# Patient Record
Sex: FEMALE | Race: WHITE | NOT HISPANIC OR LATINO | Employment: OTHER | URBAN - NONMETROPOLITAN AREA
[De-identification: names, ages, dates, MRNs, and addresses within clinical notes are randomized per-mention and may not be internally consistent; named-entity substitution may affect disease eponyms.]

---

## 2017-01-09 ENCOUNTER — HOSPITAL ENCOUNTER (OUTPATIENT)
Dept: LAB | Facility: HOSPITAL | Age: 72
Discharge: HOME | End: 2017-01-09
Attending: FAMILY MEDICINE

## 2017-01-09 LAB
INR PPP: 2.8 (ref 0.9–1.1)
PROTHROMBIN TIME: 30.7 SEC (ref 9.3–12.1)

## 2017-02-15 ENCOUNTER — TRANSCRIBE ORDERS (OUTPATIENT)
Dept: ULTRASOUND IMAGING | Facility: HOSPITAL | Age: 72
End: 2017-02-15

## 2017-02-15 DIAGNOSIS — N18.30 CHRONIC KIDNEY DISEASE, STAGE III (MODERATE) (HCC): Primary | ICD-10-CM

## 2017-02-15 DIAGNOSIS — I10 ESSENTIAL HYPERTENSION, MALIGNANT: ICD-10-CM

## 2017-03-07 ENCOUNTER — HOSPITAL ENCOUNTER (OUTPATIENT)
Dept: ULTRASOUND IMAGING | Facility: HOSPITAL | Age: 72
Discharge: HOME | End: 2017-03-07

## 2017-03-07 ENCOUNTER — HOSPITAL ENCOUNTER (OUTPATIENT)
Dept: ULTRASOUND IMAGING | Facility: HOSPITAL | Age: 72
Discharge: HOME | End: 2017-03-07
Admitting: INTERNAL MEDICINE

## 2017-03-07 DIAGNOSIS — I10 ESSENTIAL HYPERTENSION, MALIGNANT: ICD-10-CM

## 2017-03-07 DIAGNOSIS — N18.30 CHRONIC KIDNEY DISEASE, STAGE III (MODERATE) (HCC): ICD-10-CM

## 2017-03-07 PROCEDURE — 76775 US EXAM ABDO BACK WALL LIM: CPT

## 2017-03-07 PROCEDURE — 93975 VASCULAR STUDY: CPT

## 2017-03-13 ENCOUNTER — TRANSCRIBE ORDERS (OUTPATIENT)
Dept: LAB | Facility: HOSPITAL | Age: 72
End: 2017-03-13

## 2017-03-13 ENCOUNTER — LAB (OUTPATIENT)
Dept: LAB | Facility: HOSPITAL | Age: 72
End: 2017-03-13

## 2017-03-13 DIAGNOSIS — E11.9 DIABETES MELLITUS WITHOUT COMPLICATION (HCC): ICD-10-CM

## 2017-03-13 DIAGNOSIS — E78.5 HYPERLIPIDEMIA, UNSPECIFIED HYPERLIPIDEMIA TYPE: ICD-10-CM

## 2017-03-13 DIAGNOSIS — D50.9 IRON DEFICIENCY ANEMIA, UNSPECIFIED: ICD-10-CM

## 2017-03-13 DIAGNOSIS — I48.91 ATRIAL FIBRILLATION, UNSPECIFIED TYPE (HCC): ICD-10-CM

## 2017-03-13 DIAGNOSIS — I48.91 ATRIAL FIBRILLATION, UNSPECIFIED TYPE (HCC): Primary | ICD-10-CM

## 2017-03-13 LAB
ALBUMIN SERPL-MCNC: 4.2 G/DL (ref 3.5–5)
ALBUMIN/GLOB SERPL: 1.5 G/DL (ref 1–2)
ALP SERPL-CCNC: 64 U/L (ref 38–126)
ALT SERPL W P-5'-P-CCNC: 19 U/L (ref 13–69)
ANION GAP SERPL CALCULATED.3IONS-SCNC: 12.3 MMOL/L
AST SERPL-CCNC: 21 U/L (ref 15–46)
BASOPHILS # BLD AUTO: 0.03 10*3/MM3 (ref 0–0.2)
BASOPHILS NFR BLD AUTO: 0.6 % (ref 0–2.5)
BILIRUB SERPL-MCNC: 0.8 MG/DL (ref 0.2–1.3)
BUN BLD-MCNC: 22 MG/DL (ref 7–20)
BUN/CREAT SERPL: 15.7 (ref 7.1–23.5)
CALCIUM SPEC-SCNC: 10.2 MG/DL (ref 8.4–10.2)
CHLORIDE SERPL-SCNC: 102 MMOL/L (ref 98–107)
CHOLEST SERPL-MCNC: 126 MG/DL (ref 0–199)
CO2 SERPL-SCNC: 29 MMOL/L (ref 26–30)
CREAT BLD-MCNC: 1.4 MG/DL (ref 0.6–1.3)
DEPRECATED RDW RBC AUTO: 48.9 FL (ref 37–54)
EOSINOPHIL # BLD AUTO: 0.05 10*3/MM3 (ref 0–0.7)
EOSINOPHIL NFR BLD AUTO: 1 % (ref 0–7)
ERYTHROCYTE [DISTWIDTH] IN BLOOD BY AUTOMATED COUNT: 14.5 % (ref 11.5–14.5)
GFR SERPL CREATININE-BSD FRML MDRD: 37 ML/MIN/1.73
GLOBULIN UR ELPH-MCNC: 2.8 GM/DL
GLUCOSE BLD-MCNC: 112 MG/DL (ref 74–98)
HBA1C MFR BLD: 6.4 % (ref 3–6)
HCT VFR BLD AUTO: 31.5 % (ref 37–47)
HDLC SERPL-MCNC: 69 MG/DL (ref 40–60)
HGB BLD-MCNC: 10.2 G/DL (ref 12–16)
IMM GRANULOCYTES # BLD: 0.03 10*3/MM3 (ref 0–0.06)
IMM GRANULOCYTES NFR BLD: 0.6 % (ref 0–0.6)
INR PPP: 3.11 (ref 0.9–1.1)
LDLC SERPL CALC-MCNC: 47 MG/DL (ref 0–99)
LDLC/HDLC SERPL: 0.68 {RATIO}
LYMPHOCYTES # BLD AUTO: 0.98 10*3/MM3 (ref 0.6–3.4)
LYMPHOCYTES NFR BLD AUTO: 19.7 % (ref 10–50)
MCH RBC QN AUTO: 30.1 PG (ref 27–31)
MCHC RBC AUTO-ENTMCNC: 32.4 G/DL (ref 30–37)
MCV RBC AUTO: 92.9 FL (ref 81–99)
MONOCYTES # BLD AUTO: 0.36 10*3/MM3 (ref 0–0.9)
MONOCYTES NFR BLD AUTO: 7.2 % (ref 0–12)
NEUTROPHILS # BLD AUTO: 3.53 10*3/MM3 (ref 2–6.9)
NEUTROPHILS NFR BLD AUTO: 70.9 % (ref 37–80)
NRBC BLD MANUAL-RTO: 0 /100 WBC (ref 0–0)
PLATELET # BLD AUTO: 198 10*3/MM3 (ref 130–400)
PMV BLD AUTO: 10.4 FL (ref 6–12)
POTASSIUM BLD-SCNC: 4.3 MMOL/L (ref 3.5–5.1)
PROT SERPL-MCNC: 7 G/DL (ref 6.3–8.2)
PROTHROMBIN TIME: 34.1 SECONDS (ref 9.3–12.1)
RBC # BLD AUTO: 3.39 10*6/MM3 (ref 4.2–5.4)
SODIUM BLD-SCNC: 139 MMOL/L (ref 137–145)
TRIGL SERPL-MCNC: 49 MG/DL
VLDLC SERPL-MCNC: 9.8 MG/DL
WBC NRBC COR # BLD: 4.98 10*3/MM3 (ref 4.8–10.8)

## 2017-03-13 PROCEDURE — 80053 COMPREHEN METABOLIC PANEL: CPT | Performed by: FAMILY MEDICINE

## 2017-03-13 PROCEDURE — 85025 COMPLETE CBC W/AUTO DIFF WBC: CPT | Performed by: FAMILY MEDICINE

## 2017-03-13 PROCEDURE — 85610 PROTHROMBIN TIME: CPT | Performed by: FAMILY MEDICINE

## 2017-03-13 PROCEDURE — 36415 COLL VENOUS BLD VENIPUNCTURE: CPT

## 2017-03-13 PROCEDURE — 80061 LIPID PANEL: CPT | Performed by: FAMILY MEDICINE

## 2017-03-13 PROCEDURE — 83036 HEMOGLOBIN GLYCOSYLATED A1C: CPT | Performed by: FAMILY MEDICINE

## 2017-03-14 PROBLEM — C50.919 BREAST CANCER (HCC): Status: ACTIVE | Noted: 2017-03-14

## 2017-03-14 PROBLEM — R91.8 PULMONARY NODULES: Status: ACTIVE | Noted: 2017-03-14

## 2017-03-14 PROBLEM — I48.91 ATRIAL FIBRILLATION (HCC): Status: ACTIVE | Noted: 2017-03-14

## 2017-04-21 ENCOUNTER — LAB (OUTPATIENT)
Dept: LAB | Facility: HOSPITAL | Age: 72
End: 2017-04-21

## 2017-04-21 ENCOUNTER — TRANSCRIBE ORDERS (OUTPATIENT)
Dept: ADMINISTRATIVE | Facility: HOSPITAL | Age: 72
End: 2017-04-21

## 2017-04-21 DIAGNOSIS — I48.91 ATRIAL FIBRILLATION, UNSPECIFIED TYPE (HCC): Primary | ICD-10-CM

## 2017-04-21 DIAGNOSIS — I48.91 ATRIAL FIBRILLATION, UNSPECIFIED TYPE (HCC): ICD-10-CM

## 2017-04-21 LAB
INR PPP: 2.07 (ref 0.9–1.1)
PROTHROMBIN TIME: 22.7 SECONDS (ref 9.3–12.1)

## 2017-04-21 PROCEDURE — 85610 PROTHROMBIN TIME: CPT

## 2017-04-21 PROCEDURE — 36415 COLL VENOUS BLD VENIPUNCTURE: CPT

## 2017-05-03 ENCOUNTER — LAB (OUTPATIENT)
Dept: LAB | Facility: HOSPITAL | Age: 72
End: 2017-05-03

## 2017-05-03 DIAGNOSIS — I48.91 ATRIAL FIBRILLATION, UNSPECIFIED TYPE (HCC): ICD-10-CM

## 2017-05-03 LAB
INR PPP: 2.97 (ref 0.9–1.1)
PROTHROMBIN TIME: 32.6 SECONDS (ref 9.3–12.1)

## 2017-05-03 PROCEDURE — 85610 PROTHROMBIN TIME: CPT | Performed by: FAMILY MEDICINE

## 2017-05-03 PROCEDURE — 36415 COLL VENOUS BLD VENIPUNCTURE: CPT

## 2017-05-25 ENCOUNTER — OFFICE VISIT (OUTPATIENT)
Dept: ONCOLOGY | Facility: CLINIC | Age: 72
End: 2017-05-25

## 2017-05-25 VITALS
BODY MASS INDEX: 34.72 KG/M2 | RESPIRATION RATE: 18 BRPM | HEART RATE: 66 BPM | WEIGHT: 196 LBS | DIASTOLIC BLOOD PRESSURE: 69 MMHG | SYSTOLIC BLOOD PRESSURE: 176 MMHG | TEMPERATURE: 97 F

## 2017-05-25 DIAGNOSIS — C50.919 MALIGNANT NEOPLASM OF FEMALE BREAST, UNSPECIFIED LATERALITY, UNSPECIFIED SITE OF BREAST: Primary | ICD-10-CM

## 2017-05-25 PROCEDURE — 99213 OFFICE O/P EST LOW 20 MIN: CPT | Performed by: INTERNAL MEDICINE

## 2017-05-25 RX ORDER — GABAPENTIN 600 MG/1
600 TABLET ORAL 2 TIMES DAILY
Status: ON HOLD | COMMUNITY
Start: 2017-04-05 | End: 2020-01-01 | Stop reason: SDUPTHER

## 2017-05-25 RX ORDER — SPIRONOLACTONE 25 MG/1
25 TABLET ORAL DAILY
Status: ON HOLD | COMMUNITY
Start: 2017-04-05 | End: 2019-05-27 | Stop reason: SDUPTHER

## 2017-05-25 RX ORDER — ATORVASTATIN CALCIUM 40 MG/1
40 TABLET, FILM COATED ORAL NIGHTLY
COMMUNITY
Start: 2017-04-02 | End: 2021-01-01 | Stop reason: HOSPADM

## 2017-05-25 RX ORDER — WARFARIN SODIUM 4 MG/1
TABLET ORAL
Status: ON HOLD | COMMUNITY
Start: 2017-05-23 | End: 2019-04-07

## 2017-06-07 ENCOUNTER — HOSPITAL ENCOUNTER (OUTPATIENT)
Dept: MRI IMAGING | Facility: HOSPITAL | Age: 72
Discharge: HOME | End: 2017-06-07
Admitting: INTERNAL MEDICINE

## 2017-06-07 DIAGNOSIS — C50.919 MALIGNANT NEOPLASM OF FEMALE BREAST, UNSPECIFIED LATERALITY, UNSPECIFIED SITE OF BREAST: ICD-10-CM

## 2017-06-07 PROCEDURE — 72195 MRI PELVIS W/O DYE: CPT

## 2017-06-08 ENCOUNTER — TELEPHONE (OUTPATIENT)
Dept: ONCOLOGY | Facility: CLINIC | Age: 72
End: 2017-06-08

## 2017-06-08 ENCOUNTER — TRANSCRIBE ORDERS (OUTPATIENT)
Dept: MRI IMAGING | Facility: HOSPITAL | Age: 72
End: 2017-06-08

## 2017-06-08 DIAGNOSIS — M54.30 SCIATICA, UNSPECIFIED LATERALITY: Primary | ICD-10-CM

## 2017-06-08 NOTE — TELEPHONE ENCOUNTER
I spoke with the patient and informed her of the impression of her MRI of pelvis.  No evidence of a fracture or metastatic disease within the pelvis.  Patient will follow up with PCP.

## 2017-06-09 ENCOUNTER — DOCUMENTATION (OUTPATIENT)
Dept: SOCIAL WORK | Facility: HOSPITAL | Age: 72
End: 2017-06-09

## 2017-06-09 NOTE — PROGRESS NOTES
Patient called my office to enquire if she had an MRI scheduled for today. After looking at scheduled appts, patient informed that MRI is scheduled for 06/16/2017.  Pt proceeded to discuss the severe pain she is having across lower buttocks radiating down Rt leg.  States she will schedule an appt with specialist at  once MRI is completed.  States pain is debilitating and interfering with daily activities. States currently is taking Gabapentin and Tylenol for pain and using heating pad.  Will notify MRI staff of patient's call and informed patient they will contact her if needed for further instructions.

## 2017-06-16 ENCOUNTER — HOSPITAL ENCOUNTER (OUTPATIENT)
Dept: MRI IMAGING | Facility: HOSPITAL | Age: 72
Discharge: HOME | End: 2017-06-16
Admitting: FAMILY MEDICINE

## 2017-06-16 DIAGNOSIS — M54.30 SCIATICA, UNSPECIFIED LATERALITY: ICD-10-CM

## 2017-06-16 PROCEDURE — 72148 MRI LUMBAR SPINE W/O DYE: CPT

## 2017-07-11 ENCOUNTER — LAB (OUTPATIENT)
Dept: LAB | Facility: HOSPITAL | Age: 72
End: 2017-07-11

## 2017-07-11 DIAGNOSIS — I48.91 ATRIAL FIBRILLATION, UNSPECIFIED TYPE (HCC): ICD-10-CM

## 2017-07-11 LAB
INR PPP: 2.71 (ref 0.9–1.1)
PROTHROMBIN TIME: 29.7 SECONDS (ref 9.3–12.1)

## 2017-07-11 PROCEDURE — 36415 COLL VENOUS BLD VENIPUNCTURE: CPT

## 2017-07-11 PROCEDURE — 85610 PROTHROMBIN TIME: CPT | Performed by: FAMILY MEDICINE

## 2017-07-25 ENCOUNTER — OFFICE VISIT (OUTPATIENT)
Dept: SURGERY | Facility: CLINIC | Age: 72
End: 2017-07-25

## 2017-07-25 VITALS
WEIGHT: 197.2 LBS | HEIGHT: 63 IN | OXYGEN SATURATION: 97 % | RESPIRATION RATE: 16 BRPM | HEART RATE: 59 BPM | BODY MASS INDEX: 34.94 KG/M2 | TEMPERATURE: 97 F

## 2017-07-25 DIAGNOSIS — Z79.01 LONG TERM (CURRENT) USE OF ANTICOAGULANTS: ICD-10-CM

## 2017-07-25 DIAGNOSIS — R19.5 OCCULT BLOOD POSITIVE STOOL: Primary | ICD-10-CM

## 2017-07-25 PROCEDURE — 99204 OFFICE O/P NEW MOD 45 MIN: CPT | Performed by: SURGERY

## 2017-07-25 RX ORDER — POLYETHYLENE GLYCOL 3350 17 G/17G
238 POWDER, FOR SOLUTION ORAL ONCE
Qty: 238 G | Refills: 0 | Status: SHIPPED | OUTPATIENT
Start: 2017-07-25 | End: 2017-07-25

## 2017-07-25 RX ORDER — BISACODYL 5 MG/1
TABLET, DELAYED RELEASE ORAL
Qty: 4 TABLET | Refills: 0 | Status: SHIPPED | OUTPATIENT
Start: 2017-07-25 | End: 2017-08-17 | Stop reason: HOSPADM

## 2017-07-25 RX ORDER — HYDROCHLOROTHIAZIDE 25 MG/1
25 TABLET ORAL DAILY
COMMUNITY
Start: 2017-06-04 | End: 2018-05-31 | Stop reason: HOSPADM

## 2017-07-25 RX ORDER — SODIUM CHLORIDE 9 MG/ML
50 INJECTION, SOLUTION INTRAVENOUS CONTINUOUS
Status: CANCELLED | OUTPATIENT
Start: 2017-07-25

## 2017-07-25 RX ORDER — ASPIRIN 81 MG/1
81 TABLET ORAL DAILY
COMMUNITY
End: 2019-07-01 | Stop reason: HOSPADM

## 2017-07-25 RX ORDER — ACETAMINOPHEN 325 MG/1
650 TABLET ORAL EVERY 6 HOURS PRN
COMMUNITY
End: 2021-01-01

## 2017-07-25 RX ORDER — FERROUS SULFATE 325(65) MG
TABLET ORAL
Refills: 5 | Status: ON HOLD | COMMUNITY
Start: 2017-05-08 | End: 2019-04-07

## 2017-07-25 RX ORDER — LOSARTAN POTASSIUM 25 MG/1
TABLET ORAL
COMMUNITY
Start: 2017-07-24 | End: 2017-07-25

## 2017-07-25 RX ORDER — LOSARTAN POTASSIUM 50 MG/1
50 TABLET ORAL DAILY
COMMUNITY
Start: 2017-06-28 | End: 2019-07-01 | Stop reason: HOSPADM

## 2017-07-25 RX ORDER — SODIUM CHLORIDE 0.9 % (FLUSH) 0.9 %
1-10 SYRINGE (ML) INJECTION AS NEEDED
Status: CANCELLED | OUTPATIENT
Start: 2017-07-25

## 2017-07-25 NOTE — PROGRESS NOTES
Subjective   Tammy Pascual is a 71 y.o. female.   Chief Complaint   Patient presents with   • Rectal Bleeding       History of Present Illness   Ms. Pascual is a 70 yo female who presents for evaluation for recent fecal occult blood testing which was positive.  She has never had a prior colon cancer screening.  She recently completed a FIT test through the [facility] and this was positive. There is no family history of colon neoplasia. No family hx of gastric, ovarian, or uterine cancer.  There is a family history of breast cancer.  Patient denies changes in bowel habits, diarrhea, constipation, abdominal pain, decreased caliber of stool, melena and brbpr. Patient reports weight gain related to depression.  There is no personal or family history of bleeding disorder or untoward reaction to anesthesia. Patient has never had a colonoscopy.     She is on long-term chronic anticoagulation for history of paroxysmal atrial fibrillation.  The patient's most recent INR was 3.1.  She reports that she has had no difficulties or side effects from stopping her Coumadin prior to other surgical procedures.  Most recently, this was stopped for 7 days in anticipation of back surgery with no need for bridging of her anticoagulation.  Her chads 2 score is 2 (HTN + CHF) giving a 4% risk per year of a thrombotic embolic event without Coumadin.  This would mean that it is reasonable to hold her Coumadin in anticipation of a procedure with low risk of a periprocedural event.    Past Medical History:   Diagnosis Date   • Anemia    • Arthritis    • Cancer     breast   • Congestive heart failure    • Diabetes mellitus    • Disease of thyroid gland    • Hearing loss    • Heart disease    • Hypertension        Past Surgical History:   Procedure Laterality Date   • BACK SURGERY  2015   • BREAST BIOPSY  1998   • BREAST EXCISIONAL BIOPSY Left 02/08/1999   • BREAST RECONSTRUCTION Bilateral    • CAROTID ARTERY ANGIOPLASTY Right 2008     ([facility])   • CATARACT EXTRACTION Bilateral    • HYSTERECTOMY     • MASTECTOMY Bilateral 2010           Current Outpatient Prescriptions:   •  acetaminophen (TYLENOL) 325 MG tablet, Take 650 mg by mouth Every 6 (Six) Hours As Needed for Mild Pain (1-3)., Disp: , Rfl:   •  aspirin 81 MG EC tablet, Take 81 mg by mouth Daily., Disp: , Rfl:   •  atorvastatin (LIPITOR) 40 MG tablet, , Disp: , Rfl:   •  carvedilol (COREG) 25 MG tablet, Take 25 mg by mouth 2 (two) times a day., Disp: , Rfl:   •  Cholecalciferol (VITAMIN D3) 5000 UNITS capsule capsule, Take 5,000 Units by mouth Daily., Disp: , Rfl:   •  ferrous sulfate 325 (65 FE) MG tablet, , Disp: , Rfl: 5  •  gabapentin (NEURONTIN) 600 MG tablet, , Disp: , Rfl:   •  hydrochlorothiazide (HYDRODIURIL) 25 MG tablet, , Disp: , Rfl:   •  losartan (COZAAR) 50 MG tablet, , Disp: , Rfl:   •  spironolactone (ALDACTONE) 25 MG tablet, , Disp: , Rfl:   •  warfarin (COUMADIN) 4 MG tablet, , Disp: , Rfl:   •  bisacodyl (DULCOLAX) 5 MG EC tablet, Take 4 tablets once by mouth at 1 pm on day before colonoscopy., Disp: 4 tablet, Rfl: 0  •  polyethylene glycol (MIRALAX) powder, Take 238 g by mouth 1 (One) Time for 1 dose. Mix 238 grams of miralax with 64 oz garotade and drink at 4 pm day before colonoscopy, Disp: 238 g, Rfl: 0  •  warfarin (COUMADIN) 1 MG tablet, Take 1 mg by mouth every 5 (five) minutes as needed., Disp: , Rfl:       Allergies   Allergen Reactions   • Quinolones    • Cephalosporins    • Ciprofloxacin    • Keflex [Cephalexin]        Family History   Problem Relation Age of Onset   • Cancer Mother      breast   • Heart disease Father    • Stroke Father    • Breast cancer Maternal Grandmother    • Breast cancer Paternal Grandmother          Social History     Social History   • Marital status: Single     Spouse name: N/A   • Number of children: N/A   • Years of education: N/A     Occupational History   • Not on file.     Social History Main Topics   • Smoking status:  "Former Smoker     Quit date: 6/20/2003   • Smokeless tobacco: Never Used   • Alcohol use Yes   • Drug use: No   • Sexual activity: Not on file     Other Topics Concern   • Not on file     Social History Narrative             Review of Systems   Constitutional: Negative for chills, fever and unexpected weight change.   HENT: Negative for hearing loss, trouble swallowing and voice change.    Eyes: Negative for visual disturbance.   Respiratory: Negative for apnea, cough, chest tightness, shortness of breath and wheezing.    Cardiovascular: Negative for chest pain, palpitations and leg swelling.   Gastrointestinal: Negative for abdominal distention, abdominal pain, anal bleeding, blood in stool, constipation, diarrhea, nausea, rectal pain and vomiting.        Positive FIT test   Endocrine: Negative for cold intolerance and heat intolerance.   Genitourinary: Negative for difficulty urinating, dysuria and flank pain.   Musculoskeletal: Negative for back pain and gait problem.   Skin: Negative for color change, rash and wound.   Neurological: Negative for dizziness, syncope, speech difficulty, weakness, light-headedness, numbness and headaches.   Hematological: Negative for adenopathy. Does not bruise/bleed easily.   Psychiatric/Behavioral: Negative for confusion. The patient is not nervous/anxious.        Objective    Pulse 59  Temp 97 °F (36.1 °C) (Temporal Artery )   Resp 16  Ht 63\" (160 cm)  Wt 197 lb 3.2 oz (89.4 kg)  SpO2 97%  BMI 34.93 kg/m2    Physical Exam   Constitutional: She is oriented to person, place, and time. She appears well-developed and well-nourished.   HENT:   Head: Normocephalic and atraumatic.   Eyes: No scleral icterus.   Neck: Neck supple.   Cardiovascular: Normal rate and regular rhythm.    Pulmonary/Chest: Effort normal.   Abdominal: Soft. She exhibits no distension. There is no tenderness.   Well healed lower midline incision noted   Neurological: She is alert and oriented to person, " place, and time.   Skin: Skin is warm and dry.   Psychiatric: She has a normal mood and affect. Her behavior is normal.       Assessment/Plan   Tammy was seen today for rectal bleeding.    Diagnoses and all orders for this visit:    Occult blood positive stool  -     Case Request; Standing  -     sodium chloride 0.9 % flush 1-10 mL; Infuse 1-10 mL into a venous catheter As Needed for Line Care.  -     sodium chloride 0.9 % infusion; Infuse 50 mL/hr into a venous catheter Continuous.  -     Case Request    Long term (current) use of anticoagulants    Other orders  -     Follow Anesthesia Guidelines / Standing Orders; Future  -     Provide NPO Instructions to Patient  -     Follow Anesthesia Guidelines / Standing Orders; Standing  -     Verify NPO Status; Standing  -     Notify Physician (Specify Parameters); Standing  -     Insert Peripheral IV; Standing  -     Saline Lock & Maintain IV Access; Standing  -     polyethylene glycol (MIRALAX) powder; Take 238 g by mouth 1 (One) Time for 1 dose. Mix 238 grams of miralax with 64 oz garotade and drink at 4 pm day before colonoscopy  -     bisacodyl (DULCOLAX) 5 MG EC tablet; Take 4 tablets once by mouth at 1 pm on day before colonoscopy.      We discussed colonoscopy for colon cancer screening and diagnostic purposes.  We discussed the indications for colonoscopy as well as the risks, benefits and alternatives to this procedure. Risks including but not limited to perforation, bleeding,need for blood transfusion or emergent surgery ,and missed neoplasm were reviewed in detail with the patient. The necessary bowel preparation and pre-procedure clear liquid diet was explained in detail.  A written instructional handout was also provided.  Electronic prescriptions for miralax and dulcolax were sent to the patient's pharmacy.  The patient was given an opportunity to ask questions.  The patient verbalized understanding of these recommendations and the plan of care. The patient  is willing to proceed with colonoscopy and has signed informed consent in the office today.  My office will arrange scheduling for the colonoscopy procedure and pre-admission testing.  The patient was advised to stop her coumadin 5 days prior to the procedure.       The patient's HANNAH report was obtained, reviewed by me and scanned into the medical record.

## 2017-08-01 PROBLEM — R19.5 OCCULT BLOOD POSITIVE STOOL: Status: ACTIVE | Noted: 2017-08-01

## 2017-08-08 ENCOUNTER — LAB (OUTPATIENT)
Dept: LAB | Facility: HOSPITAL | Age: 72
End: 2017-08-08

## 2017-08-08 DIAGNOSIS — I48.91 ATRIAL FIBRILLATION, UNSPECIFIED TYPE (HCC): ICD-10-CM

## 2017-08-08 LAB
INR PPP: 3.46 (ref 0.9–1.1)
PROTHROMBIN TIME: 37.9 SECONDS (ref 9.3–12.1)

## 2017-08-08 PROCEDURE — 85610 PROTHROMBIN TIME: CPT | Performed by: FAMILY MEDICINE

## 2017-08-08 PROCEDURE — 36415 COLL VENOUS BLD VENIPUNCTURE: CPT

## 2017-08-17 ENCOUNTER — ANESTHESIA (OUTPATIENT)
Dept: GASTROENTEROLOGY | Facility: HOSPITAL | Age: 72
End: 2017-08-17

## 2017-08-17 ENCOUNTER — ANESTHESIA EVENT (OUTPATIENT)
Dept: GASTROENTEROLOGY | Facility: HOSPITAL | Age: 72
End: 2017-08-17

## 2017-08-17 ENCOUNTER — HOSPITAL ENCOUNTER (OUTPATIENT)
Facility: HOSPITAL | Age: 72
Setting detail: HOSPITAL OUTPATIENT SURGERY
Discharge: HOME | End: 2017-08-17
Attending: SURGERY | Admitting: SURGERY

## 2017-08-17 VITALS
SYSTOLIC BLOOD PRESSURE: 160 MMHG | HEART RATE: 60 BPM | RESPIRATION RATE: 16 BRPM | WEIGHT: 195 LBS | TEMPERATURE: 97.8 F | HEIGHT: 63 IN | BODY MASS INDEX: 34.55 KG/M2 | OXYGEN SATURATION: 98 % | DIASTOLIC BLOOD PRESSURE: 70 MMHG

## 2017-08-17 DIAGNOSIS — R19.5 OCCULT BLOOD POSITIVE STOOL: ICD-10-CM

## 2017-08-17 PROCEDURE — 25010000002 PROPOFOL 200 MG/20ML EMULSION: Performed by: NURSE ANESTHETIST, CERTIFIED REGISTERED

## 2017-08-17 PROCEDURE — 45378 DIAGNOSTIC COLONOSCOPY: CPT | Performed by: SURGERY

## 2017-08-17 RX ORDER — SODIUM CHLORIDE 9 MG/ML
50 INJECTION, SOLUTION INTRAVENOUS CONTINUOUS
Status: DISCONTINUED | OUTPATIENT
Start: 2017-08-17 | End: 2017-08-17 | Stop reason: HOSPADM

## 2017-08-17 RX ORDER — SODIUM CHLORIDE 0.9 % (FLUSH) 0.9 %
1-10 SYRINGE (ML) INJECTION AS NEEDED
Status: DISCONTINUED | OUTPATIENT
Start: 2017-08-17 | End: 2017-08-17 | Stop reason: HOSPADM

## 2017-08-17 RX ORDER — PROPOFOL 10 MG/ML
INJECTION, EMULSION INTRAVENOUS AS NEEDED
Status: DISCONTINUED | OUTPATIENT
Start: 2017-08-17 | End: 2017-08-17 | Stop reason: SURG

## 2017-08-17 RX ADMIN — PROPOFOL 50 MG: 10 INJECTION, EMULSION INTRAVENOUS at 13:16

## 2017-08-17 RX ADMIN — PROPOFOL 50 MG: 10 INJECTION, EMULSION INTRAVENOUS at 13:09

## 2017-08-17 RX ADMIN — PROPOFOL 50 MG: 10 INJECTION, EMULSION INTRAVENOUS at 12:51

## 2017-08-17 RX ADMIN — SODIUM CHLORIDE 50 ML/HR: 9 INJECTION, SOLUTION INTRAVENOUS at 11:56

## 2017-08-17 RX ADMIN — PROPOFOL 50 MG: 10 INJECTION, EMULSION INTRAVENOUS at 13:00

## 2017-08-17 RX ADMIN — PROPOFOL 100 MG: 10 INJECTION, EMULSION INTRAVENOUS at 12:40

## 2017-08-17 RX ADMIN — PROPOFOL 50 MG: 10 INJECTION, EMULSION INTRAVENOUS at 12:46

## 2017-08-17 RX ADMIN — PROPOFOL 50 MG: 10 INJECTION, EMULSION INTRAVENOUS at 13:04

## 2017-08-17 RX ADMIN — LIDOCAINE HYDROCHLORIDE 60 MG: 20 INJECTION, SOLUTION INTRAVENOUS at 12:40

## 2017-08-17 RX ADMIN — PROPOFOL 50 MG: 10 INJECTION, EMULSION INTRAVENOUS at 12:56

## 2017-08-17 NOTE — ANESTHESIA POSTPROCEDURE EVALUATION
Patient: Tammy Pascual    Procedure Summary     Date Anesthesia Start Anesthesia Stop Room / Location    08/17/17 1238 1328 [facility] ENDOSCOPY 3 / [facility] ENDOSCOPY       Procedure Diagnosis Surgeon Provider    COLONOSCOPY (N/A ) Occult blood positive stool  (Occult blood positive stool [R19.5]) MD Leticia Zuleta CRNA          Anesthesia Type: MAC  Last vitals  BP   165/42 (08/17/17 1333)    Temp   97.8 °F (36.6 °C) (08/17/17 1333)    Pulse   58 (08/17/17 1333)   Resp   18 (08/17/17 1333)    SpO2   98 % (08/17/17 1333)      Post Anesthesia Care and Evaluation    Patient location during evaluation: PHASE II  Patient participation: complete - patient participated  Level of consciousness: awake and alert  Pain score: 0  Pain management: satisfactory to patient  Airway patency: patent  Anesthetic complications: No anesthetic complications  PONV Status: none  Cardiovascular status: acceptable and stable  Respiratory status: acceptable  Hydration status: acceptable

## 2017-08-17 NOTE — H&P (VIEW-ONLY)
Subjective   Tammy Pascual is a 71 y.o. female.   Chief Complaint   Patient presents with   • Rectal Bleeding       History of Present Illness   Ms. Pascual is a 70 yo female who presents for evaluation for recent fecal occult blood testing which was positive.  She has never had a prior colon cancer screening.  She recently completed a FIT test through the [facility] and this was positive. There is no family history of colon neoplasia. No family hx of gastric, ovarian, or uterine cancer.  There is a family history of breast cancer.  Patient denies changes in bowel habits, diarrhea, constipation, abdominal pain, decreased caliber of stool, melena and brbpr. Patient reports weight gain related to depression.  There is no personal or family history of bleeding disorder or untoward reaction to anesthesia. Patient has never had a colonoscopy.     She is on long-term chronic anticoagulation for history of paroxysmal atrial fibrillation.  The patient's most recent INR was 3.1.  She reports that she has had no difficulties or side effects from stopping her Coumadin prior to other surgical procedures.  Most recently, this was stopped for 7 days in anticipation of back surgery with no need for bridging of her anticoagulation.  Her chads 2 score is 2 (HTN + CHF) giving a 4% risk per year of a thrombotic embolic event without Coumadin.  This would mean that it is reasonable to hold her Coumadin in anticipation of a procedure with low risk of a periprocedural event.    Past Medical History:   Diagnosis Date   • Anemia    • Arthritis    • Cancer     breast   • Congestive heart failure    • Diabetes mellitus    • Disease of thyroid gland    • Hearing loss    • Heart disease    • Hypertension        Past Surgical History:   Procedure Laterality Date   • BACK SURGERY  2015   • BREAST BIOPSY  1998   • BREAST EXCISIONAL BIOPSY Left 02/08/1999   • BREAST RECONSTRUCTION Bilateral    • CAROTID ARTERY ANGIOPLASTY Right 2008     ([facility])   • CATARACT EXTRACTION Bilateral    • HYSTERECTOMY     • MASTECTOMY Bilateral 2010           Current Outpatient Prescriptions:   •  acetaminophen (TYLENOL) 325 MG tablet, Take 650 mg by mouth Every 6 (Six) Hours As Needed for Mild Pain (1-3)., Disp: , Rfl:   •  aspirin 81 MG EC tablet, Take 81 mg by mouth Daily., Disp: , Rfl:   •  atorvastatin (LIPITOR) 40 MG tablet, , Disp: , Rfl:   •  carvedilol (COREG) 25 MG tablet, Take 25 mg by mouth 2 (two) times a day., Disp: , Rfl:   •  Cholecalciferol (VITAMIN D3) 5000 UNITS capsule capsule, Take 5,000 Units by mouth Daily., Disp: , Rfl:   •  ferrous sulfate 325 (65 FE) MG tablet, , Disp: , Rfl: 5  •  gabapentin (NEURONTIN) 600 MG tablet, , Disp: , Rfl:   •  hydrochlorothiazide (HYDRODIURIL) 25 MG tablet, , Disp: , Rfl:   •  losartan (COZAAR) 50 MG tablet, , Disp: , Rfl:   •  spironolactone (ALDACTONE) 25 MG tablet, , Disp: , Rfl:   •  warfarin (COUMADIN) 4 MG tablet, , Disp: , Rfl:   •  bisacodyl (DULCOLAX) 5 MG EC tablet, Take 4 tablets once by mouth at 1 pm on day before colonoscopy., Disp: 4 tablet, Rfl: 0  •  polyethylene glycol (MIRALAX) powder, Take 238 g by mouth 1 (One) Time for 1 dose. Mix 238 grams of miralax with 64 oz garotade and drink at 4 pm day before colonoscopy, Disp: 238 g, Rfl: 0  •  warfarin (COUMADIN) 1 MG tablet, Take 1 mg by mouth every 5 (five) minutes as needed., Disp: , Rfl:       Allergies   Allergen Reactions   • Quinolones    • Cephalosporins    • Ciprofloxacin    • Keflex [Cephalexin]        Family History   Problem Relation Age of Onset   • Cancer Mother      breast   • Heart disease Father    • Stroke Father    • Breast cancer Maternal Grandmother    • Breast cancer Paternal Grandmother          Social History     Social History   • Marital status: Single     Spouse name: N/A   • Number of children: N/A   • Years of education: N/A     Occupational History   • Not on file.     Social History Main Topics   • Smoking status:  "Former Smoker     Quit date: 6/20/2003   • Smokeless tobacco: Never Used   • Alcohol use Yes   • Drug use: No   • Sexual activity: Not on file     Other Topics Concern   • Not on file     Social History Narrative             Review of Systems   Constitutional: Negative for chills, fever and unexpected weight change.   HENT: Negative for hearing loss, trouble swallowing and voice change.    Eyes: Negative for visual disturbance.   Respiratory: Negative for apnea, cough, chest tightness, shortness of breath and wheezing.    Cardiovascular: Negative for chest pain, palpitations and leg swelling.   Gastrointestinal: Negative for abdominal distention, abdominal pain, anal bleeding, blood in stool, constipation, diarrhea, nausea, rectal pain and vomiting.        Positive FIT test   Endocrine: Negative for cold intolerance and heat intolerance.   Genitourinary: Negative for difficulty urinating, dysuria and flank pain.   Musculoskeletal: Negative for back pain and gait problem.   Skin: Negative for color change, rash and wound.   Neurological: Negative for dizziness, syncope, speech difficulty, weakness, light-headedness, numbness and headaches.   Hematological: Negative for adenopathy. Does not bruise/bleed easily.   Psychiatric/Behavioral: Negative for confusion. The patient is not nervous/anxious.        Objective    Pulse 59  Temp 97 °F (36.1 °C) (Temporal Artery )   Resp 16  Ht 63\" (160 cm)  Wt 197 lb 3.2 oz (89.4 kg)  SpO2 97%  BMI 34.93 kg/m2    Physical Exam   Constitutional: She is oriented to person, place, and time. She appears well-developed and well-nourished.   HENT:   Head: Normocephalic and atraumatic.   Eyes: No scleral icterus.   Neck: Neck supple.   Cardiovascular: Normal rate and regular rhythm.    Pulmonary/Chest: Effort normal.   Abdominal: Soft. She exhibits no distension. There is no tenderness.   Well healed lower midline incision noted   Neurological: She is alert and oriented to person, " place, and time.   Skin: Skin is warm and dry.   Psychiatric: She has a normal mood and affect. Her behavior is normal.       Assessment/Plan   Tammy was seen today for rectal bleeding.    Diagnoses and all orders for this visit:    Occult blood positive stool  -     Case Request; Standing  -     sodium chloride 0.9 % flush 1-10 mL; Infuse 1-10 mL into a venous catheter As Needed for Line Care.  -     sodium chloride 0.9 % infusion; Infuse 50 mL/hr into a venous catheter Continuous.  -     Case Request    Long term (current) use of anticoagulants    Other orders  -     Follow Anesthesia Guidelines / Standing Orders; Future  -     Provide NPO Instructions to Patient  -     Follow Anesthesia Guidelines / Standing Orders; Standing  -     Verify NPO Status; Standing  -     Notify Physician (Specify Parameters); Standing  -     Insert Peripheral IV; Standing  -     Saline Lock & Maintain IV Access; Standing  -     polyethylene glycol (MIRALAX) powder; Take 238 g by mouth 1 (One) Time for 1 dose. Mix 238 grams of miralax with 64 oz garotade and drink at 4 pm day before colonoscopy  -     bisacodyl (DULCOLAX) 5 MG EC tablet; Take 4 tablets once by mouth at 1 pm on day before colonoscopy.      We discussed colonoscopy for colon cancer screening and diagnostic purposes.  We discussed the indications for colonoscopy as well as the risks, benefits and alternatives to this procedure. Risks including but not limited to perforation, bleeding,need for blood transfusion or emergent surgery ,and missed neoplasm were reviewed in detail with the patient. The necessary bowel preparation and pre-procedure clear liquid diet was explained in detail.  A written instructional handout was also provided.  Electronic prescriptions for miralax and dulcolax were sent to the patient's pharmacy.  The patient was given an opportunity to ask questions.  The patient verbalized understanding of these recommendations and the plan of care. The patient  is willing to proceed with colonoscopy and has signed informed consent in the office today.  My office will arrange scheduling for the colonoscopy procedure and pre-admission testing.  The patient was advised to stop her coumadin 5 days prior to the procedure.       The patient's HANNAH report was obtained, reviewed by me and scanned into the medical record.

## 2017-08-17 NOTE — ANESTHESIA PREPROCEDURE EVALUATION
" Anesthesia Evaluation     Patient summary reviewed and Nursing notes reviewed   no history of anesthetic complications:  NPO Solid Status: > 8 hours  NPO Liquid Status: > 8 hours     Airway   Mallampati: I  TM distance: >3 FB  Neck ROM: full  no difficulty expected  Dental    (+) upper dentures    Pulmonary - normal exam   (+) a smoker Former, sleep apnea on CPAP,   Cardiovascular   Exercise tolerance: good (4-7 METS)    Patient on routine beta blocker and Beta blocker given within 24 hours of surgery  Rhythm: irregular  Rate: normal    (+) hypertension well controlled, dysrhythmias (States \"comes and goes\") Atrial Fib, CHF,     ROS comment: 02/03/2016   CONCLUSIONS:      1. The patient had normal chamber dimensions with moderate left atrial     enlargement with mild concentric left ventricular hypertrophy with normal     global LV systolic function with calculated ejection fraction of 66% with     suggestion of mildly elevated filling pressure based on mitral valve TDI. RV     dimensions and RV function were normal.     2. The patient had mild aortic sclerosis with mild calcification involving the     mitral apparatus with normal valve excursion.     3. The pericardium was normal.      EKG NSR    Neuro/Psych- negative ROS  GI/Hepatic/Renal/Endo    (+) obesity,  diabetes mellitus (diet controlled), hypothyroidism,     Musculoskeletal     Abdominal    Substance History - negative use     OB/GYN negative ob/gyn ROS         Other   (+) arthritis   history of cancer remission                                    Anesthesia Plan    ASA 3     MAC   (Risks and benefits discussed including risk of aspiration, recall and dental damage. All patient questions answered. Will continue with POC.)  intravenous induction   Anesthetic plan and risks discussed with patient.      "

## 2017-08-17 NOTE — BRIEF OP NOTE
COLONOSCOPY  Procedure Note    Tammy Pascual  8/17/2017    Pre-op Diagnosis:   Occult blood positive stool [R19.5]    Post-op Diagnosis:     Post-Op Diagnosis Codes:     * Occult blood positive stool [R19.5]    Procedure/CPT® Codes: 32709      Procedure(s):  COLONOSCOPY    Surgeon(s):  Sindy Clement MD    Anesthesia: Monitor Anesthesia Care    Staff:   Circulator: Dorene Ware RN  Scrub Person: Latonya Bentley CSA; Emiliana Martines    Estimated Blood Loss: *No blood loss documented*  Urine Voided: * No values recorded between 8/17/2017 12:35 PM and 8/17/2017  1:24 PM *    Specimens:                * No specimens in log *          Findings: dense, pandiverticulosis L>R, tortuous left colon, internal hemorrhoids    Complications: none      Sindy Clement MD     Date: 8/17/2017  Time: 1:27 PM

## 2017-08-23 ENCOUNTER — TRANSCRIBE ORDERS (OUTPATIENT)
Dept: MRI IMAGING | Facility: HOSPITAL | Age: 72
End: 2017-08-23

## 2017-08-23 DIAGNOSIS — M48.061 LUMBAR STENOSIS: Primary | ICD-10-CM

## 2017-08-25 ENCOUNTER — HOSPITAL ENCOUNTER (OUTPATIENT)
Dept: MRI IMAGING | Facility: HOSPITAL | Age: 72
Discharge: HOME | End: 2017-08-25
Admitting: NEUROLOGICAL SURGERY

## 2017-08-25 DIAGNOSIS — M48.061 LUMBAR STENOSIS: ICD-10-CM

## 2017-08-25 LAB
CREAT BLD-MCNC: 1.7 MG/DL (ref 0.6–1.3)
GFR SERPL CREATININE-BSD FRML MDRD: 30 ML/MIN/1.73

## 2017-08-25 PROCEDURE — 72148 MRI LUMBAR SPINE W/O DYE: CPT

## 2017-08-25 PROCEDURE — 82565 ASSAY OF CREATININE: CPT | Performed by: FAMILY MEDICINE

## 2017-09-07 ENCOUNTER — LAB (OUTPATIENT)
Dept: LAB | Facility: HOSPITAL | Age: 72
End: 2017-09-07

## 2017-09-07 DIAGNOSIS — I48.91 ATRIAL FIBRILLATION, UNSPECIFIED TYPE (HCC): ICD-10-CM

## 2017-09-07 LAB
INR PPP: 2.67 (ref 0.9–1.1)
PROTHROMBIN TIME: 29.3 SECONDS (ref 9.3–12.1)

## 2017-09-07 PROCEDURE — 85610 PROTHROMBIN TIME: CPT | Performed by: FAMILY MEDICINE

## 2017-09-07 PROCEDURE — 36415 COLL VENOUS BLD VENIPUNCTURE: CPT

## 2017-09-15 ENCOUNTER — LAB (OUTPATIENT)
Dept: LAB | Facility: HOSPITAL | Age: 72
End: 2017-09-15

## 2017-09-15 ENCOUNTER — TRANSCRIBE ORDERS (OUTPATIENT)
Dept: LAB | Facility: HOSPITAL | Age: 72
End: 2017-09-15

## 2017-09-15 DIAGNOSIS — E78.00 PURE HYPERCHOLESTEROLEMIA: ICD-10-CM

## 2017-09-15 DIAGNOSIS — E78.5 HYPERLIPIDEMIA, UNSPECIFIED HYPERLIPIDEMIA TYPE: Primary | ICD-10-CM

## 2017-09-15 DIAGNOSIS — R73.03 DIABETES MELLITUS, LATENT: ICD-10-CM

## 2017-09-15 DIAGNOSIS — I25.10 UNSPECIFIED CARDIOVASCULAR DISEASE: ICD-10-CM

## 2017-09-15 DIAGNOSIS — I50.32 CHRONIC DIASTOLIC HEART FAILURE (HCC): ICD-10-CM

## 2017-09-15 DIAGNOSIS — E11.9 DIABETES MELLITUS WITHOUT COMPLICATION (HCC): ICD-10-CM

## 2017-09-15 DIAGNOSIS — I50.32 CHRONIC DIASTOLIC HEART FAILURE (HCC): Primary | ICD-10-CM

## 2017-09-15 DIAGNOSIS — E78.5 HYPERLIPIDEMIA, UNSPECIFIED HYPERLIPIDEMIA TYPE: ICD-10-CM

## 2017-09-15 LAB
ALBUMIN SERPL-MCNC: 4.4 G/DL (ref 3.5–5)
ALBUMIN/GLOB SERPL: 1.8 G/DL (ref 1–2)
ALP SERPL-CCNC: 64 U/L (ref 38–126)
ALT SERPL W P-5'-P-CCNC: 29 U/L (ref 13–69)
ANION GAP SERPL CALCULATED.3IONS-SCNC: 13.7 MMOL/L
AST SERPL-CCNC: 24 U/L (ref 15–46)
BILIRUB SERPL-MCNC: 0.8 MG/DL (ref 0.2–1.3)
BUN BLD-MCNC: 23 MG/DL (ref 7–20)
BUN/CREAT SERPL: 16.4 (ref 7.1–23.5)
CALCIUM SPEC-SCNC: 10.5 MG/DL (ref 8.4–10.2)
CHLORIDE SERPL-SCNC: 95 MMOL/L (ref 98–107)
CHOLEST SERPL-MCNC: 125 MG/DL (ref 0–199)
CO2 SERPL-SCNC: 26 MMOL/L (ref 26–30)
CREAT BLD-MCNC: 1.4 MG/DL (ref 0.6–1.3)
GFR SERPL CREATININE-BSD FRML MDRD: 37 ML/MIN/1.73
GLOBULIN UR ELPH-MCNC: 2.4 GM/DL
GLUCOSE BLD-MCNC: 101 MG/DL (ref 74–98)
HBA1C MFR BLD: 6 % (ref 3–6)
HDLC SERPL-MCNC: 69 MG/DL (ref 40–60)
LDLC SERPL CALC-MCNC: 47 MG/DL (ref 0–99)
LDLC/HDLC SERPL: 0.68 {RATIO}
NT-PROBNP SERPL-MCNC: 1190 PG/ML (ref 0–125)
POTASSIUM BLD-SCNC: 4.7 MMOL/L (ref 3.5–5.1)
PROT SERPL-MCNC: 6.8 G/DL (ref 6.3–8.2)
SODIUM BLD-SCNC: 130 MMOL/L (ref 137–145)
TRIGL SERPL-MCNC: 45 MG/DL
VLDLC SERPL-MCNC: 9 MG/DL

## 2017-09-15 PROCEDURE — 36415 COLL VENOUS BLD VENIPUNCTURE: CPT

## 2017-09-15 PROCEDURE — 83036 HEMOGLOBIN GLYCOSYLATED A1C: CPT | Performed by: FAMILY MEDICINE

## 2017-09-15 PROCEDURE — 80061 LIPID PANEL: CPT | Performed by: FAMILY MEDICINE

## 2017-09-15 PROCEDURE — 83880 ASSAY OF NATRIURETIC PEPTIDE: CPT | Performed by: FAMILY MEDICINE

## 2017-09-15 PROCEDURE — 80053 COMPREHEN METABOLIC PANEL: CPT | Performed by: FAMILY MEDICINE

## 2017-10-06 ENCOUNTER — LAB (OUTPATIENT)
Dept: LAB | Facility: HOSPITAL | Age: 72
End: 2017-10-06

## 2017-10-06 DIAGNOSIS — I48.91 ATRIAL FIBRILLATION, UNSPECIFIED TYPE (HCC): ICD-10-CM

## 2017-10-06 LAB
INR PPP: 2.53 (ref 0.9–1.1)
PROTHROMBIN TIME: 27.7 SECONDS (ref 9.3–12.1)

## 2017-10-06 PROCEDURE — 85610 PROTHROMBIN TIME: CPT | Performed by: FAMILY MEDICINE

## 2017-10-06 PROCEDURE — 36415 COLL VENOUS BLD VENIPUNCTURE: CPT

## 2017-11-08 ENCOUNTER — LAB (OUTPATIENT)
Dept: LAB | Facility: HOSPITAL | Age: 72
End: 2017-11-08

## 2017-11-08 DIAGNOSIS — I48.91 ATRIAL FIBRILLATION, UNSPECIFIED TYPE (HCC): ICD-10-CM

## 2017-11-08 LAB
INR PPP: 3.24 (ref 0.9–1.1)
PROTHROMBIN TIME: 35.5 SECONDS (ref 9.3–12.1)

## 2017-11-08 PROCEDURE — 36415 COLL VENOUS BLD VENIPUNCTURE: CPT

## 2017-11-08 PROCEDURE — 85610 PROTHROMBIN TIME: CPT | Performed by: FAMILY MEDICINE

## 2017-12-15 ENCOUNTER — LAB (OUTPATIENT)
Dept: LAB | Facility: HOSPITAL | Age: 72
End: 2017-12-15

## 2017-12-15 DIAGNOSIS — I48.91 ATRIAL FIBRILLATION, UNSPECIFIED TYPE (HCC): ICD-10-CM

## 2017-12-15 LAB
INR PPP: 1.84 (ref 0.9–1.1)
PROTHROMBIN TIME: 20.8 SECONDS (ref 9.3–12.1)

## 2017-12-15 PROCEDURE — 85610 PROTHROMBIN TIME: CPT

## 2017-12-15 PROCEDURE — 36415 COLL VENOUS BLD VENIPUNCTURE: CPT

## 2018-01-25 ENCOUNTER — TRANSCRIBE ORDERS (OUTPATIENT)
Dept: ADMINISTRATIVE | Facility: HOSPITAL | Age: 73
End: 2018-01-25

## 2018-01-25 DIAGNOSIS — R41.3 AMNESIA: Primary | ICD-10-CM

## 2018-01-26 ENCOUNTER — HOSPITAL ENCOUNTER (OUTPATIENT)
Dept: MRI IMAGING | Facility: HOSPITAL | Age: 73
Discharge: HOME | End: 2018-01-26
Admitting: FAMILY MEDICINE

## 2018-01-26 DIAGNOSIS — R41.3 AMNESIA: ICD-10-CM

## 2018-01-26 LAB
CREAT BLD-MCNC: 1.7 MG/DL (ref 0.6–1.3)
GFR SERPL CREATININE-BSD FRML MDRD: 29 ML/MIN/1.73

## 2018-01-26 PROCEDURE — 82565 ASSAY OF CREATININE: CPT | Performed by: RADIOLOGY

## 2018-01-26 PROCEDURE — 70551 MRI BRAIN STEM W/O DYE: CPT

## 2018-02-19 ENCOUNTER — TRANSCRIBE ORDERS (OUTPATIENT)
Dept: ADMINISTRATIVE | Facility: HOSPITAL | Age: 73
End: 2018-02-19

## 2018-02-19 ENCOUNTER — APPOINTMENT (OUTPATIENT)
Dept: LAB | Facility: HOSPITAL | Age: 73
End: 2018-02-19

## 2018-02-19 DIAGNOSIS — R41.3 MEMORY LOSS: Primary | ICD-10-CM

## 2018-02-19 LAB
BASOPHILS # BLD AUTO: 0.03 10*3/MM3 (ref 0–0.2)
BASOPHILS NFR BLD AUTO: 0.7 % (ref 0–2.5)
DEPRECATED RDW RBC AUTO: 45.3 FL (ref 37–54)
EOSINOPHIL # BLD AUTO: 0.03 10*3/MM3 (ref 0–0.7)
EOSINOPHIL NFR BLD AUTO: 0.7 % (ref 0–7)
ERYTHROCYTE [DISTWIDTH] IN BLOOD BY AUTOMATED COUNT: 12.7 % (ref 11.5–14.5)
HCT VFR BLD AUTO: 31.9 % (ref 37–47)
HGB BLD-MCNC: 10.5 G/DL (ref 12–16)
IMM GRANULOCYTES # BLD: 0.04 10*3/MM3 (ref 0–0.06)
IMM GRANULOCYTES NFR BLD: 0.9 % (ref 0–0.6)
INR PPP: 2.29 (ref 0.9–1.1)
LYMPHOCYTES # BLD AUTO: 1.23 10*3/MM3 (ref 0.6–3.4)
LYMPHOCYTES NFR BLD AUTO: 27 % (ref 10–50)
MCH RBC QN AUTO: 32.1 PG (ref 27–31)
MCHC RBC AUTO-ENTMCNC: 32.9 G/DL (ref 30–37)
MCV RBC AUTO: 97.6 FL (ref 81–99)
MONOCYTES # BLD AUTO: 0.41 10*3/MM3 (ref 0–0.9)
MONOCYTES NFR BLD AUTO: 9 % (ref 0–12)
NEUTROPHILS # BLD AUTO: 2.81 10*3/MM3 (ref 2–6.9)
NEUTROPHILS NFR BLD AUTO: 61.7 % (ref 37–80)
NRBC BLD MANUAL-RTO: 0 /100 WBC (ref 0–0)
PLATELET # BLD AUTO: 171 10*3/MM3 (ref 130–400)
PMV BLD AUTO: 10.1 FL (ref 6–12)
PROTHROMBIN TIME: 25.9 SECONDS (ref 9.3–12.1)
RBC # BLD AUTO: 3.27 10*6/MM3 (ref 4.2–5.4)
T4 FREE SERPL-MCNC: 1.35 NG/DL (ref 0.78–2.19)
TSH SERPL DL<=0.05 MIU/L-ACNC: 1.47 MIU/ML (ref 0.47–4.68)
VIT B12 BLD-MCNC: 233 PG/ML (ref 239–931)
WBC NRBC COR # BLD: 4.55 10*3/MM3 (ref 4.8–10.8)

## 2018-02-19 PROCEDURE — 84443 ASSAY THYROID STIM HORMONE: CPT | Performed by: FAMILY MEDICINE

## 2018-02-19 PROCEDURE — 85025 COMPLETE CBC W/AUTO DIFF WBC: CPT | Performed by: FAMILY MEDICINE

## 2018-02-19 PROCEDURE — 84439 ASSAY OF FREE THYROXINE: CPT | Performed by: FAMILY MEDICINE

## 2018-02-19 PROCEDURE — 82607 VITAMIN B-12: CPT | Performed by: FAMILY MEDICINE

## 2018-02-19 PROCEDURE — 36415 COLL VENOUS BLD VENIPUNCTURE: CPT | Performed by: FAMILY MEDICINE

## 2018-02-19 PROCEDURE — 85610 PROTHROMBIN TIME: CPT | Performed by: FAMILY MEDICINE

## 2018-03-26 ENCOUNTER — TRANSCRIBE ORDERS (OUTPATIENT)
Dept: LAB | Facility: HOSPITAL | Age: 73
End: 2018-03-26

## 2018-03-26 ENCOUNTER — LAB (OUTPATIENT)
Dept: LAB | Facility: HOSPITAL | Age: 73
End: 2018-03-26

## 2018-03-26 DIAGNOSIS — I48.91 ATRIAL FIBRILLATION, UNSPECIFIED TYPE (HCC): ICD-10-CM

## 2018-03-26 DIAGNOSIS — I48.91 ATRIAL FIBRILLATION, UNSPECIFIED TYPE (HCC): Primary | ICD-10-CM

## 2018-03-26 LAB
INR PPP: 1.52 (ref 0.9–1.1)
PROTHROMBIN TIME: 16.9 SECONDS (ref 9.3–12.1)

## 2018-03-26 PROCEDURE — 85610 PROTHROMBIN TIME: CPT

## 2018-03-26 PROCEDURE — 36415 COLL VENOUS BLD VENIPUNCTURE: CPT

## 2018-04-03 ENCOUNTER — TRANSCRIBE ORDERS (OUTPATIENT)
Dept: ADMINISTRATIVE | Facility: HOSPITAL | Age: 73
End: 2018-04-03

## 2018-04-03 ENCOUNTER — APPOINTMENT (OUTPATIENT)
Dept: LAB | Facility: HOSPITAL | Age: 73
End: 2018-04-03

## 2018-04-03 ENCOUNTER — OFFICE VISIT (OUTPATIENT)
Dept: NEUROLOGY | Facility: CLINIC | Age: 73
End: 2018-04-03

## 2018-04-03 VITALS
BODY MASS INDEX: 34.2 KG/M2 | WEIGHT: 193 LBS | OXYGEN SATURATION: 99 % | HEIGHT: 63 IN | SYSTOLIC BLOOD PRESSURE: 138 MMHG | DIASTOLIC BLOOD PRESSURE: 82 MMHG | HEART RATE: 59 BPM

## 2018-04-03 DIAGNOSIS — E78.5 HYPERLIPIDEMIA, UNSPECIFIED HYPERLIPIDEMIA TYPE: ICD-10-CM

## 2018-04-03 DIAGNOSIS — F02.818 LATE ONSET ALZHEIMER'S DISEASE WITH BEHAVIORAL DISTURBANCE (HCC): ICD-10-CM

## 2018-04-03 DIAGNOSIS — I10 HYPERTENSION, ESSENTIAL: Primary | ICD-10-CM

## 2018-04-03 DIAGNOSIS — I67.89 CEREBRAL MICROVASCULAR DISEASE: ICD-10-CM

## 2018-04-03 DIAGNOSIS — G30.1 LATE ONSET ALZHEIMER'S DISEASE WITH BEHAVIORAL DISTURBANCE (HCC): ICD-10-CM

## 2018-04-03 DIAGNOSIS — E53.8 VITAMIN B12 DEFICIENCY: Primary | ICD-10-CM

## 2018-04-03 DIAGNOSIS — E11.9 DIABETES MELLITUS WITHOUT COMPLICATION (HCC): ICD-10-CM

## 2018-04-03 LAB
ALBUMIN SERPL-MCNC: 4.5 G/DL (ref 3.5–5)
ALBUMIN/GLOB SERPL: 1.7 G/DL (ref 1–2)
ALP SERPL-CCNC: 71 U/L (ref 38–126)
ALT SERPL W P-5'-P-CCNC: 36 U/L (ref 13–69)
ANION GAP SERPL CALCULATED.3IONS-SCNC: 17.7 MMOL/L (ref 10–20)
AST SERPL-CCNC: 31 U/L (ref 15–46)
BASOPHILS # BLD AUTO: 0.03 10*3/MM3 (ref 0–0.2)
BASOPHILS NFR BLD AUTO: 0.6 % (ref 0–2.5)
BILIRUB SERPL-MCNC: 1 MG/DL (ref 0.2–1.3)
BUN BLD-MCNC: 29 MG/DL (ref 7–20)
BUN/CREAT SERPL: 19.3 (ref 7.1–23.5)
CALCIUM SPEC-SCNC: 10.6 MG/DL (ref 8.4–10.2)
CHLORIDE SERPL-SCNC: 98 MMOL/L (ref 98–107)
CHOLEST SERPL-MCNC: 129 MG/DL (ref 0–199)
CO2 SERPL-SCNC: 27 MMOL/L (ref 26–30)
CREAT BLD-MCNC: 1.5 MG/DL (ref 0.6–1.3)
DEPRECATED RDW RBC AUTO: 46.3 FL (ref 37–54)
EOSINOPHIL # BLD AUTO: 0.03 10*3/MM3 (ref 0–0.7)
EOSINOPHIL NFR BLD AUTO: 0.6 % (ref 0–7)
ERYTHROCYTE [DISTWIDTH] IN BLOOD BY AUTOMATED COUNT: 13.1 % (ref 11.5–14.5)
GFR SERPL CREATININE-BSD FRML MDRD: 34 ML/MIN/1.73
GLOBULIN UR ELPH-MCNC: 2.6 GM/DL
GLUCOSE BLD-MCNC: 106 MG/DL (ref 74–98)
HBA1C MFR BLD: 6.3 % (ref 3–6)
HCT VFR BLD AUTO: 32.4 % (ref 37–47)
HDLC SERPL-MCNC: 70 MG/DL (ref 40–60)
HGB BLD-MCNC: 10.7 G/DL (ref 12–16)
IMM GRANULOCYTES # BLD: 0.02 10*3/MM3 (ref 0–0.06)
IMM GRANULOCYTES NFR BLD: 0.4 % (ref 0–0.6)
INR PPP: 1.57 (ref 0.9–1.1)
LDLC SERPL CALC-MCNC: 49 MG/DL (ref 0–99)
LDLC/HDLC SERPL: 0.71 {RATIO}
LYMPHOCYTES # BLD AUTO: 1.09 10*3/MM3 (ref 0.6–3.4)
LYMPHOCYTES NFR BLD AUTO: 23.1 % (ref 10–50)
MCH RBC QN AUTO: 32 PG (ref 27–31)
MCHC RBC AUTO-ENTMCNC: 33 G/DL (ref 30–37)
MCV RBC AUTO: 97 FL (ref 81–99)
MONOCYTES # BLD AUTO: 0.38 10*3/MM3 (ref 0–0.9)
MONOCYTES NFR BLD AUTO: 8.1 % (ref 0–12)
NEUTROPHILS # BLD AUTO: 3.16 10*3/MM3 (ref 2–6.9)
NEUTROPHILS NFR BLD AUTO: 67.2 % (ref 37–80)
NRBC BLD MANUAL-RTO: 0 /100 WBC (ref 0–0)
PLATELET # BLD AUTO: 172 10*3/MM3 (ref 130–400)
PMV BLD AUTO: 9.5 FL (ref 6–12)
POTASSIUM BLD-SCNC: 4.7 MMOL/L (ref 3.5–5.1)
PROT SERPL-MCNC: 7.1 G/DL (ref 6.3–8.2)
PROTHROMBIN TIME: 17.4 SECONDS (ref 9.3–12.1)
RBC # BLD AUTO: 3.34 10*6/MM3 (ref 4.2–5.4)
SODIUM BLD-SCNC: 138 MMOL/L (ref 137–145)
TRIGL SERPL-MCNC: 48 MG/DL
VLDLC SERPL-MCNC: 9.6 MG/DL
WBC NRBC COR # BLD: 4.71 10*3/MM3 (ref 4.8–10.8)

## 2018-04-03 PROCEDURE — 85025 COMPLETE CBC W/AUTO DIFF WBC: CPT | Performed by: FAMILY MEDICINE

## 2018-04-03 PROCEDURE — 83036 HEMOGLOBIN GLYCOSYLATED A1C: CPT | Performed by: FAMILY MEDICINE

## 2018-04-03 PROCEDURE — 99204 OFFICE O/P NEW MOD 45 MIN: CPT | Performed by: PSYCHIATRY & NEUROLOGY

## 2018-04-03 PROCEDURE — 80053 COMPREHEN METABOLIC PANEL: CPT | Performed by: FAMILY MEDICINE

## 2018-04-03 PROCEDURE — 36415 COLL VENOUS BLD VENIPUNCTURE: CPT | Performed by: FAMILY MEDICINE

## 2018-04-03 PROCEDURE — 80061 LIPID PANEL: CPT | Performed by: FAMILY MEDICINE

## 2018-04-03 PROCEDURE — 85610 PROTHROMBIN TIME: CPT

## 2018-04-03 RX ORDER — DONEPEZIL HYDROCHLORIDE 5 MG/1
5 TABLET, FILM COATED ORAL NIGHTLY
Qty: 30 TABLET | Refills: 2 | Status: SHIPPED | OUTPATIENT
Start: 2018-04-03 | End: 2018-04-17 | Stop reason: SDUPTHER

## 2018-04-03 NOTE — PROGRESS NOTES
"[facility] NEUROLOGY [place] CONSULTATION   History of Present Illness     Date: 4/3/2018    Patient Identification  Tammy Pascual is a 72 y.o. female.    Patient information was obtained from patient.  History/Exam limitations: none.    CONSULTATION requested by: Sravani Grijalva MD      Chief Complaint   Memory Loss (NP, in office today for consult. Pt c/o memory \"issues\", having difficulties performing tasks she has done for years. Example pt uses, filling out bills and knowing what she needs to write but being unable to do so  ) and Speech Problem (Pt c/o difficulty speaking, especially when speaking quickly. States she will \"just go blank\" )      History of Present Illness   Patient is a pleasant 72-year-old referred to [facility] neurology [place] for evaluation of progressive cognitive decline.  Patient reports that she has been noticed to have memory issue after she stopped using oxycodone shortly after she had her back surgery.  Patient finds having difficulty with word finding.  She often have to slow down during conversation to choose the right word.  The when she is under stress her symptoms become more apparent.  Patient underwent MRI of the brain which show global atrophy along with microvascular ischemic changes.  Patient does have hypertension, diabetes and hyperlipidemia and sleep apnea.  Patient also history of breast cancer but there is no contrast enhancement to show evidence of CNS metastasis.  Lab work also show vitamin B12 deficiency.    PMH:   Past Medical History:   Diagnosis Date   • Anemia    • Arthritis    • Cancer     breast   • Congestive heart failure    • Diabetes mellitus     DIET CONTROLLED   • Disease of thyroid gland    • Hearing loss    • Heart disease    • Hyperlipidemia    • Hypertension    • Kidney disease    • Sleep apnea        Past Surgical History:   Past Surgical History:   Procedure Laterality Date   • BACK SURGERY  2015   • BREAST BIOPSY  1998   • BREAST " EXCISIONAL BIOPSY Left 02/08/1999   • BREAST RECONSTRUCTION Bilateral    • CAROTID ARTERY ANGIOPLASTY Right 2008    ([facility])   • CATARACT EXTRACTION Bilateral    • COLONOSCOPY N/A 8/17/2017    Procedure: COLONOSCOPY;  Surgeon: Sindy Clement MD;  Location: [facility] ENDOSCOPY;  Service:    • HYSTERECTOMY     • MASTECTOMY Bilateral 2010       Family Hisotry:   Family History   Problem Relation Age of Onset   • Cancer Mother      breast   • Diabetes Mother    • Heart disease Father    • Stroke Father    • Hypertension Father    • Breast cancer Maternal Grandmother    • Breast cancer Paternal Grandmother        Social History:   Social History     Social History   • Marital status: Single     Spouse name: N/A   • Number of children: N/A   • Years of education: N/A     Occupational History   • Not on file.     Social History Main Topics   • Smoking status: Former Smoker     Quit date: 6/20/2003   • Smokeless tobacco: Never Used   • Alcohol use Yes   • Drug use: No   • Sexual activity: Not on file     Other Topics Concern   • Not on file     Social History Narrative   • No narrative on file       Medications:   Current Outpatient Prescriptions   Medication Sig Dispense Refill   • acetaminophen (TYLENOL) 325 MG tablet Take 650 mg by mouth Every 6 (Six) Hours As Needed for Mild Pain (1-3).     • aspirin 81 MG EC tablet Take 81 mg by mouth Daily.     • atorvastatin (LIPITOR) 40 MG tablet Take 40 mg by mouth Daily.     • carvedilol (COREG) 25 MG tablet Take 25 mg by mouth 2 (two) times a day.     • Cholecalciferol (VITAMIN D3) 5000 UNITS capsule capsule Take 5,000 Units by mouth Daily.     • ferrous sulfate 325 (65 FE) MG tablet   5   • gabapentin (NEURONTIN) 600 MG tablet Take 600 mg by mouth 2 (Two) Times a Day.     • hydrochlorothiazide (HYDRODIURIL) 25 MG tablet Take 25 mg by mouth Daily.     • losartan (COZAAR) 50 MG tablet Take 50 mg by mouth Daily.     • spironolactone (ALDACTONE) 25 MG tablet Take  "25 mg by mouth Daily.     • warfarin (COUMADIN) 1 MG tablet Take 1 mg by mouth every 5 (five) minutes as needed.     • warfarin (COUMADIN) 4 MG tablet      • donepezil (ARICEPT) 5 MG tablet Take 1 tablet by mouth Every Night. 30 tablet 2     No current facility-administered medications for this visit.        Allergy:   Allergies   Allergen Reactions   • Cephalosporins Anaphylaxis   • Quinolones Anaphylaxis   • Ciprofloxacin    • Keflex [Cephalexin] Hives       Review of Systems:  Review of Systems   Constitutional: Positive for fatigue. Negative for chills and fever.   HENT: Negative for congestion, ear pain, hearing loss, rhinorrhea and sore throat.    Eyes: Negative for pain, discharge and redness.   Respiratory: Negative for cough, shortness of breath, wheezing and stridor.    Cardiovascular: Negative for chest pain, palpitations and leg swelling.   Gastrointestinal: Negative for abdominal pain, constipation, nausea and vomiting.   Endocrine: Negative for cold intolerance, heat intolerance and polyphagia.   Genitourinary: Negative for dysuria, flank pain, frequency and urgency.   Musculoskeletal: Negative for joint swelling, myalgias, neck pain and neck stiffness.   Skin: Negative for pallor, rash and wound.   Allergic/Immunologic: Negative for environmental allergies.   Neurological: Positive for speech difficulty. Negative for dizziness, tremors, seizures, syncope, facial asymmetry, weakness, light-headedness, numbness and headaches.   Hematological: Negative for adenopathy.   Psychiatric/Behavioral: Positive for confusion, decreased concentration and sleep disturbance. Negative for hallucinations. The patient is nervous/anxious.        Physical Exam     Vitals:    04/03/18 1055   BP: 138/82   Pulse: 59   SpO2: 99%   Weight: 87.5 kg (193 lb)   Height: 160 cm (63\")     GENERAL: Patient is pleasant, cooperative, appears to be stated age.  Body habitus is endomorphic.  SKIN AND EXTREMITIES:  No skin rashes or " lesions are noted.  No cyanosis, clubbing or edema of the extremities.    HEAD:  Head is normocephalic and atraumatic.    NECK: Neck are non-tender without thyromegaly or adenopathy.  Carotic upstrokes are 1+/4.  No cranial or cervical bruits.  The neck is supple with a full range of motion.   ENT: palate elevate symmetrically, no evidence of high arch palate, tongue midline erythema in posterior pharynx, Mallampati Classification Class III   CARDIOVASCULAR:  Regular rate and rhythm with normal S1 and S2 without rub or gallop.  RESPIRATORY:  Clear to auscultation without wheezes or crackle   ABDOMEN:  Soft and non-tender, positive bowel sound without hepatosplenomegaly  BACK:  Back is straight without midline defect.    PSYCH:  Higher cortical function/mental status:  The patient is alert.  She is oriented x3 to time, place and person.  Recent and the remote memory appear normal.  The patient has a good fund of knowledge.  There is no visual or auditory hallucination or suicidal or homicidal ideation.  SPEECH:There is no gross evidence of aphasia, dysarthria or agnosia.      CRANIAL NERVES:  Pupils are 4mm, equal round reactive to light, reacting briskly to 2mm without afferent pupillary defect.  Visual fields are intact to confrontation testing.  Fundoscopic examination reveals sharp disk margins with normal vasculature.  No papilledema, hemorrhages or exudates.  Extraocular movements are full and smooth with normal pursuits and saccades.  No nystagmus noted.  The face is symmetric. palate elevate symmetrically, Tongue midline, positive gag reflex. The remainder of the cranial nerves are intact and symmetrical.    MOTOR: Strength is 5/5 throughout with normal tone and bulk with the following exceptions, 4/5 intrinsic muscles of the hands and feet.  No involuntary movements noted.    Deep Tendon Reflexes: are 2/4 and symmetrical in the upper extremities, 2/4 and symmetrical at the knees and 1/4 and symmetrical at  the Achilles tendon.  Plantar responses were down-going bilaterally.    SENSATION:  Intact to pinprick, light touch, vibration and proprioception.  Coordination:  The patient normally performs finger-nose-finger, heel-to-knee-to-shin and rapid alternating movements in symmetrical fashion.    COORDINATION AND GAIT:  The patient walks with a narrow-based gait.  Patient is able to heel-toe and tandem walk forward and backwards without difficulty.  Romberg and monopedal  Romberg are negative.    MUSCULOSKELETAL: Range of motion normal, no clubbing, cyanosis, or edema.  No joint swelling.            Studies: I have personally reviewed the following and discussed with the patient.  Results for orders placed or performed in visit on 03/26/18   Protime-INR   Result Value Ref Range    Protime 16.9 (H) 9.3 - 12.1 Seconds    INR 1.52 (H) 0.90 - 1.10       Review of Imaging: I have personally reviewed the following images and discussed with the patient.  MRI of the brain show global atrophy with microvascular ischemic changes    Records Reviewed: I have personally reviewed her previous medical record.    Tammy was seen today for memory loss and speech problem.    Diagnoses and all orders for this visit:    Vitamin B12 deficiency    Cerebral microvascular disease    Late onset Alzheimer's disease with behavioral disturbance    Other orders  -     donepezil (ARICEPT) 5 MG tablet; Take 1 tablet by mouth Every Night.        Treatments:  1.  Status post patient on donepezil 5 mg at bedtime  2.  Counseled patient extensively on Alzheimer's disease as follow  Discussion:  I have counseled patient extensively on the pathophysiology of Alzheimer's disease. Alzheimer's disease is becoming an increasingly bigger concern over the next 50 years. The incidence of Alzheimer's is expected to quadruple, affecting one in 45 Americans.   We have also discussed several different types of medications are available to treat the memory loss, behavior  changes, sleep problem of Alzheimer's disease. Three cholinesterase inhibitors are approved for Alzheimer's disease therapy: Donepezil (Aricept) is approved to treat mild, moderate, and severe Alzheimer's. Rivastigminen (Exelon) and galantamine (Razadyne) are approved to treat mild to moderate Alzheimer's.  Exelon is now also available in a skin patch, which is easier for some patients to use and is slowly released throughout the day.  Side effects of the cholinesterase inhibitors include nausea, vomiting, diarrhea, weight loss, and dizziness.  Memantine (Namenda) works by regulating the amount of glutamate in the brain. Namenda is approved for moderate to severe Alzheimer's disease. Side effects include dizziness, confusion, headache, constipation, nausea, and agitation. Because Namenda does not work the same way as a cholinesterase inhibitor, it may be used in combination with one.  Alternative therapies have been discussed including sensory therapies such as music therapy and art therapy can improve Alzheimer's patients' mood, behavior, and day-to-day function. By stimulating the senses, these therapies may help trigger memory recall and enable Alzheimer's patients to reconnect with the world around them. Other alternative remedies, including coenzyme Q10, coral calcium , huperzine A, and omega-3 fatty acids to prevent or treat Alzheimer's disease have been mentioned during counseling. However, there is not yet enough research on these treatments to recommend using any of them as an Alzheimer's therapy.  This Document is signed by Yonatan You MD, FAAN, FAASM April 3, 10737:31 PM

## 2018-04-17 ENCOUNTER — TELEPHONE (OUTPATIENT)
Dept: NEUROLOGY | Facility: CLINIC | Age: 73
End: 2018-04-17

## 2018-04-18 ENCOUNTER — TRANSCRIBE ORDERS (OUTPATIENT)
Dept: ADMINISTRATIVE | Facility: HOSPITAL | Age: 73
End: 2018-04-18

## 2018-04-18 DIAGNOSIS — I65.29 OCCLUSION AND STENOSIS OF CAROTID ARTERY: Primary | ICD-10-CM

## 2018-04-18 DIAGNOSIS — I65.23 CAROTID ATHEROSCLEROSIS, BILATERAL: ICD-10-CM

## 2018-04-19 RX ORDER — DONEPEZIL HYDROCHLORIDE 5 MG/1
5 TABLET, FILM COATED ORAL NIGHTLY
Qty: 90 TABLET | Refills: 0 | Status: SHIPPED | OUTPATIENT
Start: 2018-04-19 | End: 2018-06-12 | Stop reason: SDUPTHER

## 2018-04-24 ENCOUNTER — LAB (OUTPATIENT)
Dept: LAB | Facility: HOSPITAL | Age: 73
End: 2018-04-24

## 2018-04-24 DIAGNOSIS — I48.91 ATRIAL FIBRILLATION, UNSPECIFIED TYPE (HCC): ICD-10-CM

## 2018-04-24 LAB
INR PPP: 1.66 (ref 0.9–1.1)
PROTHROMBIN TIME: 18.4 SECONDS (ref 9.3–12.1)

## 2018-04-24 PROCEDURE — 85610 PROTHROMBIN TIME: CPT

## 2018-04-24 PROCEDURE — 36415 COLL VENOUS BLD VENIPUNCTURE: CPT

## 2018-05-07 ENCOUNTER — LAB (OUTPATIENT)
Dept: LAB | Facility: HOSPITAL | Age: 73
End: 2018-05-07

## 2018-05-07 DIAGNOSIS — I48.91 ATRIAL FIBRILLATION, UNSPECIFIED TYPE (HCC): ICD-10-CM

## 2018-05-07 LAB
INR PPP: 1.78 (ref 0.9–1.1)
PROTHROMBIN TIME: 19.7 SECONDS (ref 9.3–12.1)

## 2018-05-07 PROCEDURE — 85610 PROTHROMBIN TIME: CPT

## 2018-05-07 PROCEDURE — 36415 COLL VENOUS BLD VENIPUNCTURE: CPT

## 2018-05-24 ENCOUNTER — LAB (OUTPATIENT)
Dept: LAB | Facility: HOSPITAL | Age: 73
End: 2018-05-24

## 2018-05-24 DIAGNOSIS — I48.91 ATRIAL FIBRILLATION, UNSPECIFIED TYPE (HCC): ICD-10-CM

## 2018-05-24 LAB
INR PPP: 3.46 (ref 0.9–1.1)
PROTHROMBIN TIME: 37.7 SECONDS (ref 9.3–12.1)

## 2018-05-24 PROCEDURE — 85610 PROTHROMBIN TIME: CPT

## 2018-05-24 PROCEDURE — 36415 COLL VENOUS BLD VENIPUNCTURE: CPT

## 2018-05-31 ENCOUNTER — OFFICE VISIT (OUTPATIENT)
Dept: ONCOLOGY | Facility: CLINIC | Age: 73
End: 2018-05-31

## 2018-05-31 VITALS
SYSTOLIC BLOOD PRESSURE: 140 MMHG | TEMPERATURE: 97.4 F | HEIGHT: 63 IN | BODY MASS INDEX: 34.73 KG/M2 | DIASTOLIC BLOOD PRESSURE: 73 MMHG | WEIGHT: 196 LBS | HEART RATE: 60 BPM | RESPIRATION RATE: 14 BRPM

## 2018-05-31 DIAGNOSIS — C50.411 MALIGNANT NEOPLASM OF UPPER-OUTER QUADRANT OF RIGHT BREAST IN FEMALE, ESTROGEN RECEPTOR POSITIVE (HCC): Primary | ICD-10-CM

## 2018-05-31 DIAGNOSIS — Z17.0 MALIGNANT NEOPLASM OF UPPER-OUTER QUADRANT OF RIGHT BREAST IN FEMALE, ESTROGEN RECEPTOR POSITIVE (HCC): Primary | ICD-10-CM

## 2018-05-31 PROCEDURE — 99213 OFFICE O/P EST LOW 20 MIN: CPT | Performed by: INTERNAL MEDICINE

## 2018-05-31 NOTE — PROGRESS NOTES
PROBLEM LIST:  Oncology/Hematology History    1. History of left breast ductal carcinoma in situ, noncomedo with a [place]  Prognostic Index of 4. Status post excisional biopsy 02/08/1999 with a 1.5 cm  maximum dimension. She received 5 years of adjuvant treatment with tamoxifen  ending March 2004. She also received radiation therapy to the left breast  ending April 1999.   2. History of separate right breast cancer diagnosed May 2010. Stage I,  [id number] invasive moderately differentiated ductal carcinoma, [place] 7  out of 9, with no perineural or lymphvascular invasion with intermediate grade  ductal carcinoma in situ with necrosis and calcifications on biopsy 04/26/2010.  This was in the 12 o'clock position. ER/IN positive, HER-2/elvis 2+ by  immunohistochemistry, negative by FISH, 1.5 cm primary, 0 out of 2 node  negative.  a) Oncotype score 32 showing her to have a 22% distance recurrence risk on  hormonal therapy alone.  b) Status post adjuvant Taxotere and Cytoxan x4.  c) Began Arimidex May 2010.  d) Status post bilateral mastectomy followed by reconstruction.  3. History of left upper lobe small pulmonary nodules stable on serial scans,  most recent scan 05/13/2011 the nodules had become definitely calcified and  consistent with granulomas and no further scanning planned.  4. History of atrial fibrillation with rapid ventricular response under the  care of Dr. Campbell.        Breast cancer    5/25/2010 Initial Diagnosis     Breast cancer            REASON FOR VISIT: History of breast cancer    HISTORY OF PRESENT ILLNESS:   72 y.o.  female presents today for follow-up of her stage I breast cancer.  She had bilateral mastectomies followed by chemotherapy and 5 years of adjuvant Arimidex.  She is been off Arimidex for 3 years now.  Clinically doing reasonably well.    Past medical history, social history and family history was reviewed and unchanged from prior visit.    Review of Systems:    Review of  Systems - Oncology   A comprehensive 14 point review of systems was performed and was negative except as mentioned.      Medications:  The current medication list was reviewed in the EMR    ALLERGIES:    Allergies   Allergen Reactions   • Cephalosporins Anaphylaxis   • Quinolones Anaphylaxis   • Ciprofloxacin    • Keflex [Cephalexin] Hives         Physical Exam    VITAL SIGNS:  There were no vitals taken for this visit.    Wt Readings from Last 3 Encounters:   04/03/18 87.5 kg (193 lb)   08/07/17 88.5 kg (195 lb)   07/25/17 89.4 kg (197 lb 3.2 oz)        Performance Status: 0    General: well appearing, in no acute distress  HEENT: sclera anicteric, oropharynx clear, neck is supple  Lymphatics: no cervical, supraclavicular, or axillary adenopathy  Cardiovascular: regular rate and rhythm, no murmurs, rubs or gallops  Lungs: clear to auscultation bilaterally  Abdomen: soft, nontender, nondistended.  No palpable organomegaly  Extremities: no lower extremity edema  Skin: no rashes, lesions, bruising, or petechiae  Msk:  Shows no weakness of the large muscle groups  Psych: Mood is stable        RECENT LABS:    Lab Results   Component Value Date    HGB 10.7 (L) 04/03/2018    HCT 32.4 (L) 04/03/2018    MCV 97.0 04/03/2018     04/03/2018    WBC 4.71 (L) 04/03/2018    NEUTROABS 3.16 04/03/2018    LYMPHSABS 1.09 04/03/2018    MONOSABS 0.38 04/03/2018    EOSABS 0.03 04/03/2018    BASOSABS 0.03 04/03/2018       Lab Results   Component Value Date    GLUCOSE 106 (H) 04/03/2018    BUN 29 (H) 04/03/2018    CREATININE 1.50 (H) 04/03/2018     04/03/2018    K 4.7 04/03/2018    CL 98 04/03/2018    CO2 27.0 04/03/2018    CALCIUM 10.6 (H) 04/03/2018    PROTEINTOT 7.1 04/03/2018    ALBUMIN 4.50 04/03/2018    BILITOT 1.0 04/03/2018    ALKPHOS 71 04/03/2018    AST 31 04/03/2018    ALT 36 04/03/2018           Assessment/Plan    1. Stage I breast cancer: Patient is now 3 years out from completion of adjuvant hormone therapy.   Clinically has issues with sciatica.  She had imaging done 1 year ago which showed significant arthritis.  But no recurrence.  She is otherwise doing well.  No clinical sign of recurrence.  She had bilateral mastectomies so she does not need any imaging.  I will have her see or NP Ms. Leach in one year for Survivorship.            I spent 15 minutes on the patient's plan and care with more than 50% spent on counseling the patient.      Gabino Ying MD  [facility] Hematology and Oncology    5/31/2018         Please note that portions of this note may have been completed with a voice recognition program. Efforts were made to edit the dictations, but occasionally words are mistranscribed.

## 2018-06-12 RX ORDER — DONEPEZIL HYDROCHLORIDE 5 MG/1
5 TABLET, FILM COATED ORAL NIGHTLY
Qty: 90 TABLET | Refills: 0 | Status: SHIPPED | OUTPATIENT
Start: 2018-06-12 | End: 2018-07-10 | Stop reason: SDUPTHER

## 2018-06-26 ENCOUNTER — LAB (OUTPATIENT)
Dept: LAB | Facility: HOSPITAL | Age: 73
End: 2018-06-26

## 2018-06-26 DIAGNOSIS — I48.91 ATRIAL FIBRILLATION, UNSPECIFIED TYPE (HCC): ICD-10-CM

## 2018-06-26 LAB
INR PPP: 2.68 (ref 0.9–1.1)
PROTHROMBIN TIME: 29.4 SECONDS (ref 9.3–12.1)

## 2018-06-26 PROCEDURE — 85610 PROTHROMBIN TIME: CPT

## 2018-06-26 PROCEDURE — 36415 COLL VENOUS BLD VENIPUNCTURE: CPT

## 2018-07-10 ENCOUNTER — OFFICE VISIT (OUTPATIENT)
Dept: NEUROLOGY | Facility: CLINIC | Age: 73
End: 2018-07-10

## 2018-07-10 VITALS
HEART RATE: 61 BPM | OXYGEN SATURATION: 99 % | HEIGHT: 63 IN | WEIGHT: 196 LBS | DIASTOLIC BLOOD PRESSURE: 72 MMHG | SYSTOLIC BLOOD PRESSURE: 134 MMHG | BODY MASS INDEX: 34.73 KG/M2

## 2018-07-10 DIAGNOSIS — G30.1 LATE ONSET ALZHEIMER'S DISEASE WITH BEHAVIORAL DISTURBANCE (HCC): Primary | ICD-10-CM

## 2018-07-10 DIAGNOSIS — F02.818 LATE ONSET ALZHEIMER'S DISEASE WITH BEHAVIORAL DISTURBANCE (HCC): Primary | ICD-10-CM

## 2018-07-10 PROCEDURE — 99213 OFFICE O/P EST LOW 20 MIN: CPT | Performed by: PSYCHIATRY & NEUROLOGY

## 2018-07-10 RX ORDER — DONEPEZIL HYDROCHLORIDE 5 MG/1
5 TABLET, FILM COATED ORAL NIGHTLY
Qty: 90 TABLET | Refills: 0 | Status: SHIPPED | OUTPATIENT
Start: 2018-07-10 | End: 2018-07-10 | Stop reason: SDUPTHER

## 2018-07-10 RX ORDER — DONEPEZIL HYDROCHLORIDE 5 MG/1
5 TABLET, FILM COATED ORAL NIGHTLY
Qty: 90 TABLET | Refills: 3 | Status: SHIPPED | OUTPATIENT
Start: 2018-07-10 | End: 2018-12-10 | Stop reason: DRUGHIGH

## 2018-07-10 NOTE — PROGRESS NOTES
[facility] NEUROLOGY [place] PROGRESS NOTE  History of Present Illness     Date: 7/10/2018    Patient Identification  Tammy Pascual is a 72 y.o. female.    Patient information was obtained from patient.  History/Exam limitations: none.    Original consultation requested by: Dr. Grijalva      Chief Complaint   Memory Loss (Pt in office today for 3 month follow up. 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  Patient denies any side effect with 5 mg at bedtime.  She would like to continue on current treatment since she just refilled that medication.    PMH:   Past Medical History:   Diagnosis Date   • Anemia    • Arthritis    • Cancer (CMS/HCC)     breast   • Congestive heart failure (CMS/HCC)    • Diabetes mellitus (CMS/HCC)     DIET CONTROLLED   • Disease of thyroid gland    • Hearing loss    • Heart disease    • Hyperlipidemia    • Hypertension    • Kidney disease    • Sleep apnea        Past Surgical History:   Past Surgical History:   Procedure Laterality Date   • BACK SURGERY  2015   • BREAST BIOPSY  1998   • BREAST EXCISIONAL BIOPSY Left 02/08/1999   • BREAST RECONSTRUCTION Bilateral    • CAROTID ARTERY ANGIOPLASTY Right 2008    ([facility])   • CATARACT EXTRACTION Bilateral    • COLONOSCOPY N/A 8/17/2017    Procedure: COLONOSCOPY;  Surgeon: Sindy Clement MD;  Location: [facility] ENDOSCOPY;  Service:    • HYSTERECTOMY     • MASTECTOMY Bilateral 2010       Family Hisotry:   Family History   Problem Relation Age of Onset   • Cancer Mother         breast   • Diabetes Mother    • Heart disease Father    • Stroke Father    • Hypertension Father    • Breast cancer Maternal Grandmother    • Breast cancer  Paternal Grandmother        Social History:   Social History     Social History   • Marital status: Single     Spouse name: N/A   • Number of children: N/A   • Years of education: N/A     Occupational History   • Not on file.     Social History Main Topics   • Smoking status: Former Smoker     Quit date: 6/20/2003   • Smokeless tobacco: Never Used   • Alcohol use Yes   • Drug use: No   • Sexual activity: Not on file     Other Topics Concern   • Not on file     Social History Narrative   • No narrative on file       Medications:   Current Outpatient Prescriptions   Medication Sig Dispense Refill   • acetaminophen (TYLENOL) 325 MG tablet Take 650 mg by mouth Every 6 (Six) Hours As Needed for Mild Pain (1-3).     • aspirin 81 MG EC tablet Take 81 mg by mouth Daily.     • atorvastatin (LIPITOR) 40 MG tablet Take 40 mg by mouth Daily.     • carvedilol (COREG) 25 MG tablet Take 25 mg by mouth 2 (two) times a day.     • Cholecalciferol (VITAMIN D3) 5000 UNITS capsule capsule Take 5,000 Units by mouth Daily.     • ferrous sulfate 325 (65 FE) MG tablet   5   • gabapentin (NEURONTIN) 600 MG tablet Take 600 mg by mouth 2 (Two) Times a Day.     • losartan (COZAAR) 50 MG tablet Take 50 mg by mouth Daily.     • spironolactone (ALDACTONE) 25 MG tablet Take 25 mg by mouth Daily.     • warfarin (COUMADIN) 1 MG tablet Take 1 mg by mouth every 5 (five) minutes as needed.     • warfarin (COUMADIN) 4 MG tablet      • donepezil (ARICEPT) 5 MG tablet Take 1 tablet by mouth Every Night. 90 tablet 3     No current facility-administered medications for this visit.        Allergy:   Allergies   Allergen Reactions   • Cephalosporins Anaphylaxis   • Quinolones Anaphylaxis   • Ciprofloxacin    • Keflex [Cephalexin] Hives       Review of Systems:  Review of Systems   Constitutional: Positive for fatigue. Negative for chills and fever.   HENT: Negative for congestion, ear pain, hearing loss, rhinorrhea and sore throat.    Eyes: Negative for pain,  "discharge and redness.   Respiratory: Negative for cough, shortness of breath, wheezing and stridor.    Cardiovascular: Negative for chest pain, palpitations and leg swelling.   Gastrointestinal: Negative for abdominal pain, constipation, nausea and vomiting.   Endocrine: Negative for cold intolerance, heat intolerance and polyphagia.   Genitourinary: Negative for dysuria, flank pain, frequency and urgency.   Musculoskeletal: Negative for joint swelling, myalgias, neck pain and neck stiffness.   Skin: Negative for pallor, rash and wound.   Allergic/Immunologic: Negative for environmental allergies.   Neurological: Negative for dizziness, tremors, seizures, syncope, facial asymmetry, speech difficulty, weakness, light-headedness, numbness and headaches.   Hematological: Negative for adenopathy.   Psychiatric/Behavioral: Positive for confusion. Negative for hallucinations. The patient is not nervous/anxious.        Physical Exam     Vitals:    07/10/18 1457   BP: 134/72   Pulse: 61   SpO2: 99%   Weight: 88.9 kg (196 lb)   Height: 160 cm (63\")     GENERAL: Patient is pleasant, cooperative, appears to be stated age.  Body habitus is endomorphic.  SKIN AND EXTREMITIES:  No skin rashes or lesions are noted.  No cyanosis, clubbing or edema of the extremities.    HEAD:  Head is normocephalic and atraumatic.    NECK: Neck are non-tender without thyromegaly or adenopathy.  Carotic upstrokes are 1+/4.  No cranial or cervical bruits.  The neck is supple with a full range of motion.   ENT: palate elevate symmetrically, no evidence of high arch palate, tongue midline erythema in posterior pharynx, Mallampati Classification Class III   CARDIOVASCULAR:  Regular rate and rhythm with normal S1 and S2 without rub or gallop.  RESPIRATORY:  Clear to auscultation without wheezes or crackle   ABDOMEN:  Soft and non-tender, positive bowel sound without hepatosplenomegaly  BACK:  Back is straight without midline defect.    PSYCH:  Higher " cortical function/mental status:  The patient is alert.  She is oriented x3 to time, place and person.  Recent and the remote memory appear normal.  The patient has a good fund of knowledge.  There is no visual or auditory hallucination or suicidal or homicidal ideation.  SPEECH:There is no gross evidence of aphasia, dysarthria or agnosia.      CRANIAL NERVES:  Pupils are 4mm, equal round reactive to light, reacting briskly to 2mm without afferent pupillary defect.  Visual fields are intact to confrontation testing.  Fundoscopic examination reveals sharp disk margins with normal vasculature.  No papilledema, hemorrhages or exudates.  Extraocular movements are full and smooth with normal pursuits and saccades.  No nystagmus noted.  The face is symmetric. palate elevate symmetrically, Tongue midline, positive gag reflex. The remainder of the cranial nerves are intact and symmetrical.    MOTOR: Strength is 5/5 throughout with normal tone and bulk with the following exceptions, 4/5 intrinsic muscles of the hands and feet.  No involuntary movements noted.    Deep Tendon Reflexes: are 2/4 and symmetrical in the upper extremities, 2/4 and symmetrical at the knees and 1/4 and symmetrical at the Achilles tendon.  Plantar responses were down-going bilaterally.    SENSATION:  Intact to pinprick, light touch, vibration and proprioception.  Coordination:  The patient normally performs finger-nose-finger, heel-to-knee-to-shin and rapid alternating movements in symmetrical fashion.    COORDINATION AND GAIT:  The patient walks with a narrow-based gait.  Patient is able to heel-toe and tandem walk forward and backwards without difficulty.  Romberg and monopedal  Romberg are negative.    MUSCULOSKELETAL: Range of motion normal, no clubbing, cyanosis, or edema.  No joint swelling.            Records Reviewed: I have personally reviewed her previous medical record.    Tammy was seen today for memory loss.    Diagnoses and all orders  for this visit:    Late onset Alzheimer's disease with behavioral disturbance    Other orders  -     Discontinue: donepezil (ARICEPT) 5 MG tablet; Take 1 tablet by mouth Every Night.        Treatments:  1.  Continue patient Aricept 5 mg 1 pill at bedtime  2.  Encourage patient should have regular exercise for 30 minute every day  3.  Encourage patient should participate with in the brain stimulating activity  Discussion:  I have counseled patient extensively on the pathophysiology of Alzheimer's disease. Alzheimer's disease is becoming an increasingly bigger concern over the next 50 years. The incidence of Alzheimer's is expected to quadruple, affecting one in 45 Americans.   We have also discussed several different types of medications are available to treat the memory loss, behavior changes, sleep problem of Alzheimer's disease. Three cholinesterase inhibitors are approved for Alzheimer's disease therapy: Donepezil (Aricept) is approved to treat mild, moderate, and severe Alzheimer's. Rivastigminen (Exelon) and galantamine (Razadyne) are approved to treat mild to moderate Alzheimer's.  Exelon is now also available in a skin patch, which is easier for some patients to use and is slowly released throughout the day.  Side effects of the cholinesterase inhibitors include nausea, vomiting, diarrhea, weight loss, and dizziness.  Memantine (Namenda) works by regulating the amount of glutamate in the brain. Namenda is approved for moderate to severe Alzheimer's disease. Side effects include dizziness, confusion, headache, constipation, nausea, and agitation. Because Namenda does not work the same way as a cholinesterase inhibitor, it may be used in combination with one.  Alternative therapies have been discussed including sensory therapies such as music therapy and art therapy can improve Alzheimer's patients' mood, behavior, and day-to-day function. By stimulating the senses, these therapies may help trigger memory  recall and enable Alzheimer's patients to reconnect with the world around them. Other alternative remedies, including coenzyme Q10, coral calcium , huperzine A, and omega-3 fatty acids to prevent or treat Alzheimer's disease have been mentioned during counseling. However, there is not yet enough research on these treatments to recommend using any of them as an Alzheimer's therapy.  This Document is signed by Yonatan You MD, FAAN, FAASM   July 10, 35116:46 PM

## 2018-08-13 ENCOUNTER — LAB (OUTPATIENT)
Dept: LAB | Facility: HOSPITAL | Age: 73
End: 2018-08-13

## 2018-08-13 DIAGNOSIS — I48.91 ATRIAL FIBRILLATION, UNSPECIFIED TYPE (HCC): ICD-10-CM

## 2018-08-13 LAB
INR PPP: 2.64 (ref 0.9–1.1)
PROTHROMBIN TIME: 29 SECONDS (ref 9.3–12.1)

## 2018-08-13 PROCEDURE — 36415 COLL VENOUS BLD VENIPUNCTURE: CPT

## 2018-08-13 PROCEDURE — 85610 PROTHROMBIN TIME: CPT

## 2018-10-04 ENCOUNTER — TRANSCRIBE ORDERS (OUTPATIENT)
Dept: ADMINISTRATIVE | Facility: HOSPITAL | Age: 73
End: 2018-10-04

## 2018-10-04 ENCOUNTER — LAB (OUTPATIENT)
Dept: LAB | Facility: HOSPITAL | Age: 73
End: 2018-10-04

## 2018-10-04 DIAGNOSIS — E11.9 DIABETES MELLITUS WITHOUT COMPLICATION (HCC): ICD-10-CM

## 2018-10-04 DIAGNOSIS — E78.5 HYPERLIPIDEMIA, UNSPECIFIED HYPERLIPIDEMIA TYPE: ICD-10-CM

## 2018-10-04 DIAGNOSIS — R25.2 CRAMP AND SPASM: ICD-10-CM

## 2018-10-04 DIAGNOSIS — R25.2 CRAMP AND SPASM: Primary | ICD-10-CM

## 2018-10-04 DIAGNOSIS — I48.91 ATRIAL FIBRILLATION, UNSPECIFIED TYPE (HCC): ICD-10-CM

## 2018-10-04 LAB
ALBUMIN SERPL-MCNC: 4.3 G/DL (ref 3.5–5)
ALBUMIN/GLOB SERPL: 1.7 G/DL (ref 1–2)
ALP SERPL-CCNC: 68 U/L (ref 38–126)
ALT SERPL W P-5'-P-CCNC: 27 U/L (ref 13–69)
ANION GAP SERPL CALCULATED.3IONS-SCNC: 12.1 MMOL/L (ref 10–20)
AST SERPL-CCNC: 26 U/L (ref 15–46)
BASOPHILS # BLD AUTO: 0.03 10*3/MM3 (ref 0–0.2)
BASOPHILS NFR BLD AUTO: 0.6 % (ref 0–2.5)
BILIRUB SERPL-MCNC: 0.6 MG/DL (ref 0.2–1.3)
BUN BLD-MCNC: 27 MG/DL (ref 7–20)
BUN/CREAT SERPL: 14.2 (ref 7.1–23.5)
CALCIUM SPEC-SCNC: 10.1 MG/DL (ref 8.4–10.2)
CHLORIDE SERPL-SCNC: 106 MMOL/L (ref 98–107)
CHOLEST SERPL-MCNC: 115 MG/DL (ref 0–199)
CO2 SERPL-SCNC: 25 MMOL/L (ref 26–30)
CREAT BLD-MCNC: 1.9 MG/DL (ref 0.6–1.3)
DEPRECATED RDW RBC AUTO: 48.6 FL (ref 37–54)
EOSINOPHIL # BLD AUTO: 0.04 10*3/MM3 (ref 0–0.7)
EOSINOPHIL NFR BLD AUTO: 0.7 % (ref 0–7)
ERYTHROCYTE [DISTWIDTH] IN BLOOD BY AUTOMATED COUNT: 14 % (ref 11.5–14.5)
GFR SERPL CREATININE-BSD FRML MDRD: 26 ML/MIN/1.73
GLOBULIN UR ELPH-MCNC: 2.6 GM/DL
GLUCOSE BLD-MCNC: 125 MG/DL (ref 74–98)
HBA1C MFR BLD: 6.3 % (ref 3–6)
HCT VFR BLD AUTO: 31.6 % (ref 37–47)
HDLC SERPL-MCNC: 73 MG/DL (ref 40–60)
HGB BLD-MCNC: 10.1 G/DL (ref 12–16)
IMM GRANULOCYTES # BLD: 0.01 10*3/MM3 (ref 0–0.06)
IMM GRANULOCYTES NFR BLD: 0.2 % (ref 0–0.6)
INR PPP: 3.61 (ref 0.9–1.1)
LDLC SERPL CALC-MCNC: 32 MG/DL (ref 0–99)
LDLC/HDLC SERPL: 0.44 {RATIO}
LYMPHOCYTES # BLD AUTO: 1.33 10*3/MM3 (ref 0.6–3.4)
LYMPHOCYTES NFR BLD AUTO: 24.5 % (ref 10–50)
MAGNESIUM SERPL-MCNC: 2.4 MG/DL (ref 1.6–2.3)
MCH RBC QN AUTO: 30.2 PG (ref 27–31)
MCHC RBC AUTO-ENTMCNC: 32 G/DL (ref 30–37)
MCV RBC AUTO: 94.6 FL (ref 81–99)
MONOCYTES # BLD AUTO: 0.46 10*3/MM3 (ref 0–0.9)
MONOCYTES NFR BLD AUTO: 8.5 % (ref 0–12)
NEUTROPHILS # BLD AUTO: 3.55 10*3/MM3 (ref 2–6.9)
NEUTROPHILS NFR BLD AUTO: 65.5 % (ref 37–80)
NRBC BLD MANUAL-RTO: 0 /100 WBC (ref 0–0)
PLATELET # BLD AUTO: 196 10*3/MM3 (ref 130–400)
PMV BLD AUTO: 10.4 FL (ref 6–12)
POTASSIUM BLD-SCNC: 4.1 MMOL/L (ref 3.5–5.1)
PROT SERPL-MCNC: 6.9 G/DL (ref 6.3–8.2)
PROTHROMBIN TIME: 39.4 SECONDS (ref 9.3–12.1)
RBC # BLD AUTO: 3.34 10*6/MM3 (ref 4.2–5.4)
SODIUM BLD-SCNC: 139 MMOL/L (ref 137–145)
TRIGL SERPL-MCNC: 48 MG/DL
VLDLC SERPL-MCNC: 9.6 MG/DL
WBC NRBC COR # BLD: 5.42 10*3/MM3 (ref 4.8–10.8)

## 2018-10-04 PROCEDURE — 80061 LIPID PANEL: CPT

## 2018-10-04 PROCEDURE — 83735 ASSAY OF MAGNESIUM: CPT

## 2018-10-04 PROCEDURE — 80053 COMPREHEN METABOLIC PANEL: CPT

## 2018-10-04 PROCEDURE — 85025 COMPLETE CBC W/AUTO DIFF WBC: CPT

## 2018-10-04 PROCEDURE — 83036 HEMOGLOBIN GLYCOSYLATED A1C: CPT

## 2018-10-04 PROCEDURE — 36415 COLL VENOUS BLD VENIPUNCTURE: CPT

## 2018-10-04 PROCEDURE — 85610 PROTHROMBIN TIME: CPT

## 2018-10-22 ENCOUNTER — LAB (OUTPATIENT)
Dept: LAB | Facility: HOSPITAL | Age: 73
End: 2018-10-22

## 2018-10-22 DIAGNOSIS — I48.91 ATRIAL FIBRILLATION, UNSPECIFIED TYPE (HCC): ICD-10-CM

## 2018-10-22 LAB
INR PPP: 4.23 (ref 0.9–1.1)
PROTHROMBIN TIME: 46 SECONDS (ref 9.3–12.1)

## 2018-10-22 PROCEDURE — 85610 PROTHROMBIN TIME: CPT

## 2018-10-22 PROCEDURE — 36415 COLL VENOUS BLD VENIPUNCTURE: CPT

## 2018-11-05 ENCOUNTER — LAB (OUTPATIENT)
Dept: LAB | Facility: HOSPITAL | Age: 73
End: 2018-11-05

## 2018-11-05 DIAGNOSIS — I48.91 ATRIAL FIBRILLATION, UNSPECIFIED TYPE (HCC): ICD-10-CM

## 2018-11-05 LAB
INR PPP: 6.32 (ref 0.9–1.1)
PROTHROMBIN TIME: 68.1 SECONDS (ref 9.3–12.1)

## 2018-11-05 PROCEDURE — 85610 PROTHROMBIN TIME: CPT

## 2018-11-05 PROCEDURE — 36415 COLL VENOUS BLD VENIPUNCTURE: CPT

## 2018-11-12 ENCOUNTER — LAB (OUTPATIENT)
Dept: LAB | Facility: HOSPITAL | Age: 73
End: 2018-11-12

## 2018-11-12 DIAGNOSIS — I48.91 ATRIAL FIBRILLATION, UNSPECIFIED TYPE (HCC): ICD-10-CM

## 2018-11-12 LAB
INR PPP: 3.07 (ref 0.9–1.1)
PROTHROMBIN TIME: 33.6 SECONDS (ref 9.3–12.1)

## 2018-11-12 PROCEDURE — 85610 PROTHROMBIN TIME: CPT

## 2018-11-12 PROCEDURE — 36415 COLL VENOUS BLD VENIPUNCTURE: CPT

## 2018-12-03 ENCOUNTER — LAB (OUTPATIENT)
Dept: LAB | Facility: HOSPITAL | Age: 73
End: 2018-12-03

## 2018-12-03 DIAGNOSIS — I48.91 ATRIAL FIBRILLATION, UNSPECIFIED TYPE (HCC): ICD-10-CM

## 2018-12-03 LAB
INR PPP: 2.5 (ref 0.9–1.1)
PROTHROMBIN TIME: 27.4 SECONDS (ref 9.3–12.1)

## 2018-12-03 PROCEDURE — 85610 PROTHROMBIN TIME: CPT

## 2018-12-03 PROCEDURE — 36415 COLL VENOUS BLD VENIPUNCTURE: CPT

## 2018-12-06 NOTE — TELEPHONE ENCOUNTER
Patient called concerning her Aricept 5MG , she wants to see if this could be increased. She feels this medication is not working for her at this dosage.    Please Advise and let patient know.

## 2018-12-10 RX ORDER — DONEPEZIL HYDROCHLORIDE 10 MG/1
10 TABLET, FILM COATED ORAL NIGHTLY
Qty: 30 TABLET | Refills: 2 | Status: SHIPPED | OUTPATIENT
Start: 2018-12-10 | End: 2018-12-30 | Stop reason: SDUPTHER

## 2018-12-11 ENCOUNTER — LAB (OUTPATIENT)
Dept: LAB | Facility: HOSPITAL | Age: 73
End: 2018-12-11

## 2018-12-11 ENCOUNTER — TRANSCRIBE ORDERS (OUTPATIENT)
Dept: LAB | Facility: HOSPITAL | Age: 73
End: 2018-12-11

## 2018-12-11 DIAGNOSIS — N17.9 ACUTE RENAL FAILURE, UNSPECIFIED ACUTE RENAL FAILURE TYPE (HCC): Primary | ICD-10-CM

## 2018-12-11 DIAGNOSIS — N17.9 ACUTE RENAL FAILURE, UNSPECIFIED ACUTE RENAL FAILURE TYPE (HCC): ICD-10-CM

## 2018-12-11 LAB
ANION GAP SERPL CALCULATED.3IONS-SCNC: 10.4 MMOL/L (ref 10–20)
BUN BLD-MCNC: 39 MG/DL (ref 7–20)
BUN/CREAT SERPL: 17 (ref 7.1–23.5)
CALCIUM SPEC-SCNC: 10.2 MG/DL (ref 8.4–10.2)
CHLORIDE SERPL-SCNC: 106 MMOL/L (ref 98–107)
CO2 SERPL-SCNC: 27 MMOL/L (ref 26–30)
CREAT BLD-MCNC: 2.3 MG/DL (ref 0.6–1.3)
GFR SERPL CREATININE-BSD FRML MDRD: 21 ML/MIN/1.73
GLUCOSE BLD-MCNC: 115 MG/DL (ref 74–98)
INR PPP: 2.42 (ref 0.9–1.1)
POTASSIUM BLD-SCNC: 4.4 MMOL/L (ref 3.5–5.1)
PROTHROMBIN TIME: 26.6 SECONDS (ref 9.3–12.1)
SODIUM BLD-SCNC: 139 MMOL/L (ref 137–145)

## 2018-12-11 PROCEDURE — 85610 PROTHROMBIN TIME: CPT

## 2018-12-11 PROCEDURE — 36415 COLL VENOUS BLD VENIPUNCTURE: CPT

## 2018-12-11 PROCEDURE — 80048 BASIC METABOLIC PNL TOTAL CA: CPT

## 2018-12-17 ENCOUNTER — LAB (OUTPATIENT)
Dept: LAB | Facility: HOSPITAL | Age: 73
End: 2018-12-17

## 2018-12-17 DIAGNOSIS — I48.91 ATRIAL FIBRILLATION, UNSPECIFIED TYPE (HCC): ICD-10-CM

## 2018-12-17 LAB
INR PPP: 1.35 (ref 0.9–1.1)
PROTHROMBIN TIME: 15 SECONDS (ref 9.3–12.1)

## 2018-12-17 PROCEDURE — 36415 COLL VENOUS BLD VENIPUNCTURE: CPT

## 2018-12-17 PROCEDURE — 85610 PROTHROMBIN TIME: CPT

## 2019-01-02 RX ORDER — DONEPEZIL HYDROCHLORIDE 10 MG/1
10 TABLET, FILM COATED ORAL NIGHTLY
Qty: 90 TABLET | Refills: 1 | Status: SHIPPED | OUTPATIENT
Start: 2019-01-02 | End: 2019-07-21 | Stop reason: SDUPTHER

## 2019-01-16 ENCOUNTER — LAB (OUTPATIENT)
Dept: LAB | Facility: HOSPITAL | Age: 74
End: 2019-01-16

## 2019-01-16 DIAGNOSIS — I48.91 ATRIAL FIBRILLATION, UNSPECIFIED TYPE (HCC): ICD-10-CM

## 2019-01-16 LAB
INR PPP: 1.18 (ref 0.9–1.1)
PROTHROMBIN TIME: 13.1 SECONDS (ref 9.3–12.1)

## 2019-01-16 PROCEDURE — 85610 PROTHROMBIN TIME: CPT

## 2019-01-16 PROCEDURE — 36415 COLL VENOUS BLD VENIPUNCTURE: CPT

## 2019-02-01 ENCOUNTER — TRANSCRIBE ORDERS (OUTPATIENT)
Dept: ADMINISTRATIVE | Facility: HOSPITAL | Age: 74
End: 2019-02-01

## 2019-02-01 ENCOUNTER — LAB (OUTPATIENT)
Dept: LAB | Facility: HOSPITAL | Age: 74
End: 2019-02-01

## 2019-02-01 DIAGNOSIS — N18.9 CHRONIC KIDNEY DISEASE, UNSPECIFIED CKD STAGE: Primary | ICD-10-CM

## 2019-02-01 DIAGNOSIS — N18.9 CHRONIC KIDNEY DISEASE, UNSPECIFIED CKD STAGE: ICD-10-CM

## 2019-02-01 LAB
ALBUMIN SERPL-MCNC: 4.1 G/DL (ref 3.5–5)
ANION GAP SERPL CALCULATED.3IONS-SCNC: 12.8 MMOL/L (ref 10–20)
BASOPHILS # BLD AUTO: 0.03 10*3/MM3 (ref 0–0.2)
BASOPHILS NFR BLD AUTO: 0.5 % (ref 0–2.5)
BILIRUB UR QL STRIP: NEGATIVE
BUN BLD-MCNC: 22 MG/DL (ref 7–20)
BUN/CREAT SERPL: 12.2 (ref 7.1–23.5)
CALCIUM SPEC-SCNC: 9.9 MG/DL (ref 8.4–10.2)
CHLORIDE SERPL-SCNC: 95 MMOL/L (ref 98–107)
CLARITY UR: CLEAR
CO2 SERPL-SCNC: 28 MMOL/L (ref 26–30)
COLOR UR: YELLOW
CREAT BLD-MCNC: 1.8 MG/DL (ref 0.6–1.3)
DEPRECATED RDW RBC AUTO: 46.6 FL (ref 37–54)
EOSINOPHIL # BLD AUTO: 0.03 10*3/MM3 (ref 0–0.7)
EOSINOPHIL NFR BLD AUTO: 0.5 % (ref 0–7)
ERYTHROCYTE [DISTWIDTH] IN BLOOD BY AUTOMATED COUNT: 13.5 % (ref 11.5–14.5)
GFR SERPL CREATININE-BSD FRML MDRD: 28 ML/MIN/1.73
GLUCOSE BLD-MCNC: 171 MG/DL (ref 74–98)
GLUCOSE UR STRIP-MCNC: NEGATIVE MG/DL
HCT VFR BLD AUTO: 31.4 % (ref 37–47)
HGB BLD-MCNC: 10.5 G/DL (ref 12–16)
HGB UR QL STRIP.AUTO: NEGATIVE
IMM GRANULOCYTES # BLD AUTO: 0.05 10*3/MM3 (ref 0–0.06)
IMM GRANULOCYTES NFR BLD AUTO: 0.8 % (ref 0–0.6)
KETONES UR QL STRIP: ABNORMAL
LEUKOCYTE ESTERASE UR QL STRIP.AUTO: NEGATIVE
LYMPHOCYTES # BLD AUTO: 0.8 10*3/MM3 (ref 0.6–3.4)
LYMPHOCYTES NFR BLD AUTO: 12.2 % (ref 10–50)
MCH RBC QN AUTO: 31.3 PG (ref 27–31)
MCHC RBC AUTO-ENTMCNC: 33.4 G/DL (ref 30–37)
MCV RBC AUTO: 93.5 FL (ref 81–99)
MONOCYTES # BLD AUTO: 0.46 10*3/MM3 (ref 0–0.9)
MONOCYTES NFR BLD AUTO: 7 % (ref 0–12)
NEUTROPHILS # BLD AUTO: 5.2 10*3/MM3 (ref 2–6.9)
NEUTROPHILS NFR BLD AUTO: 79 % (ref 37–80)
NITRITE UR QL STRIP: NEGATIVE
NRBC BLD AUTO-RTO: 0 /100 WBC (ref 0–0)
PH UR STRIP.AUTO: 7.5 [PH] (ref 5–8)
PHOSPHATE SERPL-MCNC: 3.5 MG/DL (ref 2.5–4.5)
PLATELET # BLD AUTO: 241 10*3/MM3 (ref 130–400)
PMV BLD AUTO: 10.3 FL (ref 6–12)
POTASSIUM BLD-SCNC: 3.8 MMOL/L (ref 3.5–5.1)
PROT UR QL STRIP: NEGATIVE
RBC # BLD AUTO: 3.36 10*6/MM3 (ref 4.2–5.4)
SODIUM BLD-SCNC: 132 MMOL/L (ref 137–145)
SP GR UR STRIP: 1.01 (ref 1–1.03)
UROBILINOGEN UR QL STRIP: ABNORMAL
WBC NRBC COR # BLD: 6.57 10*3/MM3 (ref 4.8–10.8)

## 2019-02-01 PROCEDURE — 81003 URINALYSIS AUTO W/O SCOPE: CPT

## 2019-02-01 PROCEDURE — 80069 RENAL FUNCTION PANEL: CPT | Performed by: INTERNAL MEDICINE

## 2019-02-01 PROCEDURE — 36415 COLL VENOUS BLD VENIPUNCTURE: CPT | Performed by: INTERNAL MEDICINE

## 2019-02-01 PROCEDURE — 85025 COMPLETE CBC W/AUTO DIFF WBC: CPT | Performed by: INTERNAL MEDICINE

## 2019-02-19 ENCOUNTER — LAB (OUTPATIENT)
Dept: LAB | Facility: HOSPITAL | Age: 74
End: 2019-02-19

## 2019-02-19 ENCOUNTER — TRANSCRIBE ORDERS (OUTPATIENT)
Dept: ADMINISTRATIVE | Facility: HOSPITAL | Age: 74
End: 2019-02-19

## 2019-02-19 ENCOUNTER — APPOINTMENT (OUTPATIENT)
Dept: LAB | Facility: HOSPITAL | Age: 74
End: 2019-02-19

## 2019-02-19 DIAGNOSIS — I48.91 ATRIAL FIBRILLATION, UNSPECIFIED TYPE (HCC): ICD-10-CM

## 2019-02-19 DIAGNOSIS — I10 HYPERTENSION, UNSPECIFIED TYPE: Primary | ICD-10-CM

## 2019-02-19 LAB
ANION GAP SERPL CALCULATED.3IONS-SCNC: 11 MMOL/L (ref 10–20)
BUN BLD-MCNC: 28 MG/DL (ref 7–20)
BUN/CREAT SERPL: 14.7 (ref 7.1–23.5)
CALCIUM SPEC-SCNC: 9.9 MG/DL (ref 8.4–10.2)
CHLORIDE SERPL-SCNC: 105 MMOL/L (ref 98–107)
CO2 SERPL-SCNC: 26 MMOL/L (ref 26–30)
CREAT BLD-MCNC: 1.9 MG/DL (ref 0.6–1.3)
GFR SERPL CREATININE-BSD FRML MDRD: 26 ML/MIN/1.73
GLUCOSE BLD-MCNC: 109 MG/DL (ref 74–98)
INR PPP: 1.98 (ref 0.9–1.1)
POTASSIUM BLD-SCNC: 4 MMOL/L (ref 3.5–5.1)
PROTHROMBIN TIME: 21.8 SECONDS (ref 9.3–12.1)
SODIUM BLD-SCNC: 138 MMOL/L (ref 137–145)

## 2019-02-19 PROCEDURE — 85610 PROTHROMBIN TIME: CPT

## 2019-02-19 PROCEDURE — 80048 BASIC METABOLIC PNL TOTAL CA: CPT | Performed by: INTERNAL MEDICINE

## 2019-02-19 PROCEDURE — 36415 COLL VENOUS BLD VENIPUNCTURE: CPT | Performed by: INTERNAL MEDICINE

## 2019-03-19 ENCOUNTER — LAB (OUTPATIENT)
Dept: LAB | Facility: HOSPITAL | Age: 74
End: 2019-03-19

## 2019-03-19 DIAGNOSIS — I48.91 ATRIAL FIBRILLATION, UNSPECIFIED TYPE (HCC): ICD-10-CM

## 2019-03-19 LAB
INR PPP: 1.8 (ref 0.9–1.1)
PROTHROMBIN TIME: 21.3 SECONDS (ref 12–15.1)

## 2019-03-19 PROCEDURE — 36415 COLL VENOUS BLD VENIPUNCTURE: CPT

## 2019-03-19 PROCEDURE — 85610 PROTHROMBIN TIME: CPT

## 2019-04-07 ENCOUNTER — HOSPITAL ENCOUNTER (OUTPATIENT)
Facility: HOSPITAL | Age: 74
Setting detail: OBSERVATION
Discharge: HOME | End: 2019-04-08
Attending: EMERGENCY MEDICINE | Admitting: INTERNAL MEDICINE

## 2019-04-07 ENCOUNTER — APPOINTMENT (OUTPATIENT)
Dept: GENERAL RADIOLOGY | Facility: HOSPITAL | Age: 74
End: 2019-04-07

## 2019-04-07 DIAGNOSIS — J81.0 FLASH PULMONARY EDEMA (HCC): Primary | ICD-10-CM

## 2019-04-07 DIAGNOSIS — Z74.09 IMPAIRED FUNCTIONAL MOBILITY, BALANCE, GAIT, AND ENDURANCE: ICD-10-CM

## 2019-04-07 DIAGNOSIS — Z78.9 IMPAIRED MOBILITY AND ADLS: ICD-10-CM

## 2019-04-07 DIAGNOSIS — R06.02 SOB (SHORTNESS OF BREATH): ICD-10-CM

## 2019-04-07 DIAGNOSIS — Z74.09 IMPAIRED MOBILITY AND ADLS: ICD-10-CM

## 2019-04-07 PROBLEM — E03.9 HYPOTHYROIDISM (ACQUIRED): Status: ACTIVE | Noted: 2019-04-07

## 2019-04-07 PROBLEM — G47.33 OBSTRUCTIVE SLEEP APNEA: Status: ACTIVE | Noted: 2019-04-07

## 2019-04-07 PROBLEM — I16.0 HYPERTENSIVE URGENCY: Status: ACTIVE | Noted: 2019-04-07

## 2019-04-07 LAB
ALBUMIN SERPL-MCNC: 3.9 G/DL (ref 3.5–5)
ALBUMIN/GLOB SERPL: 1.7 G/DL (ref 1–2)
ALP SERPL-CCNC: 65 U/L (ref 38–126)
ALT SERPL W P-5'-P-CCNC: 31 U/L (ref 13–69)
ANION GAP SERPL CALCULATED.3IONS-SCNC: 13.3 MMOL/L (ref 10–20)
AST SERPL-CCNC: 23 U/L (ref 15–46)
ATMOSPHERIC PRESS: 735 MMHG
BASE EXCESS BLDV CALC-SCNC: -3 MMOL/L (ref 0–2)
BASOPHILS # BLD AUTO: 0.04 10*3/MM3 (ref 0–0.2)
BASOPHILS NFR BLD AUTO: 0.4 % (ref 0–1.5)
BDY SITE: ABNORMAL
BILIRUB SERPL-MCNC: 0.5 MG/DL (ref 0.2–1.3)
BUN BLD-MCNC: 30 MG/DL (ref 7–20)
BUN/CREAT SERPL: 16.7 (ref 7.1–23.5)
CALCIUM SPEC-SCNC: 10 MG/DL (ref 8.4–10.2)
CHLORIDE SERPL-SCNC: 107 MMOL/L (ref 98–107)
CO2 SERPL-SCNC: 24 MMOL/L (ref 26–30)
CREAT BLD-MCNC: 1.8 MG/DL (ref 0.6–1.3)
DEPRECATED RDW RBC AUTO: 52.7 FL (ref 37–54)
EOSINOPHIL # BLD AUTO: 0.05 10*3/MM3 (ref 0–0.4)
EOSINOPHIL NFR BLD AUTO: 0.5 % (ref 0.3–6.2)
ERYTHROCYTE [DISTWIDTH] IN BLOOD BY AUTOMATED COUNT: 14.1 % (ref 12.3–15.4)
GFR SERPL CREATININE-BSD FRML MDRD: 28 ML/MIN/1.73
GLOBULIN UR ELPH-MCNC: 2.3 GM/DL
GLUCOSE BLD-MCNC: 173 MG/DL (ref 74–98)
HCO3 BLDV-SCNC: 23.4 MMOL/L (ref 22–28)
HCT VFR BLD AUTO: 31.2 % (ref 34–46.6)
HGB BLD-MCNC: 9.9 G/DL (ref 12–15.9)
HOLD SPECIMEN: NORMAL
HOLD SPECIMEN: NORMAL
HOROWITZ INDEX BLD+IHG-RTO: 50 %
IMM GRANULOCYTES # BLD AUTO: 0.03 10*3/MM3 (ref 0–0.05)
IMM GRANULOCYTES NFR BLD AUTO: 0.3 % (ref 0–0.5)
INR PPP: 1.48 (ref 0.9–1.1)
LYMPHOCYTES # BLD AUTO: 0.93 10*3/MM3 (ref 0.7–3.1)
LYMPHOCYTES NFR BLD AUTO: 9.6 % (ref 19.6–45.3)
MCH RBC QN AUTO: 31.8 PG (ref 26.6–33)
MCHC RBC AUTO-ENTMCNC: 31.7 G/DL (ref 31.5–35.7)
MCV RBC AUTO: 100.3 FL (ref 79–97)
MODALITY: ABNORMAL
MONOCYTES # BLD AUTO: 0.52 10*3/MM3 (ref 0.1–0.9)
MONOCYTES NFR BLD AUTO: 5.4 % (ref 5–12)
NEUTROPHILS # BLD AUTO: 8.13 10*3/MM3 (ref 1.4–7)
NEUTROPHILS NFR BLD AUTO: 83.8 % (ref 42.7–76)
NRBC BLD AUTO-RTO: 0 /100 WBC (ref 0–0)
NT-PROBNP SERPL-MCNC: 4120 PG/ML (ref 0–125)
PCO2 BLDV: 46.9 MM HG (ref 40–50)
PH BLDV: 7.31 PH UNITS (ref 7.32–7.42)
PLATELET # BLD AUTO: 214 10*3/MM3 (ref 140–450)
PMV BLD AUTO: 10.6 FL (ref 6–12)
PO2 BLDV: 66.3 MM HG (ref 30–50)
POTASSIUM BLD-SCNC: 4.3 MMOL/L (ref 3.5–5.1)
PROT SERPL-MCNC: 6.2 G/DL (ref 6.3–8.2)
PROTHROMBIN TIME: 18.3 SECONDS (ref 12–15.1)
RBC # BLD AUTO: 3.11 10*6/MM3 (ref 3.77–5.28)
SAO2 % BLDCOV: 90.2 % (ref 45–75)
SODIUM BLD-SCNC: 140 MMOL/L (ref 137–145)
TROPONIN I SERPL-MCNC: <0.012 NG/ML (ref 0–0.03)
VENTILATOR MODE: ABNORMAL
WBC NRBC COR # BLD: 9.7 10*3/MM3 (ref 3.4–10.8)
WHOLE BLOOD HOLD SPECIMEN: NORMAL
WHOLE BLOOD HOLD SPECIMEN: NORMAL

## 2019-04-07 PROCEDURE — 99285 EMERGENCY DEPT VISIT HI MDM: CPT

## 2019-04-07 PROCEDURE — G0378 HOSPITAL OBSERVATION PER HR: HCPCS

## 2019-04-07 PROCEDURE — 93005 ELECTROCARDIOGRAM TRACING: CPT | Performed by: EMERGENCY MEDICINE

## 2019-04-07 PROCEDURE — 84484 ASSAY OF TROPONIN QUANT: CPT | Performed by: EMERGENCY MEDICINE

## 2019-04-07 PROCEDURE — 80053 COMPREHEN METABOLIC PANEL: CPT | Performed by: EMERGENCY MEDICINE

## 2019-04-07 PROCEDURE — 94799 UNLISTED PULMONARY SVC/PX: CPT

## 2019-04-07 PROCEDURE — 85610 PROTHROMBIN TIME: CPT | Performed by: EMERGENCY MEDICINE

## 2019-04-07 PROCEDURE — 83880 ASSAY OF NATRIURETIC PEPTIDE: CPT | Performed by: EMERGENCY MEDICINE

## 2019-04-07 PROCEDURE — 85025 COMPLETE CBC W/AUTO DIFF WBC: CPT | Performed by: EMERGENCY MEDICINE

## 2019-04-07 PROCEDURE — 71045 X-RAY EXAM CHEST 1 VIEW: CPT

## 2019-04-07 PROCEDURE — 96374 THER/PROPH/DIAG INJ IV PUSH: CPT

## 2019-04-07 PROCEDURE — 25010000002 FUROSEMIDE PER 20 MG: Performed by: EMERGENCY MEDICINE

## 2019-04-07 PROCEDURE — 82805 BLOOD GASES W/O2 SATURATION: CPT

## 2019-04-07 PROCEDURE — 99219 PR INITIAL OBSERVATION CARE/DAY 50 MINUTES: CPT | Performed by: INTERNAL MEDICINE

## 2019-04-07 PROCEDURE — 94660 CPAP INITIATION&MGMT: CPT

## 2019-04-07 RX ORDER — CARVEDILOL 25 MG/1
25 TABLET ORAL EVERY 12 HOURS
Status: DISCONTINUED | OUTPATIENT
Start: 2019-04-07 | End: 2019-04-08 | Stop reason: HOSPADM

## 2019-04-07 RX ORDER — WARFARIN SODIUM 1 MG/1
3.5 TABLET ORAL
COMMUNITY
End: 2019-04-08 | Stop reason: HOSPADM

## 2019-04-07 RX ORDER — HYDRALAZINE HYDROCHLORIDE 25 MG/1
25 TABLET, FILM COATED ORAL 2 TIMES DAILY
COMMUNITY
End: 2019-06-03 | Stop reason: HOSPADM

## 2019-04-07 RX ORDER — FERROUS SULFATE TAB EC 324 MG (65 MG FE EQUIVALENT) 324 (65 FE) MG
324 TABLET DELAYED RESPONSE ORAL
Status: DISCONTINUED | OUTPATIENT
Start: 2019-04-08 | End: 2019-04-08 | Stop reason: HOSPADM

## 2019-04-07 RX ORDER — LOSARTAN POTASSIUM 50 MG/1
50 TABLET ORAL DAILY
Status: DISCONTINUED | OUTPATIENT
Start: 2019-04-07 | End: 2019-04-08 | Stop reason: HOSPADM

## 2019-04-07 RX ORDER — HYDRALAZINE HYDROCHLORIDE 25 MG/1
25 TABLET, FILM COATED ORAL 2 TIMES DAILY
Status: DISCONTINUED | OUTPATIENT
Start: 2019-04-07 | End: 2019-04-08 | Stop reason: HOSPADM

## 2019-04-07 RX ORDER — ONDANSETRON 2 MG/ML
4 INJECTION INTRAMUSCULAR; INTRAVENOUS EVERY 6 HOURS PRN
Status: DISCONTINUED | OUTPATIENT
Start: 2019-04-07 | End: 2019-04-08 | Stop reason: HOSPADM

## 2019-04-07 RX ORDER — DONEPEZIL HYDROCHLORIDE 5 MG/1
10 TABLET, FILM COATED ORAL NIGHTLY
Status: DISCONTINUED | OUTPATIENT
Start: 2019-04-07 | End: 2019-04-08 | Stop reason: HOSPADM

## 2019-04-07 RX ORDER — ACETAMINOPHEN 325 MG/1
650 TABLET ORAL EVERY 6 HOURS PRN
Status: DISCONTINUED | OUTPATIENT
Start: 2019-04-07 | End: 2019-04-08 | Stop reason: HOSPADM

## 2019-04-07 RX ORDER — WARFARIN SODIUM 2 MG/1
4 TABLET ORAL
Status: DISCONTINUED | OUTPATIENT
Start: 2019-04-07 | End: 2019-04-08 | Stop reason: HOSPADM

## 2019-04-07 RX ORDER — HYDROCHLOROTHIAZIDE 25 MG/1
25 TABLET ORAL DAILY
Status: ON HOLD | COMMUNITY
End: 2019-05-27 | Stop reason: SDUPTHER

## 2019-04-07 RX ORDER — CHOLECALCIFEROL (VITAMIN D3) 125 MCG
5 CAPSULE ORAL NIGHTLY PRN
Status: DISCONTINUED | OUTPATIENT
Start: 2019-04-07 | End: 2019-04-08 | Stop reason: HOSPADM

## 2019-04-07 RX ORDER — SODIUM CHLORIDE 0.9 % (FLUSH) 0.9 %
10 SYRINGE (ML) INJECTION AS NEEDED
Status: DISCONTINUED | OUTPATIENT
Start: 2019-04-07 | End: 2019-04-08 | Stop reason: HOSPADM

## 2019-04-07 RX ORDER — ONDANSETRON 4 MG/1
4 TABLET, FILM COATED ORAL EVERY 6 HOURS PRN
Status: DISCONTINUED | OUTPATIENT
Start: 2019-04-07 | End: 2019-04-08 | Stop reason: HOSPADM

## 2019-04-07 RX ORDER — SODIUM CHLORIDE 0.9 % (FLUSH) 0.9 %
3 SYRINGE (ML) INJECTION EVERY 12 HOURS SCHEDULED
Status: DISCONTINUED | OUTPATIENT
Start: 2019-04-07 | End: 2019-04-08 | Stop reason: HOSPADM

## 2019-04-07 RX ORDER — FUROSEMIDE 10 MG/ML
40 INJECTION INTRAMUSCULAR; INTRAVENOUS DAILY
Status: DISCONTINUED | OUTPATIENT
Start: 2019-04-08 | End: 2019-04-08 | Stop reason: HOSPADM

## 2019-04-07 RX ORDER — SPIRONOLACTONE 25 MG/1
25 TABLET ORAL DAILY
Status: DISCONTINUED | OUTPATIENT
Start: 2019-04-07 | End: 2019-04-08 | Stop reason: HOSPADM

## 2019-04-07 RX ORDER — WARFARIN SODIUM 3 MG/1
3 TABLET ORAL
COMMUNITY
End: 2019-04-08 | Stop reason: HOSPADM

## 2019-04-07 RX ORDER — ATORVASTATIN CALCIUM 40 MG/1
40 TABLET, FILM COATED ORAL NIGHTLY
Status: DISCONTINUED | OUTPATIENT
Start: 2019-04-07 | End: 2019-04-08 | Stop reason: HOSPADM

## 2019-04-07 RX ORDER — ONDANSETRON 4 MG/1
4 TABLET, ORALLY DISINTEGRATING ORAL EVERY 6 HOURS PRN
Status: DISCONTINUED | OUTPATIENT
Start: 2019-04-07 | End: 2019-04-08 | Stop reason: HOSPADM

## 2019-04-07 RX ORDER — FUROSEMIDE 10 MG/ML
20 INJECTION INTRAMUSCULAR; INTRAVENOUS ONCE
Status: COMPLETED | OUTPATIENT
Start: 2019-04-07 | End: 2019-04-07

## 2019-04-07 RX ORDER — ASPIRIN 81 MG/1
81 TABLET ORAL DAILY
Status: DISCONTINUED | OUTPATIENT
Start: 2019-04-07 | End: 2019-04-08 | Stop reason: HOSPADM

## 2019-04-07 RX ORDER — GABAPENTIN 300 MG/1
600 CAPSULE ORAL EVERY 12 HOURS SCHEDULED
Status: DISCONTINUED | OUTPATIENT
Start: 2019-04-07 | End: 2019-04-08 | Stop reason: HOSPADM

## 2019-04-07 RX ORDER — SODIUM CHLORIDE 0.9 % (FLUSH) 0.9 %
3-10 SYRINGE (ML) INJECTION AS NEEDED
Status: DISCONTINUED | OUTPATIENT
Start: 2019-04-07 | End: 2019-04-08 | Stop reason: HOSPADM

## 2019-04-07 RX ADMIN — ATORVASTATIN CALCIUM 40 MG: 40 TABLET, FILM COATED ORAL at 21:18

## 2019-04-07 RX ADMIN — GABAPENTIN 600 MG: 300 CAPSULE ORAL at 21:18

## 2019-04-07 RX ADMIN — WARFARIN SODIUM 4 MG: 2 TABLET ORAL at 17:25

## 2019-04-07 RX ADMIN — DONEPEZIL HYDROCHLORIDE 10 MG: 5 TABLET ORAL at 21:19

## 2019-04-07 RX ADMIN — SODIUM CHLORIDE, PRESERVATIVE FREE 3 ML: 5 INJECTION INTRAVENOUS at 21:20

## 2019-04-07 RX ADMIN — HYDRALAZINE HYDROCHLORIDE 25 MG: 25 TABLET, FILM COATED ORAL at 21:19

## 2019-04-07 RX ADMIN — NITROGLYCERIN 1 INCH: 20 OINTMENT TOPICAL at 10:10

## 2019-04-07 RX ADMIN — FUROSEMIDE 20 MG: 10 INJECTION, SOLUTION INTRAMUSCULAR; INTRAVENOUS at 10:36

## 2019-04-07 NOTE — PLAN OF CARE
Problem: Patient Care Overview  Goal: Plan of Care Review  Outcome: Ongoing (interventions implemented as appropriate)   04/07/19 1227   Coping/Psychosocial   Plan of Care Reviewed With patient   Plan of Care Review   Progress no change   OTHER   Outcome Summary Patient admitted

## 2019-04-07 NOTE — ED PROVIDER NOTES
Subjective   History of Present Illness   73-year-old female with a history of sleep apnea, congestive heart failure, diabetes, atrial fibrillation on Coumadin brought in by EMS for shortness of breath.  Apparently, the patient was recently placed on a diuretic for her bilateral lower extremity swelling.  However, last night she had a visitor and it did not wear her BiPAP overnight.  She felt more short of breath this morning that was not relieved by a DuoNeb prior to arrival.  She was also noted to be hypertensive with a systolic in the 200s prior to arrival.  Placed on CPAP by EMS and came in without in place.  She was able to answer questions appropriately though she does have a history of Alzheimer's with some dementia.  History obtained from her, EMS, and a friend who accompanied her.    Review of Systems   Respiratory: Positive for shortness of breath.    All other systems reviewed and are negative.      Past Medical History:   Diagnosis Date   • Anemia    • Arthritis    • Cancer (CMS/HCC)     breast   • Congestive heart failure (CMS/HCC)    • Diabetes mellitus (CMS/HCC)     DIET CONTROLLED   • Disease of thyroid gland    • Hearing loss    • Heart disease    • Hyperlipidemia    • Hypertension    • Kidney disease    • Sleep apnea        Allergies   Allergen Reactions   • Cephalosporins Anaphylaxis   • Quinolones Anaphylaxis   • Ciprofloxacin    • Keflex [Cephalexin] Hives       Past Surgical History:   Procedure Laterality Date   • BACK SURGERY  2015   • BREAST BIOPSY  1998   • BREAST EXCISIONAL BIOPSY Left 02/08/1999   • BREAST RECONSTRUCTION Bilateral    • CAROTID ARTERY ANGIOPLASTY Right 2008    ([facility])   • CATARACT EXTRACTION Bilateral    • COLONOSCOPY N/A 8/17/2017    Procedure: COLONOSCOPY;  Surgeon: Sindy Clement MD;  Location: [facility] ENDOSCOPY;  Service:    • HYSTERECTOMY     • MASTECTOMY Bilateral 2010       Family History   Problem Relation Age of Onset   • Cancer Mother          breast   • Diabetes Mother    • Heart disease Father    • Stroke Father    • Hypertension Father    • Breast cancer Maternal Grandmother    • Breast cancer Paternal Grandmother        Social History     Socioeconomic History   • Marital status: Single     Spouse name: Not on file   • Number of children: Not on file   • Years of education: Not on file   • Highest education level: Not on file   Tobacco Use   • Smoking status: Former Smoker     Last attempt to quit: 6/20/2003     Years since quitting: 15.8   • Smokeless tobacco: Never Used   Substance and Sexual Activity   • Alcohol use: Yes   • Drug use: No           Objective   Physical Exam   Constitutional: She is oriented to person, place, and time. She appears well-developed and well-nourished.  Non-toxic appearance. She does not appear ill. No distress.   HENT:   Head: Normocephalic.   Mouth/Throat: Oropharynx is clear and moist. No oropharyngeal exudate.   Eyes: Conjunctivae are normal. No scleral icterus.   Neck: Neck supple. No tracheal deviation present.   Cardiovascular: Normal rate, regular rhythm, normal heart sounds and intact distal pulses. Exam reveals no gallop and no friction rub.   No murmur heard.  Pulmonary/Chest: Effort normal. No stridor. No respiratory distress. She has wheezes (Faint expiratory). She has no rales. She exhibits no tenderness.   Abdominal: Soft. She exhibits no distension and no mass. There is no tenderness. There is no rebound and no guarding. No hernia.   Musculoskeletal: She exhibits no edema or deformity.   Neurological: She is alert and oriented to person, place, and time.   Skin: Skin is warm and dry. She is not diaphoretic. No erythema. No pallor.   Psychiatric: She has a normal mood and affect. Her behavior is normal.   Nursing note and vitals reviewed.      Critical Care  Performed by: Chavo Matos MD  Authorized by: Chavo Matos MD     Critical care provider statement:     Critical care time (minutes):   35    Critical care was necessary to treat or prevent imminent or life-threatening deterioration of the following conditions:  Respiratory failure    Critical care was time spent personally by me on the following activities:  Evaluation of patient's response to treatment, discussions with consultants, development of treatment plan with patient or surrogate, obtaining history from patient or surrogate, re-evaluation of patient's condition, ordering and review of radiographic studies, ordering and review of laboratory studies, ordering and performing treatments and interventions and pulse oximetry               ED Course  ED Course as of Apr 07 1040   Sun Apr 07, 2019   1017 EKG: Normal sinus rhythm with a rate of 66, normal intervals, no ST elevation or depression.  Mild inversion of the T wave in lead III.  Abnormal EKG.  [AI]      ED Course User Index  [AI] Chavo Matos MD                  [facility]  73-year-old female here with shortness of breath in setting of not using her BiPAP overnight.  Afebrile, vital signs are normal for hypertension.  She is on BiPAP now with sats that are in the high to mid 90s.  Suspect some CO2 narcosis and likely flash pulmonary edema given her elevated blood pressures and no heart failure.  We will send labs, chest x-ray, maintain BiPAP, placed Nitropaste and reassess.    10:43 AM labs show a slight acidosis with a PCO2 is reassuring.  She also has an elevated BNP and a chest x-ray that appears to show fluid overload.  I suspect she likely had some CO2 narcosis that is now improving with CPAP prior to arrival on BiPAP here.  Furthermore, I suspect she had flash pulmonary edema that is improving now that her blood pressure is in the 130s over 60s with Nitropaste.  Discussed with the hospitalist and will admit for further management.    Final diagnoses:   Flash pulmonary edema (CMS/[facility])   SOB (shortness of breath)            Chavo Matos MD  04/07/19 1045

## 2019-04-07 NOTE — H&P
[facility] Medicine Services  HISTORY AND PHYSICAL    Primary Care Physician: Sravani Grijalva MD    Subjective     Chief Complaint:    Chief Complaint   Patient presents with   • Shortness of Breath       History of Present Illness:     I have reviewed labs/imaging/records from this hospitalization, including ER staff to establish a comprehensive understanding of this patient's clinical issues, as well as to establish plan of care appropriately.     Patient is a 73-year-old female with medical history of paroxysmal atrial fibrillation, chronic diastolic congestive heart failure, hypertension, hypothyroidism, obstructive sleep apnea on CPAP, chronic kidney disease stage 4 and dementia who was presented to the ER today with complaints of shortness of breath.  The patient does appear to give good history, however, per her boyfriend, Milton, who has known the patient for a very long time, the patient has been quite forgetful.  She apparently tells you what she thinks would appease you.  From what I can gather, patient decided not to wear her CPAP machine last night as Milton went over  to visit her and this morning she woke up feeling very short of breath so he called EMS.  Upon arrival, EMS found her to be very dyspneic and placed CPAP on her and brought her in for evaluation.  Patient states that she has not had any other symptoms of chest pain, palpitations, pressure, cough, fever or chills.  She follows with Dr. Flores who has apparently been tweaking her blood pressure medications.      Milton states that patient often forgets to take her medications and sometimes takes too much of her medications.  When I asked her about her INR being so low, she states that she has been taking her medications as instructed, however, Milton states that she often skips doses or takes too many due to forgetfulness.  Patient denies any history of coronary artery disease.  I am able to find an  echocardiogram from 2016 which revealed normal LV functions without any significant valvular abnormalities.    Of note, Milton is her financial power of  and her brother Nickolas is her medical power of .    Patient's vitals on arrival are notable for blood pressure 192/76 with a pulse rate of 68 and respiration 24.  Labs notable for INR 1.48, H&H 9.9/31.  .3.  Negative troponin.  ProBNP 4120.  BUN/creatinine 30/1.8.  Venous blood gas on arrival 7.3/40 6/66 on 50% of FiO2.  Chest x-ray suggestive of bilateral mixed interstitial and alveolar opacities concerning for flash pulmonary edema versus pneumonia as per radiologist read.  EEG shows normal sinus rhythm without any acute ischemic changes.  Patient was given 20 of IV Lasix along with Nitrostat and placed on BiPAP on arrival.  Her blood pressure has improved significantly.  She is being admitted to the hospitalist service for treatment and management of acute pulmonary edema in the setting of hypertensive urgency.     Patient currently lives alone. Her friends help her with ADLs. They apparently also put her pills in pillboxes for her, but she often takes more or less than what they lay out for her.     Review of Systems   1. Constitutional: Negative for fever. Negative for chills, diaphoresis, fatigue and unexpected weight change.   2. HENT: Negative for congestion and hearing loss.   3. Eyes: Negative for redness and visual disturbance.   4. Respiratory: + shortness of breath. Negative for chest pain . Negative for cough and chest tightness.   5. Cardiovascular: Negative for chest pain and palpitations.   6. Gastrointestinal: Negative for abdominal distention, abdominal pain and blood in stool.   7. Endocrine: Negative for cold intolerance and heat intolerance.   8. Genitourinary: Negative for difficulty urinating, dysuria and frequency.   9. Musculoskeletal: Negative for arthralgias, back pain and myalgias.   10. Skin: Negative for color  change, rash and wound.   11. Neurological: Negative for syncope, weakness and headaches.   12. Hematological: Negative for adenopathy. Does not bruise/bleed easily.   13. Psychiatric/Behavioral: Negative for confusion. The patient is not nervous/anxious.     Past Medical History:   Past Medical History:   Diagnosis Date   • Anemia    • Arthritis    • Cancer (CMS/HCC)     breast   • Congestive heart failure (CMS/HCC)    • Diabetes mellitus (CMS/HCC)     DIET CONTROLLED   • Disease of thyroid gland    • Hearing loss    • Heart disease    • Hyperlipidemia    • Hypertension    • Kidney disease    • Sleep apnea        Past Surgical History:  Past Surgical History:   Procedure Laterality Date   • BACK SURGERY  2015   • BREAST BIOPSY  1998   • BREAST EXCISIONAL BIOPSY Left 02/08/1999   • BREAST RECONSTRUCTION Bilateral    • CAROTID ARTERY ANGIOPLASTY Right 2008    ([facility])   • CATARACT EXTRACTION Bilateral    • COLONOSCOPY N/A 8/17/2017    Procedure: COLONOSCOPY;  Surgeon: Sindy Clement MD;  Location: [facility] ENDOSCOPY;  Service:    • HYSTERECTOMY     • MASTECTOMY Bilateral 2010       Family History: family history includes Breast cancer in her maternal grandmother and paternal grandmother; Cancer in her mother; Diabetes in her mother; Heart disease in her father; Hypertension in her father; Stroke in her father.    Social History:  reports that she quit smoking about 15 years ago. She has never used smokeless tobacco. She reports that she drinks alcohol. She reports that she does not use drugs.    Home medications:   Prior to Admission Medications     Prescriptions Last Dose Informant Patient Reported? Taking?    acetaminophen (TYLENOL) 325 MG tablet   Yes No    Take 650 mg by mouth Every 6 (Six) Hours As Needed for Mild Pain (1-3).    aspirin 81 MG EC tablet   Yes No    Take 81 mg by mouth Daily.    atorvastatin (LIPITOR) 40 MG tablet  Self Yes No    Take 40 mg by mouth Daily.    carvedilol (COREG)  25 MG tablet   Yes No    Take 25 mg by mouth 2 (two) times a day.    Cholecalciferol (VITAMIN D3) 5000 UNITS capsule capsule   Yes No    Take 5,000 Units by mouth Daily.    donepezil (ARICEPT) 10 MG tablet   No No    TAKE 1 TABLET BY MOUTH  EVERY NIGHT    ferrous sulfate 325 (65 FE) MG tablet   Yes No    gabapentin (NEURONTIN) 600 MG tablet   Yes No    Take 600 mg by mouth 2 (Two) Times a Day.    losartan (COZAAR) 50 MG tablet   Yes No    Take 50 mg by mouth Daily.    spironolactone (ALDACTONE) 25 MG tablet   Yes No    Take 25 mg by mouth Daily.    warfarin (COUMADIN) 1 MG tablet   Yes No    Take 1 mg by mouth every 5 (five) minutes as needed.    warfarin (COUMADIN) 4 MG tablet   Yes No          Emergency department medications:   Medications   sodium chloride 0.9 % flush 10 mL (not administered)   nitroglycerin (NITROSTAT) ointment 1 inch (1 inch Topical Given 4/7/19 1010)   furosemide (LASIX) injection 20 mg (20 mg Intravenous Given 4/7/19 1036)       Medications:  Medications Prior to Admission   Medication Sig Dispense Refill Last Dose   • acetaminophen (TYLENOL) 325 MG tablet Take 650 mg by mouth Every 6 (Six) Hours As Needed for Mild Pain (1-3).   Taking   • aspirin 81 MG EC tablet Take 81 mg by mouth Daily.   Taking   • atorvastatin (LIPITOR) 40 MG tablet Take 40 mg by mouth Daily.   Taking   • carvedilol (COREG) 25 MG tablet Take 25 mg by mouth 2 (two) times a day.   Taking   • Cholecalciferol (VITAMIN D3) 5000 UNITS capsule capsule Take 5,000 Units by mouth Daily.   Taking   • donepezil (ARICEPT) 10 MG tablet TAKE 1 TABLET BY MOUTH  EVERY NIGHT 90 tablet 1    • ferrous sulfate 325 (65 FE) MG tablet   5 Taking   • gabapentin (NEURONTIN) 600 MG tablet Take 600 mg by mouth 2 (Two) Times a Day.   Taking   • losartan (COZAAR) 50 MG tablet Take 50 mg by mouth Daily.   Taking   • spironolactone (ALDACTONE) 25 MG tablet Take 25 mg by mouth Daily.   Taking   • warfarin (COUMADIN) 1 MG tablet Take 1 mg by mouth every  "5 (five) minutes as needed.   Taking   • warfarin (COUMADIN) 4 MG tablet    Taking       Allergies:  Allergies   Allergen Reactions   • Cephalosporins Anaphylaxis   • Quinolones Anaphylaxis   • Ciprofloxacin    • Keflex [Cephalexin] Hives         Objective     Physical Exam:  Vital Signs: /71   Pulse 64   Temp 98.3 °F (36.8 °C) (Axillary)   Resp 20   Ht 165.1 cm (65\")   Wt 88 kg (194 lb)   SpO2 94%   BMI 32.28 kg/m²      Physical Exam:     General Appearance:   Alert, cooperative, in no acute distress.     Head:   Normocephalic, without obvious abnormality, atraumatic.     Eyes:       Normal, conjunctivae and sclerae, no icterus, no pallor, corneas clear, PERRLA        Throat:   Oral mucosa dry      Neck:  No adenopathy, supple, trachea midline, no thyromegaly, no carotid bruit, no JVD      Back:   No CVA tenderness on Percussion.     Lungs:    Clear to auscultation and fair air movement noted.      Heart:   Regular rhythm and normal rate, normal S1 and S2.       Abdomen:   Obese. Normal bowel sounds, no masses, no organomegaly, soft non-tender, non-distended, no guarding, no rebound tenderness        Extremities:  Moves all extremities, 2+ pitting edema, no cyanosis, no redness.     Pulses:  Pulses palpable and equal bilaterally but weak.     Skin:  No bleeding, bruising or rash        Neurologic:  Cranial nerves grossly intact, move all extremities         Results Review:  Lab Results (last 7 days)     Procedure Component Value Units Date/Time    [place] Draw [[id number]] Collected:  04/07/19 1001    Specimen:  Blood Updated:  04/07/19 1115    Narrative:       The following orders were created for panel order [place] Draw.  Procedure                               Abnormality         Status                     ---------                               -----------         ------                     Light Blue Top[[id number]]                                   Final result               Lavender " Top[[id number]]                                     Final result               Gold Top - SST[[id number]]                                   Final result               Green Top (No Gel)[[id number]]                               Final result                 Please view results for these tests on the individual orders.    Light Blue Top [[id number]] Collected:  04/07/19 1001    Specimen:  Blood Updated:  04/07/19 1115     Extra Tube hold for add-on     Comment: Auto resulted       Lavender Top [[id number]] Collected:  04/07/19 1001    Specimen:  Blood Updated:  04/07/19 1115     Extra Tube hold for add-on     Comment: Auto resulted       Green Top (No Gel) [[id number]] Collected:  04/07/19 1001    Specimen:  Blood Updated:  04/07/19 1115     Extra Tube Hold for add-ons.     Comment: Auto resulted.       Gold Top - SST [[id number]] Collected:  04/07/19 1001    Specimen:  Blood Updated:  04/07/19 1115     Extra Tube Hold for add-ons.     Comment: Auto resulted.       BNP [[id number]]  (Abnormal) Collected:  04/07/19 1001    Specimen:  Blood Updated:  04/07/19 1031     proBNP 4,120.0 pg/mL     Comprehensive Metabolic Panel [[id number]]  (Abnormal) Collected:  04/07/19 1001    Specimen:  Blood Updated:  04/07/19 1030     Glucose 173 mg/dL      BUN 30 mg/dL      Creatinine 1.80 mg/dL      Sodium 140 mmol/L      Potassium 4.3 mmol/L      Chloride 107 mmol/L      CO2 24.0 mmol/L      Calcium 10.0 mg/dL      Total Protein 6.2 g/dL      Albumin 3.90 g/dL      ALT (SGPT) 31 U/L      AST (SGOT) 23 U/L      Alkaline Phosphatase 65 U/L      Total Bilirubin 0.5 mg/dL      eGFR Non African Amer 28 mL/min/1.73      Globulin 2.3 gm/dL      A/G Ratio 1.7 g/dL      BUN/Creatinine Ratio 16.7     Anion Gap 13.3 mmol/L     Narrative:       The MDRD GFR formula is only valid for adults with stable renal function between ages 18 and 70.    Troponin [[id number]]  (Normal) Collected:  04/07/19 1001    Specimen:  Blood Updated:  04/07/19 1030      Troponin I <0.012 ng/mL     Narrative:       Normal Patient Upper Reference Limit (URL) (99th Percentile)=0.03 ng/mL   Non-AMI Illness Reference Limit=0.03-0.11 ng/mL   AMI Confirmation=0.12 ng/mL and above    Protime-INR [[id number]]  (Abnormal) Collected:  04/07/19 1001    Specimen:  Blood Updated:  04/07/19 1022     Protime 18.3 Seconds      INR 1.48    Narrative:       Suggested INR therapeutic range for stable oral anticoagulant therapy:    Low Intensity therapy:   1.5-2.0  Moderate Intensity therapy:   2.0-3.0  High Intensity therapy:   2.5-4.0    Blood Gas, Venous [[id number]]  (Abnormal) Collected:  04/07/19 1015    Specimen:  Venous Blood Updated:  04/07/19 1015     Site OTHER     pH, Venous 7.307 pH Units      pCO2, Venous 46.9 mm Hg      pO2, Venous 66.3 mm Hg      Comment: 83 Value above reference range        HCO3, Venous 23.4 mmol/L      Base Excess, Venous -3.0 mmol/L      Comment: 84 Value below reference range        O2 Saturation, Venous 90.2 %      Comment: 83 Value above reference range        Barometric Pressure for Blood Gas 735 mmHg      Modality Room Air     FIO2 50 %      Ventilator Mode NA     Comment: Meter: [id number]     :  [id number]       CBC & Differential [[id number]] Collected:  04/07/19 1001    Specimen:  Blood Updated:  04/07/19 1007    Narrative:       The following orders were created for panel order CBC & Differential.  Procedure                               Abnormality         Status                     ---------                               -----------         ------                     CBC Auto Differential[[id number]]        Abnormal            Final result                 Please view results for these tests on the individual orders.    CBC Auto Differential [[id number]]  (Abnormal) Collected:  04/07/19 1001    Specimen:  Blood Updated:  04/07/19 1007     WBC 9.70 10*3/mm3      RBC 3.11 10*6/mm3      Hemoglobin 9.9 g/dL      Hematocrit 31.2 %      .3 fL       MCH 31.8 pg      MCHC 31.7 g/dL      RDW 14.1 %      RDW-SD 52.7 fl      MPV 10.6 fL      Platelets 214 10*3/mm3      Neutrophil % 83.8 %      Lymphocyte % 9.6 %      Monocyte % 5.4 %      Eosinophil % 0.5 %      Basophil % 0.4 %      Immature Grans % 0.3 %      Neutrophils, Absolute 8.13 10*3/mm3      Lymphocytes, Absolute 0.93 10*3/mm3      Monocytes, Absolute 0.52 10*3/mm3      Eosinophils, Absolute 0.05 10*3/mm3      Basophils, Absolute 0.04 10*3/mm3      Immature Grans, Absolute 0.03 10*3/mm3      nRBC 0.0 /100 WBC         Imaging Results (last 72 hours)     Procedure Component Value Units Date/Time    XR Chest 1 View [[id number]] Collected:  04/07/19 1154     Updated:  04/07/19 1154    Narrative:       PROCEDURE: XR CHEST 1 VW-     INDICATION:  SOA Triage Protocol     FINDINGS:  A portable view of the chest was obtained.  Comparison is  made to a prior exam dated 02/03/2016. Heart size is normal. There are  bilateral mixed interstitial and alveolar opacities which are concerning  for pulmonary edema or pneumonia. There are likely small pleural  effusions. There is no pneumothorax.       Impression:       Bilateral mixed interstitial and alveolar opacities  concerning for either pulmonary edema or pneumonia.                 I have personally reviewed and interpreted available lab data, radiology studies and ECG obtained at time of admission.     Assessment / Plan     Assessment/Problem List:       Acute pulmonary edema (CMS/HCC)    Atrial fibrillation (CMS/HCC)    Shortness of breath    Obstructive sleep apnea    Hypertensive urgency    Hypothyroidism (acquired)    1.  Acute flash pulmonary edema, likely secondary to #2, present on admission  2.  Hypertensive urgency, present on admission, now improved  3.  Acute on chronic diastolic congestive heart failure, present on admission  4.  Paroxysmal atrial fibrillation, on coumadin  5.  Hypothyroidism  6.  Hyperlipidemia  7.  Early dementia  9.  Chronic kidney  disease stage IV  10. Subtherapeutic INR    Plan:  Patient will be [facility] with telemetry.  She has received 20 mg of IV Lasix along with nitro paste which has improved her blood pressure significantly and she is down to 120s over 80s.  She is on Losartan, Coreg and Aldactone at home. I will continue these medications and add 40mg IV Lasix daily. Monitor renal functions closely. Check 2-D echocardiogram.     Pharmacy to dose coumadin. In view of patient's progressive dementia and difficulty managing her own medications, it may be better for her to be on an novel oral anticoagulant, preferably Eliquis. Will defer this to patient's primary cardiology.     Her kidney functions are stable at this time. Would consider outpatient nephrology referral.     Patient's medical power of  is her brother, Nickolas, who lives in [place] and is a physician.   Patient's financial power of  is Milton, who lives in [place] and visits her often.     Details were discussed with the patient as well as family in the room.   Further recommendations will depend on clinical course of the patient during the current hospitalization.    I also discussed the details with the nursing staff.    Rest as ordered.    Anticipated stay is less than 2 midnights.    Carlene Jarvis MD 04/07/19 12:08 PM    Dictated using Dragon.

## 2019-04-07 NOTE — PROGRESS NOTES
"Pharmacy Consult  -  Warfarin    Tammy Pascual is a  73 y.o. female , pharmacy consulted for warfarin dosing  Height - 165.1 cm (65\")  Weight - 88 kg (194 lb)  Consulting Provider: - Simona  Indication: - A.Fib  Goal INR: -  2-3  Home Regimen:   - unknown at this time-will clarify on Monday 4/8/19   - Looks like she had a prescription filled in March for warfarin 4 mg x 90 tablets (90 day supply)  Bridge Therapy: No  Warfarin Dosing During Admission:    Date  4/7           INR  1.48           Dose  4 mg            Labs:  Results from last 7 days   Lab Units 04/07/19  1001   INR  1.48*   HEMOGLOBIN g/dL 9.9*   HEMATOCRIT % 31.2*   PLATELETS 10*3/mm3 214     Results from last 7 days   Lab Units 04/07/19  1001   SODIUM mmol/L 140   POTASSIUM mmol/L 4.3   CHLORIDE mmol/L 107   CO2 mmol/L 24.0*   BUN mg/dL 30*   CREATININE mg/dL 1.80*   CALCIUM mg/dL 10.0   BILIRUBIN mg/dL 0.5   ALK PHOS U/L 65   ALT (SGPT) U/L 31   AST (SGOT) U/L 23   GLUCOSE mg/dL 173*     INR subtherapeutic, will start with warfarin 4 mg PO daily   Pharmacy will continue to follow PT/INR results and monitor for signs and symptoms of bleeding or thrombosis.    Thank you  Nimisha Menon RPH  4/7/2019  1:51 PM  "

## 2019-04-08 ENCOUNTER — APPOINTMENT (OUTPATIENT)
Dept: CARDIOLOGY | Facility: HOSPITAL | Age: 74
End: 2019-04-08

## 2019-04-08 VITALS
OXYGEN SATURATION: 98 % | SYSTOLIC BLOOD PRESSURE: 114 MMHG | HEART RATE: 56 BPM | BODY MASS INDEX: 32.37 KG/M2 | WEIGHT: 194.3 LBS | TEMPERATURE: 97.8 F | DIASTOLIC BLOOD PRESSURE: 73 MMHG | HEIGHT: 65 IN | RESPIRATION RATE: 18 BRPM

## 2019-04-08 PROBLEM — N18.4 CHRONIC KIDNEY DISEASE, STAGE IV (SEVERE) (HCC): Status: ACTIVE | Noted: 2019-04-08

## 2019-04-08 LAB
ANION GAP SERPL CALCULATED.3IONS-SCNC: 13.1 MMOL/L (ref 10–20)
BUN BLD-MCNC: 33 MG/DL (ref 7–20)
BUN/CREAT SERPL: 16.5 (ref 7.1–23.5)
CALCIUM SPEC-SCNC: 9.9 MG/DL (ref 8.4–10.2)
CHLORIDE SERPL-SCNC: 107 MMOL/L (ref 98–107)
CO2 SERPL-SCNC: 25 MMOL/L (ref 26–30)
CREAT BLD-MCNC: 2 MG/DL (ref 0.6–1.3)
DEPRECATED RDW RBC AUTO: 52 FL (ref 37–54)
ERYTHROCYTE [DISTWIDTH] IN BLOOD BY AUTOMATED COUNT: 14.5 % (ref 12.3–15.4)
GFR SERPL CREATININE-BSD FRML MDRD: 24 ML/MIN/1.73
GLUCOSE BLD-MCNC: 102 MG/DL (ref 74–98)
HCT VFR BLD AUTO: 27.4 % (ref 34–46.6)
HGB BLD-MCNC: 8.6 G/DL (ref 12–15.9)
INR PPP: 1.62 (ref 0.9–1.1)
MAXIMAL PREDICTED HEART RATE: 146 BPM
MCH RBC QN AUTO: 30.9 PG (ref 26.6–33)
MCHC RBC AUTO-ENTMCNC: 31.4 G/DL (ref 31.5–35.7)
MCV RBC AUTO: 98.6 FL (ref 79–97)
NT-PROBNP SERPL-MCNC: 5330 PG/ML (ref 0–125)
PLATELET # BLD AUTO: 204 10*3/MM3 (ref 140–450)
PMV BLD AUTO: 10.9 FL (ref 6–12)
POTASSIUM BLD-SCNC: 4.1 MMOL/L (ref 3.5–5.1)
PROTHROMBIN TIME: 19.7 SECONDS (ref 12–15.1)
RBC # BLD AUTO: 2.78 10*6/MM3 (ref 3.77–5.28)
SODIUM BLD-SCNC: 141 MMOL/L (ref 137–145)
STRESS TARGET HR: 124 BPM
WBC NRBC COR # BLD: 6.34 10*3/MM3 (ref 3.4–10.8)

## 2019-04-08 PROCEDURE — 25010000002 FUROSEMIDE PER 20 MG: Performed by: INTERNAL MEDICINE

## 2019-04-08 PROCEDURE — 97165 OT EVAL LOW COMPLEX 30 MIN: CPT

## 2019-04-08 PROCEDURE — 96376 TX/PRO/DX INJ SAME DRUG ADON: CPT

## 2019-04-08 PROCEDURE — 99217 PR OBSERVATION CARE DISCHARGE MANAGEMENT: CPT | Performed by: INTERNAL MEDICINE

## 2019-04-08 PROCEDURE — G0378 HOSPITAL OBSERVATION PER HR: HCPCS

## 2019-04-08 PROCEDURE — 83880 ASSAY OF NATRIURETIC PEPTIDE: CPT | Performed by: INTERNAL MEDICINE

## 2019-04-08 PROCEDURE — 85610 PROTHROMBIN TIME: CPT | Performed by: INTERNAL MEDICINE

## 2019-04-08 PROCEDURE — 93306 TTE W/DOPPLER COMPLETE: CPT

## 2019-04-08 PROCEDURE — 80048 BASIC METABOLIC PNL TOTAL CA: CPT | Performed by: INTERNAL MEDICINE

## 2019-04-08 PROCEDURE — 97161 PT EVAL LOW COMPLEX 20 MIN: CPT

## 2019-04-08 PROCEDURE — 85027 COMPLETE CBC AUTOMATED: CPT | Performed by: INTERNAL MEDICINE

## 2019-04-08 RX ORDER — WARFARIN SODIUM 1 MG/1
4 TABLET ORAL DAILY
Start: 2019-04-08 | End: 2019-07-01 | Stop reason: HOSPADM

## 2019-04-08 RX ADMIN — FERROUS SULFATE TAB EC 324 MG (65 MG FE EQUIVALENT) 324 MG: 324 (65 FE) TABLET DELAYED RESPONSE at 09:18

## 2019-04-08 RX ADMIN — HYDRALAZINE HYDROCHLORIDE 25 MG: 25 TABLET, FILM COATED ORAL at 09:18

## 2019-04-08 RX ADMIN — SODIUM CHLORIDE, PRESERVATIVE FREE 3 ML: 5 INJECTION INTRAVENOUS at 09:18

## 2019-04-08 RX ADMIN — SPIRONOLACTONE 25 MG: 25 TABLET, FILM COATED ORAL at 09:18

## 2019-04-08 RX ADMIN — CARVEDILOL 25 MG: 25 TABLET, FILM COATED ORAL at 06:37

## 2019-04-08 RX ADMIN — LOSARTAN POTASSIUM 50 MG: 50 TABLET, FILM COATED ORAL at 09:17

## 2019-04-08 RX ADMIN — FUROSEMIDE 40 MG: 10 INJECTION, SOLUTION INTRAMUSCULAR; INTRAVENOUS at 09:18

## 2019-04-08 RX ADMIN — GABAPENTIN 600 MG: 300 CAPSULE ORAL at 09:18

## 2019-04-08 RX ADMIN — ASPIRIN 81 MG: 81 TABLET, COATED ORAL at 09:18

## 2019-04-08 NOTE — THERAPY DISCHARGE NOTE
Acute Care - Occupational Therapy Initial Eval/Discharge  [facility]     Patient Name: Tammy Pascual  : 1945  MRN: [id number]  Today's Date: 2019  Onset of Illness/Injury or Date of Surgery: 19  Date of Referral to OT: 19  Referring Physician: Dr. Jarvis      Admit Date: 2019       ICD-10-CM ICD-9-CM   1. Flash pulmonary edema (CMS/HCC) J81.0 518.4   2. SOB (shortness of breath) R06.02 786.05   3. Impaired mobility and ADLs Z74.09 799.89     Patient Active Problem List   Diagnosis   • Spinal stenosis, lumbar region, with neurogenic claudication   • Malignant neoplasm of upper-outer quadrant of right breast in female, estrogen receptor positive (CMS/HCC)   • Pulmonary nodules   • Atrial fibrillation (CMS/HCC)   • Occult blood positive stool   • Shortness of breath   • Acute pulmonary edema (CMS/HCC)   • Obstructive sleep apnea   • Hypertensive urgency   • Hypothyroidism (acquired)   • Flash pulmonary edema (CMS/HCC)     Past Medical History:   Diagnosis Date   • Alzheimer's dementia 2019   • Anemia    • Arthritis    • Cancer (CMS/HCC)     breast   • Congestive heart failure (CMS/HCC)    • Diabetes mellitus (CMS/HCC)     DIET CONTROLLED   • Disease of thyroid gland    • Hearing loss    • Heart disease    • Hyperlipidemia    • Hypertension    • Kidney disease    • Sleep apnea      Past Surgical History:   Procedure Laterality Date   • BACK SURGERY     • BREAST BIOPSY     • BREAST EXCISIONAL BIOPSY Left 1999   • BREAST RECONSTRUCTION Bilateral    • CAROTID ARTERY ANGIOPLASTY Right     ([facility])   • CATARACT EXTRACTION Bilateral    • COLONOSCOPY N/A 2017    Procedure: COLONOSCOPY;  Surgeon: Sindy Clement MD;  Location: [facility] ENDOSCOPY;  Service:    • HYSTERECTOMY     • MASTECTOMY Bilateral           OT ASSESSMENT FLOWSHEET (last 12 hours)      Occupational Therapy Evaluation     Row Name 19 1050                   OT Evaluation  Time/Intention    Subjective Information  no complaints  -SD        Document Type  discharge evaluation/summary  -SD        Mode of Treatment  occupational therapy  -SD        Patient Effort  good  -SD        Symptoms Noted During/After Treatment  none  -SD           General Information    Patient Profile Reviewed?  yes  -SD        Onset of Illness/Injury or Date of Surgery  04/07/19  -SD        Referring Physician  Dr. Jarvis  -SD        Patient Observations  alert;cooperative;agree to therapy  -SD        General Observations of Patient  Supine in bed, on 2L O2  -SD        Prior Level of Function  independent:;community mobility  -SD        Equipment Currently Used at Home  cane, straight;walker, rolling;shower chair  -SD        Pertinent History of Current Functional Problem  Acute pulmonary edema; Afib, chronic diastolic CHF, HTN, hypothyroidism, SHAYY, CKD, dementia  -SD        Limitations/Impairments  safety/cognitive  -SD        Risks Reviewed  patient:;increased discomfort  -SD        Benefits Reviewed  patient:;improve function;increase independence;increase strength;increase balance  -SD        Barriers to Rehab  cognitive status  -SD           Relationship/Environment    Primary Source of Support/Comfort  significant other  -SD        Lives With  alone  -SD           Resource/Environmental Concerns    Current Living Arrangements  home/apartment/condo  -SD        Transportation Concerns  car, none  -SD           Home Main Entrance    Number of Stairs, Main Entrance  seven  -SD        Stair Railings, Main Entrance  railings on both sides of stairs  -SD           Stairs Within Home, Primary    Stairs, Within Home, Primary  to laundry   -SD        Number of Stairs, Within Home, Primary  ten  -SD        Stair Railings, Within Home, Primary  railing on right side (ascending)  -SD           Cognitive Assessment/Interventions    Additional Documentation  Cognitive Assessment/Intervention (Group)  -SD            Cognitive Assessment/Intervention- PT/OT    Orientation Status (Cognition)  oriented x 4  -SD        Follows Commands (Cognition)  verbal cues/prompting required  -SD        Safety Deficit (Cognitive)  safety precautions follow-through/compliance  -SD        Personal Safety Interventions  fall prevention program maintained;gait belt;nonskid shoes/slippers when out of bed  -SD           Safety Issues, Functional Mobility    Safety Issues Affecting Function (Mobility)  safety precautions follow-through/compliance  -SD           Bed Mobility Assessment/Treatment    Bed Mobility Assessment/Treatment  supine-sit  -SD        Supine-Sit [employer] (Bed Mobility)  conditional independence  -SD        Assistive Device (Bed Mobility)  bed rails;head of bed elevated  -SD           Functional Mobility    Functional Mobility- Ind. Level  standby assist  -SD        Functional Mobility-Distance (Feet)  312  -SD           Transfer Assessment/Treatment    Transfer Assessment/Treatment  sit-stand transfer;stand-sit transfer  -SD           Sit-Stand Transfer    Sit-Stand [employer] (Transfers)  supervision  -SD           Stand-Sit Transfer    Stand-Sit [employer] (Transfers)  supervision  -SD           ADL Assessment/Intervention    BADL Assessment/Intervention  bathing;upper body dressing;lower body dressing;grooming;feeding;toileting  -SD           Bathing Assessment/Intervention    Bathing [employer] Level  standby assist  -SD           Upper Body Dressing Assessment/Training    Upper Body Dressing [employer] Level  set up  -SD           Lower Body Dressing Assessment/Training    Lower Body Dressing [employer] Level  set up  -SD           Grooming Assessment/Training    [employer] Level (Grooming)  independent  -SD           Self-Feeding Assessment/Training    [employer] Level (Feeding)  independent  -SD           Toileting Assessment/Training    [employer] Level (Toileting)  set up  -SD           BADL  Safety/Performance    Impairments, BADL Safety/Performance  endurance/activity tolerance  -SD           General ROM    GENERAL ROM COMMENTS  WFL  -SD           MMT (Manual Muscle Testing)    General MMT Comments  4-/5  -SD           Positioning and Restraints    Pre-Treatment Position  in bed  -SD        Post Treatment Position  chair  -SD        In Chair  sitting;call light within reach;encouraged to call for assist  -SD           Pain Assessment    Additional Documentation  Pain Scale: Numbers Pre/Post-Treatment (Group)  -SD           Pain Scale: Numbers Pre/Post-Treatment    Pain Scale: Numbers, Pretreatment  0/10 - no pain  -SD        Pain Scale: Numbers, Post-Treatment  0/10 - no pain  -SD           Coping    Observed Emotional State  calm;cooperative  -SD        Verbalized Emotional State  acceptance  -SD           Plan of Care Review    Plan of Care Reviewed With  patient  -SD           Clinical Impression (OT)    Date of Referral to OT  04/07/19  -SD        OT Diagnosis  ADL decline  -SD        Patient/Family Goals Statement (OT Eval)  Increase strength and mobility  -SD        Criteria for Skilled Therapeutic Interventions Met (OT Eval)  no problems identified which require skilled intervention  -SD        Therapy Frequency (OT Eval)  evaluation only  -SD        Care Plan Review (OT)  evaluation/treatment results reviewed  -SD        Anticipated Discharge Disposition (OT)  home  -SD           Vital Signs    Pre SpO2 (%)  100  -SD        O2 Delivery Pre Treatment  supplemental O2  -SD        Intra SpO2 (%)  94  -SD        O2 Delivery Intra Treatment  room air  -SD        Post SpO2 (%)  98  -SD        O2 Delivery Post Treatment  room air  -SD           Discharge Summary (Occupational Therapy)    Additional Documentation  Discharge Summary, OT Eval (Group)  -SD           Discharge Summary, OT Eval    Reason for Discharge (OT Discharge Summary)  no further needs identified pt expecting to DC home soon  -SD         Outcomes Achieved Upon Discharge (OT Discharge Summary)  -- patient declined need for therapy services  -SD           Living Environment    Home Accessibility  stairs to enter home;stairs within home  -SD          User Key  (r) = Recorded By, (t) = Taken By, (c) = Cosigned By    Initials Name Effective Dates    Yodit Nunez OT 03/07/18 -           Occupational Therapy Education     Title: PT OT SLP Therapies (Resolved)     Topic: Occupational Therapy (Resolved)     Point: ADL training (Resolved)     Description: Instruct learner(s) on proper safety adaptation and remediation techniques during self care or transfers.   Instruct in proper use of assistive devices.    Learning Progress Summary           Patient Acceptance, E,TB, VU by SD at 4/8/2019  1:02 PM    Comment:  Benefit of OT; no IP OT needs                               User Key     Initials Effective Dates Name Provider Type Discipline    SD 03/07/18 -  Yodit Leiva OT Occupational Therapist OT                OT Recommendation and Plan  Outcome Summary/Treatment Plan (OT)  Anticipated Discharge Disposition (OT): home  Reason for Discharge (OT Discharge Summary): no further needs identified(pt expecting to DC home soon)  Therapy Frequency (OT Eval): evaluation only  Plan of Care Review  Plan of Care Reviewed With: patient  Plan of Care Reviewed With: patient  Outcome Summary: OT eval completed. Patient completed bed mobility with cond ind, transfers with SBA, walked 312' with no AD and SBA. Patient is S/U-SBA for ADLs. Patient to DC home; declined HH services. No further IP OT needs.          Outcome Measures     Row Name 04/08/19 1050             How much help from another is currently needed...    Putting on and taking off regular lower body clothing?  4  -SD      Bathing (including washing, rinsing, and drying)  3  -SD      Toileting (which includes using toilet bed pan or urinal)  4  -SD      Putting on and taking off regular upper body  clothing  4  -SD      Taking care of personal grooming (such as brushing teeth)  4  -SD      Eating meals  4  -SD      Score  23  -SD         Functional Assessment    Outcome Measure Options  AM-PAC 6 Clicks Daily Activity (OT)  -SD        User Key  (r) = Recorded By, (t) = Taken By, (c) = Cosigned By    Initials Name Provider Type    Yodit Nunez OT Occupational Therapist          Time Calculation:   Time Calculation- OT     Row Name 04/08/19 1305             Time Calculation- OT    OT Start Time  1050  -SD      OT Received On  04/08/19  -SD        User Key  (r) = Recorded By, (t) = Taken By, (c) = Cosigned By    Initials Name Provider Type    Yodit Nunez OT Occupational Therapist        Therapy Suggested Charges     Code   Minutes Charges    None           Therapy Charges for Today     Code Description Service Date Service Provider Modifiers Qty    [id number]  OT EVAL LOW COMPLEXITY 4 4/8/2019 Yodit Leiva OT GO 1               OT Discharge Summary  Anticipated Discharge Disposition (OT): home    Yodit Leiva OT  4/8/2019

## 2019-04-08 NOTE — PLAN OF CARE
Problem: Patient Care Overview  Goal: Plan of Care Review  Outcome: Ongoing (interventions implemented as appropriate)   04/08/19 0423   Coping/Psychosocial   Plan of Care Reviewed With patient   OTHER   Outcome Summary patient slept well during the night, wearing her home cpap all night. Medications taken. vitals stable and will continue to monitor.     Goal: Interprofessional Rounds/Family Conf  Outcome: Ongoing (interventions implemented as appropriate)   04/08/19 0423   Interdisciplinary Rounds/Family Conf   Participants family;nursing;patient;pharmacy;physician       Problem: Fall Risk (Adult)  Goal: Identify Related Risk Factors and Signs and Symptoms  Outcome: Ongoing (interventions implemented as appropriate)   04/08/19 0423   Fall Risk (Adult)   Related Risk Factors (Fall Risk) gait/mobility problems   Signs and Symptoms (Fall Risk) presence of risk factors     Goal: Absence of Fall  Outcome: Ongoing (interventions implemented as appropriate)   04/08/19 0423   Fall Risk (Adult)   Absence of Fall making progress toward outcome       Problem: Skin Injury Risk (Adult)  Goal: Identify Related Risk Factors and Signs and Symptoms  Outcome: Ongoing (interventions implemented as appropriate)   04/08/19 0423   Skin Injury Risk (Adult)   Related Risk Factors (Skin Injury Risk) mobility impaired;edema;advanced age     Goal: Skin Health and Integrity  Outcome: Ongoing (interventions implemented as appropriate)   04/08/19 0423   Skin Injury Risk (Adult)   Skin Health and Integrity making progress toward outcome       Problem: Breathing Pattern Ineffective (Adult)  Goal: Identify Related Risk Factors and Signs and Symptoms  Outcome: Ongoing (interventions implemented as appropriate)    Goal: Effective Oxygenation/Ventilation  Outcome: Ongoing (interventions implemented as appropriate)   04/08/19 0423   Breathing Pattern Ineffective (Adult)   Effective Oxygenation/Ventilation making progress toward outcome     Goal:  Anxiety/Fear Reduction  Outcome: Ongoing (interventions implemented as appropriate)   04/08/19 0423   Breathing Pattern Ineffective (Adult)   Anxiety/Fear Reduction making progress toward outcome

## 2019-04-08 NOTE — PROGRESS NOTES
Discharge Planning Assessment  [facility]     Patient Name: Tammy Pascual  MRN: [id number]  Today's Date: 4/8/2019    Admit Date: 4/7/2019    Discharge Needs Assessment     Row Name 04/08/19 1242       Living Environment    Lives With  alone    Current Living Arrangements  home/apartment/condo    Potentially Unsafe Housing Conditions  -- Pt denies concerns    Primary Care Provided by  self    Provides Primary Care For  no one    Family Caregiver if Needed  none    Quality of Family Relationships  supportive    Able to Return to Prior Arrangements  yes       Resource/Environmental Concerns    Resource/Environmental Concerns  none       Transition Planning    Patient/Family Anticipates Transition to  home    Patient/Family Anticipated Services at Transition  none    Transportation Anticipated  family or friend will provide       Discharge Needs Assessment    Readmission Within the Last 30 Days  no previous admission in last 30 days    Concerns to be Addressed  denies needs/concerns at this time    Equipment Currently Used at Home  bipap/cpap;cane, straight;walker, rolling    Equipment Needed After Discharge  none    Offered/Gave Vendor List  yes        Discharge Plan     Row Name 04/08/19 1240       Plan    Plan  Home    Patient/Family in Agreement with Plan  yes    Plan Comments Spoke with pt in room [facility]; she is alone.  Pt reports that she lives alone in a house.  She is independent and plans to return home.  Pt uses a cane and/or walker as needed.  She is able to provide her own transportation within [facility].  Pt denies HH and does not feel like she will need it.  Address, phone & PCP verified.  CM will continue to follow and assist with discharge as needed.        Destination      No service coordination in this encounter.      Durable Medical Equipment      No service coordination in this encounter.      Dialysis/Infusion      No service coordination in this encounter.      Home Medical Care      No  service coordination in this encounter.      Therapy      No service coordination in this encounter.      Community Resources      No service coordination in this encounter.        Expected Discharge Date and Time     Expected Discharge Date Expected Discharge Time    Apr 9, 2019         Demographic Summary     Row Name 04/08/19 1241       General Information    Admission Type  observation    Arrived From  emergency department    Required Notices Provided  Observation Status Notice    Referral Source  admission list    Reason for Consult  discharge planning    Preferred Language  English     Used During This Interaction  no        Functional Status     Row Name 04/08/19 1241       Functional Status    Usual Activity Tolerance  good       Functional Status, IADL    Medications  independent    Meal Preparation  independent    Housekeeping  independent    Laundry  independent    Shopping  independent       Mental Status    General Appearance WDL  WDL       Mental Status Summary    Recent Changes in Mental Status/Cognitive Functioning  no changes       Employment/    Employment Status  retired    Current or Previous Occupation  healthcare        Psychosocial    No documentation.       Abuse/Neglect    No documentation.       Legal    No documentation.       Substance Abuse    No documentation.       Patient Forms    No documentation.           Julieta Tse

## 2019-04-08 NOTE — PLAN OF CARE
Problem: Patient Care Overview  Goal: Plan of Care Review  Outcome: Outcome(s) achieved Date Met: 04/08/19

## 2019-04-08 NOTE — DISCHARGE SUMMARY
[facility]   DISCHARGE SUMMARY      Name:  Tammy Pascual   Age:  73 y.o.  Sex:  female  :  1945  MRN:  [id number]   Visit Number:  [id number]    Admission Date:  2019  Date of Discharge:  2019  Primary Care Physician:  Sravani Grijalva MD    Discharge Diagnoses:     1.  Acute pulmonary edema secondary to #2, present on admission, resolved.  2.  Hypertensive emergency, present on admission, resolved.  3.  Acute on chronic diastolic heart failure, present on admission, improved.  4.  Paroxysmal atrial fibrillation on Coumadin.  5.  Obstructive sleep apnea on home CPAP therapy.  6.  Chronic kidney disease stage IV.  7.  Alzheimer's dementia.  8.  Essential hypertension.  9.  Medication noncompliance.      Acute pulmonary edema (CMS/HCC)    Atrial fibrillation (CMS/HCC)    Shortness of breath    Obstructive sleep apnea    Hypertensive urgency    Hypothyroidism (acquired)    Flash pulmonary edema (CMS/HCC)    Presenting Problem:    Shortness of breath [R06.02]  Flash pulmonary edema (CMS/HCC) [J81.0]     Consults:     Consulting Physician(s)             None          Procedures Performed:    None.    History of presenting illness:    Patient is a 73-year-old female with medical history of paroxysmal atrial fibrillation, chronic diastolic congestive heart failure, hypertension, hypothyroidism, obstructive sleep apnea on CPAP, chronic kidney disease stage 4 and dementia who was presented to the ER today with complaints of shortness of breath.  The patient does appear to give good history, however, per her boyfriend, Milton, who has known the patient for a very long time, the patient has been quite forgetful.  She apparently tells you what she thinks would appease you.  Apparently, patient decided not to wear her CPAP machine last night as Milton went over  to visit her and this morning she woke up feeling very short of breath so he called EMS.  Upon arrival, EMS found her to be  very dyspneic and placed CPAP on her and brought her in for evaluation.  Patient states that she has not had any other symptoms of chest pain, palpitations, pressure, cough, fever or chills.  She follows with Dr. Flores who has apparently been tweaking her blood pressure medications.       Milton states that patient often forgets to take her medications and sometimes takes too much of her medications.  When I asked her about her INR being so low, she states that she has been taking her medications as instructed, however, Milton states that she often skips doses or takes too many due to forgetfulness.  Patient denies any history of coronary artery disease.  An echocardiogram from 2016 revealed normal LV functions without any significant valvular abnormalities.     Of note, Milton is her financial power of  and her brother Nickolas is her medical power of .     Patient's vitals on arrival are notable for blood pressure 192/76 with a pulse rate of 68 and respiration 24.  Labs notable for INR 1.48, H&H 9.9/31.  .3.  Negative troponin.  ProBNP 4120.  BUN/creatinine 30/1.8.  Venous blood gas on arrival 7.3/40 6/66 on 50% of FiO2.  Chest x-ray suggestive of bilateral mixed interstitial and alveolar opacities concerning for flash pulmonary edema versus pneumonia as per radiologist read.  EKG showed normal sinus rhythm without any acute ischemic changes.  Patient was given 20 of IV Lasix along with Nitrostat and placed on BiPAP on arrival.  Her blood pressure has improved significantly.  She is being admitted to the hospitalist service for treatment and management of acute pulmonary edema in the setting of hypertensive urgency.      Patient currently lives alone. Her friends help her with ADLs. They apparently also put her pills in pillboxes for her, but she often takes more or less than what they lay out for her.     Hospital Course:    Ms. Pascual was admitted to the medical floor with telemetry for  observation.  Her symptoms of shortness of breath improved significantly with IV Lasix and continuation of BiPAP therapy.  She did not have any chest pain or fevers.  Her initial troponins have been negative.  Her symptoms are likely related to medication noncompliance causing hypertensive urgency leading to worsening of her diastolic heart failure.  She also has chronic kidney disease and her creatinine has been stable at baseline.    Patient does live alone and also has several cats which she takes care of.  I have discussed the patient's condition with her boyfriend Milton as well as her brother Nickolas ([phone]) who is a physician in [place].  They both understand that the patient has compliance issues but will try their best to get her to take her medications and follow-up with her physicians regularly.  Patient unfortunately refused home health.  She was seen by physical therapy and was able to walk in the hallway without any difficulty.  She does follow-up with Dr. Feliciano for her chronic kidney disease, Dr. Flores for cardiology and Dr. You for neurology.    During the hospital stay, she was noted to have subtherapeutic INR.  Her Coumadin has been increased to 4 mg daily.  She is advised to follow-up with her primary care physician in 1 week with repeat INR check.    Vital Signs:    Temp:  [97.6 °F (36.4 °C)-99 °F (37.2 °C)] 97.8 °F (36.6 °C)  Heart Rate:  [48-84] 56  Resp:  [16-18] 18  BP: (114-136)/(39-94) 114/73    Physical Exam:    General Appearance:  Alert and cooperative, not in any acute distress.   Head:  Atraumatic and normocephalic, without obvious abnormality.   Eyes:          PERRLA, conjunctivae and sclerae normal, no Icterus. No pallor. Extra-occular movements are within normal limits.   Ears:  Ears appear intact with no abnormalities noted.   Throat: No oral lesions, no thrush, oral mucosa moist.   Neck: Supple, trachea midline, no thyromegaly, no carotid bruit.   Back:   No  kyphoscoliosis present. No tenderness to palpation,   range of motion normal.   Lungs:   Chest shape is normal. Breath sounds heard bilaterally equally.  No crackles or wheezing. No Pleural rub or bronchial breathing.   Heart:  Normal S1 and S2, no murmur, no gallop, no rub. No JVD.   Abdomen:   Normal bowel sounds, no masses, no organomegaly. Soft, non-tender, non-distended, no guarding, no rebound tenderness.   Extremities: Moves all extremities well, no edema, no cyanosis, no clubbing.   Pulses: Pulses palpable and equal bilaterally.   Skin: No bleeding, bruising or rash.   Neurologic: Alert and oriented x 3. Moves all four limbs equally. No tremors. No facial asymetry.     Pertinent Lab Results:     Results from last 7 days   Lab Units 04/08/19  0552 04/07/19  1001   SODIUM mmol/L 141 140   POTASSIUM mmol/L 4.1 4.3   CHLORIDE mmol/L 107 107   CO2 mmol/L 25.0* 24.0*   BUN mg/dL 33* 30*   CREATININE mg/dL 2.00* 1.80*   CALCIUM mg/dL 9.9 10.0   BILIRUBIN mg/dL  --  0.5   ALK PHOS U/L  --  65   ALT (SGPT) U/L  --  31   AST (SGOT) U/L  --  23   GLUCOSE mg/dL 102* 173*     Results from last 7 days   Lab Units 04/08/19  0552 04/07/19  1001   WBC 10*3/mm3 6.34 9.70   HEMOGLOBIN g/dL 8.6* 9.9*   HEMATOCRIT % 27.4* 31.2*   PLATELETS 10*3/mm3 204 214     Results from last 7 days   Lab Units 04/08/19  0552 04/07/19  1001   INR  1.62* 1.48*     Results from last 7 days   Lab Units 04/07/19  1001   TROPONIN I ng/mL <0.012     Results from last 7 days   Lab Units 04/08/19  0552   PROBNP pg/mL 5,330.0*     Pertinent Radiology Results:    Imaging Results (all)     Procedure Component Value Units Date/Time    XR Chest 1 View [[id number]] Collected:  04/07/19 1154     Updated:  04/07/19 1242    Narrative:       PROCEDURE: XR CHEST 1 VW-     INDICATION:  SOA Triage Protocol     FINDINGS:  A portable view of the chest was obtained.  Comparison is  made to a prior exam dated 02/03/2016. Heart size is normal. There are  bilateral mixed  interstitial and alveolar opacities which are concerning  for pulmonary edema or pneumonia. There are likely small pleural  effusions. There is no pneumothorax.       Impression:       Bilateral mixed interstitial and alveolar opacities  concerning for either pulmonary edema or pneumonia.     This report was finalized on 4/7/2019 12:40 PM by Arpita Lau MD.        2D echocardiogram done on 4/8/2019 is reported as follows:    Technically adequate study   1) Borderline LVH with normal LV systolic function ( EF is over 55%)  2) Moderate to severe left atrial enlargement with mild to moderate elevation in LVedp   3) Thickened mitral leaflets with mild MR   4) Aortic sclerosis without stenosis - mild AI seen   5) Mild TR with PAsp of 50 mm of hg   6) Normal size and function of the RV     Condition on Discharge:      Stable.    Code status during the hospital stay:    Code Status and Medical Interventions:   Ordered at: 04/07/19 1326     Level Of Support Discussed With:    Patient     Code Status:    CPR     Medical Interventions (Level of Support Prior to Arrest):    Full     Discharge Disposition:    Home or Self Care    Discharge Medications:       Discharge Medications      Changes to Medications      Instructions Start Date   warfarin 1 MG tablet  Commonly known as:  COUMADIN  What changed:    · medication strength  · how much to take  · when to take this  · additional instructions  · Another medication with the same name was removed. Continue taking this medication, and follow the directions you see here.   4 mg, Oral, Daily         Continue These Medications      Instructions Start Date   acetaminophen 325 MG tablet  Commonly known as:  TYLENOL   650 mg, Oral, Every 6 Hours PRN      aspirin 81 MG EC tablet   81 mg, Oral, Daily      atorvastatin 40 MG tablet  Commonly known as:  LIPITOR   40 mg, Oral, Nightly      carvedilol 25 MG tablet  Commonly known as:  COREG   25 mg, Oral, 2 Times Daily      donepezil 10  MG tablet  Commonly known as:  ARICEPT   10 mg, Oral, Nightly      gabapentin 600 MG tablet  Commonly known as:  NEURONTIN   600 mg, Oral, 2 Times Daily      hydrALAZINE 25 MG tablet  Commonly known as:  APRESOLINE   25 mg, Oral, 2 Times Daily      hydrochlorothiazide 25 MG tablet  Commonly known as:  HYDRODIURIL   25 mg, Oral, Daily      losartan 50 MG tablet  Commonly known as:  COZAAR   50 mg, Oral, Daily      spironolactone 25 MG tablet  Commonly known as:  ALDACTONE   25 mg, Oral, Daily      vitamin D3 5000 units capsule capsule   5,000 Units, Oral, Daily           Discharge Diet:     Diet Instructions     Diet: Cardiac; Thin      Discharge Diet:  Cardiac    Fluid Consistency:  Thin        Activity at Discharge:     Activity Instructions     Activity as Tolerated          Follow-up Appointments:    Follow-up Information     Sravani Grijalva MD Follow up in 1 week(s).    Specialty:  Family Medicine  Contact information:  [address] [facility] [place] [address]  [phone]                 Future Appointments   Date Time Provider Department Center   5/23/2019  1:00 PM Natacha Bajwa APRN [facility]   7/19/2019  3:30 PM Yonatan You MD, FAAN [facility] None     Test Results Pending at Discharge:     None.       Jayy Mason MD  04/08/19  1:55 PM    Time spent: 25 min.    Dictated utilizing Dragon dictation.

## 2019-04-08 NOTE — PLAN OF CARE
Problem: Patient Care Overview  Goal: Plan of Care Review  Outcome: Ongoing (interventions implemented as appropriate)   04/08/19 1300   Coping/Psychosocial   Plan of Care Reviewed With patient   Plan of Care Review   Progress no change   OTHER   Outcome Summary OT eval completed. Patient completed bed mobility with cond ind, transfers with SBA, walked 312' with no AD and SBA. Patient is S/U-SBA for ADLs. Patient to DC home; declined HH services. No further IP OT needs.

## 2019-04-08 NOTE — THERAPY DISCHARGE NOTE
Acute Care - Physical Therapy Initial Eval/Discharge   [place]     Patient Name: Tammy Pascual  : 1945  MRN: [id number]  Today's Date: 2019   Onset of Illness/Injury or Date of Surgery: 19  Date of Referral to PT: 19  Referring Physician: Dr. Jarvis      Admit Date: 2019    Visit Dx:    ICD-10-CM ICD-9-CM   1. Flash pulmonary edema (CMS/HCC) J81.0 518.4   2. SOB (shortness of breath) R06.02 786.05   3. Impaired mobility and ADLs Z74.09 799.89   4. Impaired functional mobility, balance, gait, and endurance Z74.09 V49.89     Patient Active Problem List   Diagnosis   • Spinal stenosis, lumbar region, with neurogenic claudication   • Malignant neoplasm of upper-outer quadrant of right breast in female, estrogen receptor positive (CMS/HCC)   • Pulmonary nodules   • Atrial fibrillation (CMS/HCC)   • Occult blood positive stool   • Shortness of breath   • Acute pulmonary edema (CMS/HCC)   • Obstructive sleep apnea   • Hypertensive urgency   • Hypothyroidism (acquired)   • Flash pulmonary edema (CMS/HCC)   • Chronic kidney disease, stage IV (severe) (CMS/HCC)     Past Medical History:   Diagnosis Date   • Alzheimer's dementia 2019   • Anemia    • Arthritis    • Cancer (CMS/HCC)     breast   • Congestive heart failure (CMS/HCC)    • Diabetes mellitus (CMS/HCC)     DIET CONTROLLED   • Disease of thyroid gland    • Hearing loss    • Heart disease    • Hyperlipidemia    • Hypertension    • Kidney disease    • Sleep apnea      Past Surgical History:   Procedure Laterality Date   • BACK SURGERY     • BREAST BIOPSY     • BREAST EXCISIONAL BIOPSY Left 1999   • BREAST RECONSTRUCTION Bilateral    • CAROTID ARTERY ANGIOPLASTY Right     ([facility])   • CATARACT EXTRACTION Bilateral    • COLONOSCOPY N/A 2017    Procedure: COLONOSCOPY;  Surgeon: Sindy Clement MD;  Location: [facility] ENDOSCOPY;  Service:    • HYSTERECTOMY     • MASTECTOMY Bilateral            PT ASSESSMENT (last 12 hours)      Physical Therapy Evaluation     Row Name 04/08/19 1049          PT Evaluation Time/Intention    Subjective Information  no complaints  -JR     Document Type  discharge evaluation/summary  -JR     Mode of Treatment  physical therapy  -JR     Patient Effort  good  -JR     Symptoms Noted During/After Treatment  none  -JR     Row Name 04/08/19 1049          General Information    Patient Profile Reviewed?  yes  -JR     Onset of Illness/Injury or Date of Surgery  04/07/19  -JR     Referring Physician  Carlene Jarvis MD  -JR     Patient Observations  alert;agree to therapy;cooperative  -JR     Patient/Family Observations  No family present at the time of eval  -JR     General Observations of Patient  Supine with 2 LPM of oxygen  -JR     Prior Level of Function  independent:;community mobility  -JR     Equipment Currently Used at Home  cane, straight;walker, rolling;shower chair  -JR     Pertinent History of Current Functional Problem  Acute pulmonary edema, paroxysmal A-fib, chronic diastolic HF, HTN, hypothyroid, SHAYY on CPAP, CKD stage 4, dementia  -JR     Limitations/Impairments  safety/cognitive  -JR     Risks Reviewed  patient:;increased discomfort  -JR     Benefits Reviewed  patient:;increase balance;increase strength;increase independence;improve function  -JR     Row Name 04/08/19 1049          Relationship/Environment    Primary Source of Support/Comfort  friend;significant other  -JR     Lives With  alone  -JR     Row Name 04/08/19 1049          Resource/Environmental Concerns    Current Living Arrangements  home/apartment/condo  -JR     Row Name 04/08/19 1049          Home Main Entrance    Number of Stairs, Main Entrance  seven  -JR     Stair Railings, Main Entrance  railings on both sides of stairs  -JR     Row Name 04/08/19 1049          Stairs Within Home, Primary    Stairs, Within Home, Primary  to laundry area  -JR     Number of Stairs, Within Home, Primary  ten  -JR      Stair Railings, Within Home, Primary  railing on right side (ascending)  -     Row Name 04/08/19 1049          Cognitive Assessment/Intervention- PT/OT    Orientation Status (Cognition)  oriented x 4  -JR     Follows Commands (Cognition)  verbal cues/prompting required;physical/tactile prompts required  -     Safety Deficit (Cognitive)  safety precautions follow-through/compliance  -     Personal Safety Interventions  fall prevention program maintained;gait belt;nonskid shoes/slippers when out of bed  -     Row Name 04/08/19 1049          Safety Issues, Functional Mobility    Safety Issues Affecting Function (Mobility)  safety precautions follow-through/compliance  -[facility] Name 04/08/19 1049          Bed Mobility Assessment/Treatment    Bed Mobility Assessment/Treatment  supine-sit  -     Supine-Sit [place] (Bed Mobility)  conditional independence  -     Assistive Device (Bed Mobility)  head of bed elevated;bed rails  -[facility] Name 04/08/19 1049          Transfer Assessment/Treatment    Transfer Assessment/Treatment  sit-stand transfer;stand-sit transfer  -     Sit-Stand [place] (Transfers)  supervision  -     Stand-Sit [place] (Transfers)  supervision  -[facility] Name 04/08/19 1049          Gait/Stairs Assessment/Training    Gait/Stairs Assessment/Training  gait/ambulation independence  -     [place] Level (Gait)  supervision  -     Distance in Feet (Gait)  312  -JR     Pattern (Gait)  step-through  -     Deviations/Abnormal Patterns (Gait)  stride length decreased;gait speed decreased  -     Bilateral Gait Deviations  forward flexed posture;heel strike decreased  -     Row Name 04/08/19 1049          General ROM    GENERAL ROM COMMENTS  WFL  -     Row Name 04/08/19 1049          MMT (Manual Muscle Testing)    General MMT Comments  [facility]  -[facility] Name 04/08/19 1049          Pain Assessment    Additional Documentation  Pain Scale: Numbers  Pre/Post-Treatment (Group)  -     Row Name 04/08/19 1049          Pain Scale: Numbers Pre/Post-Treatment    Pain Scale: Numbers, Pretreatment  0/10 - no pain  -     Pain Scale: Numbers, Post-Treatment  0/10 - no pain  -[facility] Name 04/08/19 1049          Coping    Observed Emotional State  pleasant;cooperative  -     Verbalized Emotional State  hopefulness  -     Row Name 04/08/19 1049          Plan of Care Review    Plan of Care Reviewed With  patient  -[facility] Name 04/08/19 1049          Physical Therapy Clinical Impression    Date of Referral to PT  04/07/19  -     PT Diagnosis (PT Clinical Impression)  weakness  -     Patient/Family Goals Statement (PT Clinical Impression)  Patient wants to go home  -     Criteria for Skilled Interventions Met (PT Clinical Impression)  -- patient is being discharged to home today  -     Care Plan Review (PT)  evaluation/treatment results reviewed;patient/other agree to care plan  -[facility] Name 04/08/19 1049          Positioning and Restraints    Pre-Treatment Position  in bed  -     Post Treatment Position  chair  -     In Chair  sitting;call light within reach;encouraged to call for assist  -[facility] Name 04/08/19 1049          Living Environment    Home Accessibility  stairs to enter home;stairs within home  -       User Key  (r) = Recorded By, (t) = Taken By, (c) = Cosigned By    Initials Name Provider Type    Sweta Martinez PT Physical Therapist          Physical Therapy Education     Title: PT OT SLP Therapies (Resolved)     Topic: Physical Therapy (Resolved)     Point: Mobility training (Resolved)     Learning Progress Summary           Patient Acceptance, E,TB, VU by  at 4/8/2019  3:52 PM    Comment:  Role of PT                               User Key     Initials Effective Dates Name Provider Type Discipline     04/03/18 -  Sweta Natarajan, OPAL Physical Therapist PT                PT Recommendation and Plan  Therapy Frequency (PT  Clinical Impression): evaluation only  Plan of Care Reviewed With: patient  Outcome Summary: PT evaluation completed.  Patient is being discharged to home and would benefit from home health services; however, she declines and states she will do things herself.      Outcome Measures     Row Name 04/08/19 1050 04/08/19 1049          How much help from another person do you currently need...    Turning from your back to your side while in flat bed without using bedrails?  --  4  -JR     Moving from lying on back to sitting on the side of a flat bed without bedrails?  --  4  -JR     Moving to and from a bed to a chair (including a wheelchair)?  --  4  -JR     Standing up from a chair using your arms (e.g., wheelchair, bedside chair)?  --  4  -JR     Climbing 3-5 steps with a railing?  --  3  -JR     To walk in hospital room?  --  4  -JR     AM-PAC 6 Clicks Score  --  23  -JR        How much help from another is currently needed...    Putting on and taking off regular lower body clothing?  4  -SD  --     Bathing (including washing, rinsing, and drying)  3  -SD  --     Toileting (which includes using toilet bed pan or urinal)  4  -SD  --     Putting on and taking off regular upper body clothing  4  -SD  --     Taking care of personal grooming (such as brushing teeth)  4  -SD  --     Eating meals  4  -SD  --     Score  23  -SD  --        Functional Assessment    Outcome Measure Options  AM-PAC 6 Clicks Daily Activity (OT)  -SD  AM-PAC 6 Clicks Basic Mobility (PT)  -       User Key  (r) = Recorded By, (t) = Taken By, (c) = Cosigned By    Initials Name Provider Type    Sweta Martinez, PT Physical Therapist    Yodit Nunez, OT Occupational Therapist           Time Calculation:   PT Charges     Row Name 04/08/19 1555             Time Calculation    Start Time  1049  -JR      PT Received On  04/08/19  -        User Key  (r) = Recorded By, (t) = Taken By, (c) = Cosigned By    Initials Name Provider Type    JR Natarajan  Sweta, PT Physical Therapist        Therapy Charges for Today     Code Description Service Date Service Provider Modifiers Qty    81323521509 HC PT EVAL LOW COMPLEXITY 4 4/8/2019 Sweta Natarajan, PT GP 1          PT G-Codes  Outcome Measure Options: AM-PAC 6 Clicks Daily Activity (OT)  AM-PAC 6 Clicks Score: 23  Score: 23         Sweta Natarajan, PT  4/8/2019

## 2019-04-08 NOTE — PLAN OF CARE
Problem: Patient Care Overview  Goal: Plan of Care Review  Outcome: Ongoing (interventions implemented as appropriate)   04/08/19 4811   Coping/Psychosocial   Plan of Care Reviewed With patient   OTHER   Outcome Summary PT evaluation completed. Patient is being discharged to home and would benefit from home health services; however, she declines and states she will do things herself.

## 2019-04-08 NOTE — PROGRESS NOTES
Case Management Discharge Note         Destination      No service has been selected for the patient.      Durable Medical Equipment      No service has been selected for the patient.      Dialysis/Infusion      No service has been selected for the patient.      Home Medical Care      No service has been selected for the patient.      Therapy      No service has been selected for the patient.      Community Resources      No service has been selected for the patient.        Transportation Services  Private: Car    Final Discharge Disposition Code: 01 - home or self-care

## 2019-04-08 NOTE — DISCHARGE INSTR - OTHER ORDERS
If you have any questions about your recovery, please call the [facility] Nurse Call Center at [phone]. A registered nurse is available 24 hours a day 7 days a week to assist you.

## 2019-04-11 ENCOUNTER — TRANSCRIBE ORDERS (OUTPATIENT)
Dept: ADMINISTRATIVE | Facility: HOSPITAL | Age: 74
End: 2019-04-11

## 2019-04-11 ENCOUNTER — LAB (OUTPATIENT)
Dept: LAB | Facility: HOSPITAL | Age: 74
End: 2019-04-11

## 2019-04-11 DIAGNOSIS — I48.91 ATRIAL FIBRILLATION, UNSPECIFIED TYPE (HCC): Primary | ICD-10-CM

## 2019-04-11 DIAGNOSIS — E78.5 HYPERLIPIDEMIA, UNSPECIFIED HYPERLIPIDEMIA TYPE: ICD-10-CM

## 2019-04-11 DIAGNOSIS — E78.5 HYPERLIPIDEMIA, UNSPECIFIED HYPERLIPIDEMIA TYPE: Primary | ICD-10-CM

## 2019-04-11 DIAGNOSIS — I48.91 ATRIAL FIBRILLATION, UNSPECIFIED TYPE (HCC): ICD-10-CM

## 2019-04-11 LAB
ALBUMIN SERPL-MCNC: 4 G/DL (ref 3.5–5)
ALBUMIN/GLOB SERPL: 1.6 G/DL (ref 1–2)
ALP SERPL-CCNC: 58 U/L (ref 38–126)
ALT SERPL W P-5'-P-CCNC: 22 U/L (ref 13–69)
ANION GAP SERPL CALCULATED.3IONS-SCNC: 15 MMOL/L (ref 10–20)
AST SERPL-CCNC: 25 U/L (ref 15–46)
BASOPHILS # BLD AUTO: 0.02 10*3/MM3 (ref 0–0.2)
BASOPHILS NFR BLD AUTO: 0.4 % (ref 0–1.5)
BILIRUB SERPL-MCNC: 0.5 MG/DL (ref 0.2–1.3)
BUN BLD-MCNC: 38 MG/DL (ref 7–20)
BUN/CREAT SERPL: 19 (ref 7.1–23.5)
CALCIUM SPEC-SCNC: 10.3 MG/DL (ref 8.4–10.2)
CHLORIDE SERPL-SCNC: 104 MMOL/L (ref 98–107)
CHOLEST SERPL-MCNC: 119 MG/DL (ref 0–199)
CO2 SERPL-SCNC: 26 MMOL/L (ref 26–30)
CREAT BLD-MCNC: 2 MG/DL (ref 0.6–1.3)
DEPRECATED RDW RBC AUTO: 51.8 FL (ref 37–54)
EOSINOPHIL # BLD AUTO: 0.04 10*3/MM3 (ref 0–0.4)
EOSINOPHIL NFR BLD AUTO: 0.8 % (ref 0.3–6.2)
ERYTHROCYTE [DISTWIDTH] IN BLOOD BY AUTOMATED COUNT: 14.3 % (ref 12.3–15.4)
GFR SERPL CREATININE-BSD FRML MDRD: 24 ML/MIN/1.73
GLOBULIN UR ELPH-MCNC: 2.5 GM/DL
GLUCOSE BLD-MCNC: 117 MG/DL (ref 74–98)
HBA1C MFR BLD: 5.8 % (ref 3–6)
HCT VFR BLD AUTO: 28 % (ref 34–46.6)
HDLC SERPL-MCNC: 58 MG/DL (ref 40–60)
HGB BLD-MCNC: 9 G/DL (ref 12–15.9)
IMM GRANULOCYTES # BLD AUTO: 0.03 10*3/MM3 (ref 0–0.05)
IMM GRANULOCYTES NFR BLD AUTO: 0.6 % (ref 0–0.5)
INR PPP: 1.6 (ref 0.9–1.1)
LDLC SERPL CALC-MCNC: 53 MG/DL (ref 0–99)
LDLC/HDLC SERPL: 0.92 {RATIO}
LYMPHOCYTES # BLD AUTO: 1.01 10*3/MM3 (ref 0.7–3.1)
LYMPHOCYTES NFR BLD AUTO: 19.1 % (ref 19.6–45.3)
MCH RBC QN AUTO: 31.7 PG (ref 26.6–33)
MCHC RBC AUTO-ENTMCNC: 32.1 G/DL (ref 31.5–35.7)
MCV RBC AUTO: 98.6 FL (ref 79–97)
MONOCYTES # BLD AUTO: 0.45 10*3/MM3 (ref 0.1–0.9)
MONOCYTES NFR BLD AUTO: 8.5 % (ref 5–12)
NEUTROPHILS # BLD AUTO: 3.75 10*3/MM3 (ref 1.4–7)
NEUTROPHILS NFR BLD AUTO: 70.6 % (ref 42.7–76)
NRBC BLD AUTO-RTO: 0 /100 WBC (ref 0–0)
PLATELET # BLD AUTO: 194 10*3/MM3 (ref 140–450)
PMV BLD AUTO: 10.7 FL (ref 6–12)
POTASSIUM BLD-SCNC: 4 MMOL/L (ref 3.5–5.1)
PROT SERPL-MCNC: 6.5 G/DL (ref 6.3–8.2)
PROTHROMBIN TIME: 19.4 SECONDS (ref 12–15.1)
RBC # BLD AUTO: 2.84 10*6/MM3 (ref 3.77–5.28)
SODIUM BLD-SCNC: 141 MMOL/L (ref 137–145)
TRIGL SERPL-MCNC: 38 MG/DL
VLDLC SERPL-MCNC: 7.6 MG/DL
WBC NRBC COR # BLD: 5.3 10*3/MM3 (ref 3.4–10.8)

## 2019-04-11 PROCEDURE — 83036 HEMOGLOBIN GLYCOSYLATED A1C: CPT | Performed by: FAMILY MEDICINE

## 2019-04-11 PROCEDURE — 80061 LIPID PANEL: CPT | Performed by: FAMILY MEDICINE

## 2019-04-11 PROCEDURE — 85610 PROTHROMBIN TIME: CPT

## 2019-04-11 PROCEDURE — 36415 COLL VENOUS BLD VENIPUNCTURE: CPT | Performed by: FAMILY MEDICINE

## 2019-04-11 PROCEDURE — 80053 COMPREHEN METABOLIC PANEL: CPT | Performed by: FAMILY MEDICINE

## 2019-04-11 PROCEDURE — 85025 COMPLETE CBC W/AUTO DIFF WBC: CPT

## 2019-05-02 ENCOUNTER — LAB (OUTPATIENT)
Dept: LAB | Facility: HOSPITAL | Age: 74
End: 2019-05-02

## 2019-05-02 ENCOUNTER — TRANSCRIBE ORDERS (OUTPATIENT)
Dept: LAB | Facility: HOSPITAL | Age: 74
End: 2019-05-02

## 2019-05-02 DIAGNOSIS — D50.9 IRON DEFICIENCY ANEMIA, UNSPECIFIED IRON DEFICIENCY ANEMIA TYPE: ICD-10-CM

## 2019-05-02 DIAGNOSIS — I48.91 ATRIAL FIBRILLATION, UNSPECIFIED TYPE (HCC): ICD-10-CM

## 2019-05-02 DIAGNOSIS — N18.4 CHRONIC KIDNEY DISEASE, STAGE IV (SEVERE) (HCC): ICD-10-CM

## 2019-05-02 DIAGNOSIS — N18.4 CHRONIC KIDNEY DISEASE, STAGE IV (SEVERE) (HCC): Primary | ICD-10-CM

## 2019-05-02 LAB
ALBUMIN SERPL-MCNC: 4 G/DL (ref 3.5–5)
ANION GAP SERPL CALCULATED.3IONS-SCNC: 12.7 MMOL/L (ref 10–20)
BASOPHILS # BLD AUTO: 0.02 10*3/MM3 (ref 0–0.2)
BASOPHILS NFR BLD AUTO: 0.4 % (ref 0–1.5)
BUN BLD-MCNC: 28 MG/DL (ref 7–20)
BUN/CREAT SERPL: 15.6 (ref 7.1–23.5)
CALCIUM SPEC-SCNC: 10.2 MG/DL (ref 8.4–10.2)
CHLORIDE SERPL-SCNC: 108 MMOL/L (ref 98–107)
CO2 SERPL-SCNC: 24 MMOL/L (ref 26–30)
CREAT BLD-MCNC: 1.8 MG/DL (ref 0.6–1.3)
DEPRECATED RDW RBC AUTO: 50.5 FL (ref 37–54)
EOSINOPHIL # BLD AUTO: 0.04 10*3/MM3 (ref 0–0.4)
EOSINOPHIL NFR BLD AUTO: 0.7 % (ref 0.3–6.2)
ERYTHROCYTE [DISTWIDTH] IN BLOOD BY AUTOMATED COUNT: 13.8 % (ref 12.3–15.4)
FERRITIN SERPL-MCNC: 60 NG/ML (ref 11.1–264)
GFR SERPL CREATININE-BSD FRML MDRD: 28 ML/MIN/1.73
GLUCOSE BLD-MCNC: 103 MG/DL (ref 74–98)
HCT VFR BLD AUTO: 27.8 % (ref 34–46.6)
HGB BLD-MCNC: 8.7 G/DL (ref 12–15.9)
IMM GRANULOCYTES # BLD AUTO: 0.02 10*3/MM3 (ref 0–0.05)
IMM GRANULOCYTES NFR BLD AUTO: 0.4 % (ref 0–0.5)
INR PPP: 1.94 (ref 0.9–1.1)
IRON 24H UR-MRATE: 43 MCG/DL (ref 37–181)
IRON SATN MFR SERPL: 13 % (ref 11–46)
LYMPHOCYTES # BLD AUTO: 1.04 10*3/MM3 (ref 0.7–3.1)
LYMPHOCYTES NFR BLD AUTO: 19.3 % (ref 19.6–45.3)
MCH RBC QN AUTO: 31.3 PG (ref 26.6–33)
MCHC RBC AUTO-ENTMCNC: 31.3 G/DL (ref 31.5–35.7)
MCV RBC AUTO: 100 FL (ref 79–97)
MONOCYTES # BLD AUTO: 0.41 10*3/MM3 (ref 0.1–0.9)
MONOCYTES NFR BLD AUTO: 7.6 % (ref 5–12)
NEUTROPHILS # BLD AUTO: 3.85 10*3/MM3 (ref 1.7–7)
NEUTROPHILS NFR BLD AUTO: 71.6 % (ref 42.7–76)
NRBC BLD AUTO-RTO: 0 /100 WBC (ref 0–0.2)
PHOSPHATE SERPL-MCNC: 4 MG/DL (ref 2.5–4.5)
PLATELET # BLD AUTO: 199 10*3/MM3 (ref 140–450)
PMV BLD AUTO: 10.7 FL (ref 6–12)
POTASSIUM BLD-SCNC: 4.7 MMOL/L (ref 3.5–5.1)
PROTHROMBIN TIME: 22.6 SECONDS (ref 12–15.1)
RBC # BLD AUTO: 2.78 10*6/MM3 (ref 3.77–5.28)
SODIUM BLD-SCNC: 140 MMOL/L (ref 137–145)
TIBC SERPL-MCNC: 329 MCG/DL (ref 261–497)
WBC NRBC COR # BLD: 5.38 10*3/MM3 (ref 3.4–10.8)

## 2019-05-02 PROCEDURE — 85025 COMPLETE CBC W/AUTO DIFF WBC: CPT

## 2019-05-02 PROCEDURE — 83550 IRON BINDING TEST: CPT

## 2019-05-02 PROCEDURE — 83540 ASSAY OF IRON: CPT

## 2019-05-02 PROCEDURE — 82728 ASSAY OF FERRITIN: CPT

## 2019-05-02 PROCEDURE — 85610 PROTHROMBIN TIME: CPT

## 2019-05-02 PROCEDURE — 80069 RENAL FUNCTION PANEL: CPT

## 2019-05-02 PROCEDURE — 36415 COLL VENOUS BLD VENIPUNCTURE: CPT

## 2019-05-24 ENCOUNTER — APPOINTMENT (OUTPATIENT)
Dept: GENERAL RADIOLOGY | Facility: HOSPITAL | Age: 74
End: 2019-05-24

## 2019-05-24 ENCOUNTER — HOSPITAL ENCOUNTER (INPATIENT)
Facility: HOSPITAL | Age: 74
LOS: 3 days | Discharge: HOME | End: 2019-05-27
Attending: EMERGENCY MEDICINE | Admitting: INTERNAL MEDICINE

## 2019-05-24 DIAGNOSIS — Z74.09 IMPAIRED FUNCTIONAL MOBILITY, BALANCE, GAIT, AND ENDURANCE: ICD-10-CM

## 2019-05-24 DIAGNOSIS — N18.9 ACUTE RENAL FAILURE SUPERIMPOSED ON CHRONIC KIDNEY DISEASE, UNSPECIFIED CKD STAGE, UNSPECIFIED ACUTE RENAL FAILURE TYPE (HCC): Primary | ICD-10-CM

## 2019-05-24 DIAGNOSIS — N17.9 ACUTE RENAL FAILURE SUPERIMPOSED ON CHRONIC KIDNEY DISEASE, UNSPECIFIED CKD STAGE, UNSPECIFIED ACUTE RENAL FAILURE TYPE (HCC): Primary | ICD-10-CM

## 2019-05-24 PROBLEM — I48.0 PAROXYSMAL ATRIAL FIBRILLATION (HCC): Status: ACTIVE | Noted: 2017-03-14

## 2019-05-24 PROBLEM — F02.80 DEMENTIA OF THE ALZHEIMER'S TYPE WITH LATE ONSET WITHOUT BEHAVIORAL DISTURBANCE (HCC): Status: ACTIVE | Noted: 2019-05-24

## 2019-05-24 PROBLEM — E87.1 HYPONATREMIA: Status: ACTIVE | Noted: 2019-05-24

## 2019-05-24 PROBLEM — D63.8 ANEMIA, CHRONIC DISEASE: Status: ACTIVE | Noted: 2019-05-24

## 2019-05-24 PROBLEM — I10 ESSENTIAL HYPERTENSION: Status: ACTIVE | Noted: 2019-05-24

## 2019-05-24 PROBLEM — G30.1 DEMENTIA OF THE ALZHEIMER'S TYPE WITH LATE ONSET WITHOUT BEHAVIORAL DISTURBANCE (HCC): Status: ACTIVE | Noted: 2019-05-24

## 2019-05-24 LAB
ALBUMIN SERPL-MCNC: 4.1 G/DL (ref 3.5–5)
ALBUMIN/GLOB SERPL: 1.6 G/DL (ref 1–2)
ALP SERPL-CCNC: 59 U/L (ref 38–126)
ALT SERPL W P-5'-P-CCNC: 26 U/L (ref 13–69)
ANION GAP SERPL CALCULATED.3IONS-SCNC: 17.2 MMOL/L (ref 10–20)
AST SERPL-CCNC: 26 U/L (ref 15–46)
BASOPHILS # BLD AUTO: 0.03 10*3/MM3 (ref 0–0.2)
BASOPHILS NFR BLD AUTO: 0.5 % (ref 0–1.5)
BILIRUB SERPL-MCNC: 0.5 MG/DL (ref 0.2–1.3)
BILIRUB UR QL STRIP: NEGATIVE
BUN BLD-MCNC: 78 MG/DL (ref 7–20)
BUN/CREAT SERPL: 18.1 (ref 7.1–23.5)
CALCIUM SPEC-SCNC: 9.3 MG/DL (ref 8.4–10.2)
CHLORIDE SERPL-SCNC: 93 MMOL/L (ref 98–107)
CLARITY UR: CLEAR
CO2 SERPL-SCNC: 27 MMOL/L (ref 26–30)
COLOR UR: YELLOW
CREAT BLD-MCNC: 4.3 MG/DL (ref 0.6–1.3)
DEPRECATED RDW RBC AUTO: 44.8 FL (ref 37–54)
EOSINOPHIL # BLD AUTO: 0.03 10*3/MM3 (ref 0–0.4)
EOSINOPHIL NFR BLD AUTO: 0.5 % (ref 0.3–6.2)
ERYTHROCYTE [DISTWIDTH] IN BLOOD BY AUTOMATED COUNT: 13.2 % (ref 12.3–15.4)
GFR SERPL CREATININE-BSD FRML MDRD: 10 ML/MIN/1.73
GFR SERPL CREATININE-BSD FRML MDRD: ABNORMAL ML/MIN/1.73
GLOBULIN UR ELPH-MCNC: 2.5 GM/DL
GLUCOSE BLD-MCNC: 107 MG/DL (ref 74–98)
GLUCOSE UR STRIP-MCNC: NEGATIVE MG/DL
HCT VFR BLD AUTO: 24.7 % (ref 34–46.6)
HGB BLD-MCNC: 8.1 G/DL (ref 12–15.9)
HGB UR QL STRIP.AUTO: NEGATIVE
IMM GRANULOCYTES # BLD AUTO: 0.02 10*3/MM3 (ref 0–0.05)
IMM GRANULOCYTES NFR BLD AUTO: 0.3 % (ref 0–0.5)
INR PPP: 2.29 (ref 0.9–1.1)
KETONES UR QL STRIP: NEGATIVE
LEUKOCYTE ESTERASE UR QL STRIP.AUTO: NEGATIVE
LYMPHOCYTES # BLD AUTO: 1.14 10*3/MM3 (ref 0.7–3.1)
LYMPHOCYTES NFR BLD AUTO: 18.6 % (ref 19.6–45.3)
MCH RBC QN AUTO: 30.5 PG (ref 26.6–33)
MCHC RBC AUTO-ENTMCNC: 32.8 G/DL (ref 31.5–35.7)
MCV RBC AUTO: 92.9 FL (ref 79–97)
MONOCYTES # BLD AUTO: 0.45 10*3/MM3 (ref 0.1–0.9)
MONOCYTES NFR BLD AUTO: 7.3 % (ref 5–12)
NEUTROPHILS # BLD AUTO: 4.47 10*3/MM3 (ref 1.7–7)
NEUTROPHILS NFR BLD AUTO: 72.8 % (ref 42.7–76)
NITRITE UR QL STRIP: NEGATIVE
NRBC BLD AUTO-RTO: 0 /100 WBC (ref 0–0.2)
NT-PROBNP SERPL-MCNC: 2460 PG/ML (ref 0–125)
PH UR STRIP.AUTO: 6 [PH] (ref 5–8)
PLATELET # BLD AUTO: 187 10*3/MM3 (ref 140–450)
PMV BLD AUTO: 10.9 FL (ref 6–12)
POTASSIUM BLD-SCNC: 4.2 MMOL/L (ref 3.5–5.1)
PROT SERPL-MCNC: 6.6 G/DL (ref 6.3–8.2)
PROT UR QL STRIP: NEGATIVE
PROTHROMBIN TIME: 25.8 SECONDS (ref 12–15.1)
RBC # BLD AUTO: 2.66 10*6/MM3 (ref 3.77–5.28)
SODIUM BLD-SCNC: 133 MMOL/L (ref 137–145)
SP GR UR STRIP: 1.01 (ref 1–1.03)
TROPONIN I SERPL-MCNC: <0.012 NG/ML (ref 0–0.03)
UROBILINOGEN UR QL STRIP: NORMAL
WBC NRBC COR # BLD: 6.14 10*3/MM3 (ref 3.4–10.8)

## 2019-05-24 PROCEDURE — 80053 COMPREHEN METABOLIC PANEL: CPT | Performed by: NURSE PRACTITIONER

## 2019-05-24 PROCEDURE — 85610 PROTHROMBIN TIME: CPT | Performed by: NURSE PRACTITIONER

## 2019-05-24 PROCEDURE — 99223 1ST HOSP IP/OBS HIGH 75: CPT | Performed by: INTERNAL MEDICINE

## 2019-05-24 PROCEDURE — 71046 X-RAY EXAM CHEST 2 VIEWS: CPT

## 2019-05-24 PROCEDURE — 83880 ASSAY OF NATRIURETIC PEPTIDE: CPT | Performed by: NURSE PRACTITIONER

## 2019-05-24 PROCEDURE — 85025 COMPLETE CBC W/AUTO DIFF WBC: CPT | Performed by: NURSE PRACTITIONER

## 2019-05-24 PROCEDURE — 81003 URINALYSIS AUTO W/O SCOPE: CPT | Performed by: NURSE PRACTITIONER

## 2019-05-24 PROCEDURE — 93005 ELECTROCARDIOGRAM TRACING: CPT | Performed by: NURSE PRACTITIONER

## 2019-05-24 PROCEDURE — 99284 EMERGENCY DEPT VISIT MOD MDM: CPT

## 2019-05-24 PROCEDURE — 84484 ASSAY OF TROPONIN QUANT: CPT | Performed by: NURSE PRACTITIONER

## 2019-05-24 RX ORDER — ONDANSETRON 2 MG/ML
4 INJECTION INTRAMUSCULAR; INTRAVENOUS EVERY 6 HOURS PRN
Status: DISCONTINUED | OUTPATIENT
Start: 2019-05-24 | End: 2019-05-27 | Stop reason: HOSPADM

## 2019-05-24 RX ORDER — SODIUM CHLORIDE 0.9 % (FLUSH) 0.9 %
10 SYRINGE (ML) INJECTION AS NEEDED
Status: DISCONTINUED | OUTPATIENT
Start: 2019-05-24 | End: 2019-05-27 | Stop reason: HOSPADM

## 2019-05-24 RX ORDER — HYDRALAZINE HYDROCHLORIDE 20 MG/ML
10 INJECTION INTRAMUSCULAR; INTRAVENOUS EVERY 4 HOURS PRN
Status: DISCONTINUED | OUTPATIENT
Start: 2019-05-24 | End: 2019-05-27 | Stop reason: HOSPADM

## 2019-05-24 RX ORDER — DONEPEZIL HYDROCHLORIDE 5 MG/1
10 TABLET, FILM COATED ORAL NIGHTLY
Status: DISCONTINUED | OUTPATIENT
Start: 2019-05-24 | End: 2019-05-27 | Stop reason: HOSPADM

## 2019-05-24 RX ORDER — SODIUM CHLORIDE 9 MG/ML
75 INJECTION, SOLUTION INTRAVENOUS CONTINUOUS
Status: DISCONTINUED | OUTPATIENT
Start: 2019-05-24 | End: 2019-05-26

## 2019-05-24 RX ORDER — GABAPENTIN 300 MG/1
600 CAPSULE ORAL EVERY 12 HOURS SCHEDULED
Status: DISCONTINUED | OUTPATIENT
Start: 2019-05-24 | End: 2019-05-25

## 2019-05-24 RX ORDER — ATORVASTATIN CALCIUM 40 MG/1
40 TABLET, FILM COATED ORAL NIGHTLY
Status: DISCONTINUED | OUTPATIENT
Start: 2019-05-24 | End: 2019-05-27 | Stop reason: HOSPADM

## 2019-05-24 RX ORDER — SODIUM CHLORIDE 0.9 % (FLUSH) 0.9 %
3 SYRINGE (ML) INJECTION EVERY 12 HOURS SCHEDULED
Status: DISCONTINUED | OUTPATIENT
Start: 2019-05-24 | End: 2019-05-27 | Stop reason: HOSPADM

## 2019-05-24 RX ORDER — SODIUM CHLORIDE 0.9 % (FLUSH) 0.9 %
3-10 SYRINGE (ML) INJECTION AS NEEDED
Status: DISCONTINUED | OUTPATIENT
Start: 2019-05-24 | End: 2019-05-27 | Stop reason: HOSPADM

## 2019-05-24 RX ORDER — CARVEDILOL 25 MG/1
25 TABLET ORAL 2 TIMES DAILY WITH MEALS
Status: DISCONTINUED | OUTPATIENT
Start: 2019-05-24 | End: 2019-05-25

## 2019-05-24 RX ORDER — ACETAMINOPHEN 325 MG/1
650 TABLET ORAL EVERY 4 HOURS PRN
Status: DISCONTINUED | OUTPATIENT
Start: 2019-05-24 | End: 2019-05-27 | Stop reason: HOSPADM

## 2019-05-24 RX ORDER — ASPIRIN 81 MG/1
81 TABLET ORAL DAILY
Status: DISCONTINUED | OUTPATIENT
Start: 2019-05-25 | End: 2019-05-27 | Stop reason: HOSPADM

## 2019-05-24 RX ORDER — WARFARIN SODIUM 2 MG/1
4 TABLET ORAL
Status: DISCONTINUED | OUTPATIENT
Start: 2019-05-24 | End: 2019-05-27 | Stop reason: HOSPADM

## 2019-05-24 RX ORDER — HYDRALAZINE HYDROCHLORIDE 25 MG/1
25 TABLET, FILM COATED ORAL 2 TIMES DAILY
Status: DISCONTINUED | OUTPATIENT
Start: 2019-05-24 | End: 2019-05-27 | Stop reason: HOSPADM

## 2019-05-24 RX ADMIN — SODIUM CHLORIDE, PRESERVATIVE FREE 3 ML: 5 INJECTION INTRAVENOUS at 21:16

## 2019-05-24 RX ADMIN — GABAPENTIN 600 MG: 300 CAPSULE ORAL at 21:16

## 2019-05-24 RX ADMIN — DONEPEZIL HYDROCHLORIDE 10 MG: 5 TABLET ORAL at 21:16

## 2019-05-24 RX ADMIN — SODIUM CHLORIDE 1000 ML: 9 INJECTION, SOLUTION INTRAVENOUS at 16:33

## 2019-05-24 RX ADMIN — ATORVASTATIN CALCIUM 40 MG: 40 TABLET, FILM COATED ORAL at 21:16

## 2019-05-24 RX ADMIN — SODIUM CHLORIDE 50 ML/HR: 9 INJECTION, SOLUTION INTRAVENOUS at 19:00

## 2019-05-25 LAB
ANION GAP SERPL CALCULATED.3IONS-SCNC: 15.2 MMOL/L (ref 10–20)
BASOPHILS # BLD AUTO: 0.03 10*3/MM3 (ref 0–0.2)
BASOPHILS NFR BLD AUTO: 0.6 % (ref 0–1.5)
BUN BLD-MCNC: 75 MG/DL (ref 7–20)
BUN/CREAT SERPL: 19.7 (ref 7.1–23.5)
CALCIUM SPEC-SCNC: 9.1 MG/DL (ref 8.4–10.2)
CHLORIDE SERPL-SCNC: 98 MMOL/L (ref 98–107)
CO2 SERPL-SCNC: 26 MMOL/L (ref 26–30)
CREAT BLD-MCNC: 3.8 MG/DL (ref 0.6–1.3)
DEPRECATED RDW RBC AUTO: 45.7 FL (ref 37–54)
EOSINOPHIL # BLD AUTO: 0.06 10*3/MM3 (ref 0–0.4)
EOSINOPHIL NFR BLD AUTO: 1.2 % (ref 0.3–6.2)
ERYTHROCYTE [DISTWIDTH] IN BLOOD BY AUTOMATED COUNT: 13.2 % (ref 12.3–15.4)
GFR SERPL CREATININE-BSD FRML MDRD: 12 ML/MIN/1.73
GFR SERPL CREATININE-BSD FRML MDRD: ABNORMAL ML/MIN/1.73
GLUCOSE BLD-MCNC: 99 MG/DL (ref 74–98)
HCT VFR BLD AUTO: 25.5 % (ref 34–46.6)
HGB BLD-MCNC: 8.3 G/DL (ref 12–15.9)
IMM GRANULOCYTES # BLD AUTO: 0.02 10*3/MM3 (ref 0–0.05)
IMM GRANULOCYTES NFR BLD AUTO: 0.4 % (ref 0–0.5)
INR PPP: 2.41 (ref 0.9–1.1)
LYMPHOCYTES # BLD AUTO: 1.49 10*3/MM3 (ref 0.7–3.1)
LYMPHOCYTES NFR BLD AUTO: 29.3 % (ref 19.6–45.3)
MCH RBC QN AUTO: 30.9 PG (ref 26.6–33)
MCHC RBC AUTO-ENTMCNC: 32.5 G/DL (ref 31.5–35.7)
MCV RBC AUTO: 94.8 FL (ref 79–97)
MONOCYTES # BLD AUTO: 0.44 10*3/MM3 (ref 0.1–0.9)
MONOCYTES NFR BLD AUTO: 8.6 % (ref 5–12)
NEUTROPHILS # BLD AUTO: 3.05 10*3/MM3 (ref 1.7–7)
NEUTROPHILS NFR BLD AUTO: 59.9 % (ref 42.7–76)
NRBC BLD AUTO-RTO: 0 /100 WBC (ref 0–0.2)
PLATELET # BLD AUTO: 174 10*3/MM3 (ref 140–450)
PMV BLD AUTO: 10.7 FL (ref 6–12)
POTASSIUM BLD-SCNC: 4.2 MMOL/L (ref 3.5–5.1)
PROTHROMBIN TIME: 26.8 SECONDS (ref 12–15.1)
RBC # BLD AUTO: 2.69 10*6/MM3 (ref 3.77–5.28)
SODIUM BLD-SCNC: 135 MMOL/L (ref 137–145)
WBC NRBC COR # BLD: 5.09 10*3/MM3 (ref 3.4–10.8)

## 2019-05-25 PROCEDURE — 97161 PT EVAL LOW COMPLEX 20 MIN: CPT

## 2019-05-25 PROCEDURE — 80048 BASIC METABOLIC PNL TOTAL CA: CPT | Performed by: INTERNAL MEDICINE

## 2019-05-25 PROCEDURE — 99233 SBSQ HOSP IP/OBS HIGH 50: CPT | Performed by: INTERNAL MEDICINE

## 2019-05-25 PROCEDURE — 85025 COMPLETE CBC W/AUTO DIFF WBC: CPT | Performed by: INTERNAL MEDICINE

## 2019-05-25 PROCEDURE — 85610 PROTHROMBIN TIME: CPT | Performed by: INTERNAL MEDICINE

## 2019-05-25 RX ORDER — CARVEDILOL 12.5 MG/1
12.5 TABLET ORAL 2 TIMES DAILY WITH MEALS
Status: DISCONTINUED | OUTPATIENT
Start: 2019-05-25 | End: 2019-05-27 | Stop reason: HOSPADM

## 2019-05-25 RX ORDER — GABAPENTIN 300 MG/1
300 CAPSULE ORAL EVERY 12 HOURS SCHEDULED
Status: DISCONTINUED | OUTPATIENT
Start: 2019-05-25 | End: 2019-05-27 | Stop reason: HOSPADM

## 2019-05-25 RX ADMIN — DONEPEZIL HYDROCHLORIDE 10 MG: 5 TABLET ORAL at 21:43

## 2019-05-25 RX ADMIN — WARFARIN SODIUM 4 MG: 2 TABLET ORAL at 17:27

## 2019-05-25 RX ADMIN — SODIUM CHLORIDE 75 ML/HR: 9 INJECTION, SOLUTION INTRAVENOUS at 12:38

## 2019-05-25 RX ADMIN — ATORVASTATIN CALCIUM 40 MG: 40 TABLET, FILM COATED ORAL at 21:42

## 2019-05-25 RX ADMIN — CARVEDILOL 12.5 MG: 12.5 TABLET, FILM COATED ORAL at 17:27

## 2019-05-25 RX ADMIN — CHOLECALCIFEROL CAP 125 MCG (5000 UNIT) 5000 UNITS: 125 CAP at 09:51

## 2019-05-25 RX ADMIN — HYDRALAZINE HYDROCHLORIDE 25 MG: 25 TABLET ORAL at 21:42

## 2019-05-25 RX ADMIN — SODIUM CHLORIDE, PRESERVATIVE FREE 3 ML: 5 INJECTION INTRAVENOUS at 09:51

## 2019-05-25 RX ADMIN — GABAPENTIN 600 MG: 300 CAPSULE ORAL at 09:51

## 2019-05-25 RX ADMIN — HYDRALAZINE HYDROCHLORIDE 25 MG: 25 TABLET ORAL at 09:51

## 2019-05-25 RX ADMIN — ASPIRIN 81 MG: 81 TABLET, COATED ORAL at 09:51

## 2019-05-25 RX ADMIN — GABAPENTIN 300 MG: 300 CAPSULE ORAL at 21:43

## 2019-05-25 RX ADMIN — SODIUM CHLORIDE 75 ML/HR: 9 INJECTION, SOLUTION INTRAVENOUS at 21:49

## 2019-05-26 LAB
ALBUMIN SERPL-MCNC: 3.8 G/DL (ref 3.5–5)
ANION GAP SERPL CALCULATED.3IONS-SCNC: 15 MMOL/L (ref 10–20)
BASOPHILS # BLD AUTO: 0.03 10*3/MM3 (ref 0–0.2)
BASOPHILS NFR BLD AUTO: 0.5 % (ref 0–1.5)
BUN BLD-MCNC: 68 MG/DL (ref 7–20)
BUN/CREAT SERPL: 25.2 (ref 7.1–23.5)
CALCIUM SPEC-SCNC: 9.4 MG/DL (ref 8.4–10.2)
CHLORIDE SERPL-SCNC: 104 MMOL/L (ref 98–107)
CO2 SERPL-SCNC: 24 MMOL/L (ref 26–30)
CREAT BLD-MCNC: 2.7 MG/DL (ref 0.6–1.3)
DEPRECATED RDW RBC AUTO: 45.6 FL (ref 37–54)
EOSINOPHIL # BLD AUTO: 0.05 10*3/MM3 (ref 0–0.4)
EOSINOPHIL NFR BLD AUTO: 0.9 % (ref 0.3–6.2)
ERYTHROCYTE [DISTWIDTH] IN BLOOD BY AUTOMATED COUNT: 13.2 % (ref 12.3–15.4)
GFR SERPL CREATININE-BSD FRML MDRD: 17 ML/MIN/1.73
GLUCOSE BLD-MCNC: 94 MG/DL (ref 74–98)
HCT VFR BLD AUTO: 27.1 % (ref 34–46.6)
HGB BLD-MCNC: 8.8 G/DL (ref 12–15.9)
IMM GRANULOCYTES # BLD AUTO: 0.02 10*3/MM3 (ref 0–0.05)
IMM GRANULOCYTES NFR BLD AUTO: 0.4 % (ref 0–0.5)
LYMPHOCYTES # BLD AUTO: 1.36 10*3/MM3 (ref 0.7–3.1)
LYMPHOCYTES NFR BLD AUTO: 24.8 % (ref 19.6–45.3)
MAGNESIUM SERPL-MCNC: 2.4 MG/DL (ref 1.6–2.3)
MCH RBC QN AUTO: 30.9 PG (ref 26.6–33)
MCHC RBC AUTO-ENTMCNC: 32.5 G/DL (ref 31.5–35.7)
MCV RBC AUTO: 95.1 FL (ref 79–97)
MONOCYTES # BLD AUTO: 0.39 10*3/MM3 (ref 0.1–0.9)
MONOCYTES NFR BLD AUTO: 7.1 % (ref 5–12)
NEUTROPHILS # BLD AUTO: 3.64 10*3/MM3 (ref 1.7–7)
NEUTROPHILS NFR BLD AUTO: 66.3 % (ref 42.7–76)
NRBC BLD AUTO-RTO: 0 /100 WBC (ref 0–0.2)
PHOSPHATE SERPL-MCNC: 4 MG/DL (ref 2.5–4.5)
PLATELET # BLD AUTO: 178 10*3/MM3 (ref 140–450)
PMV BLD AUTO: 10.9 FL (ref 6–12)
POTASSIUM BLD-SCNC: 4 MMOL/L (ref 3.5–5.1)
RBC # BLD AUTO: 2.85 10*6/MM3 (ref 3.77–5.28)
SODIUM BLD-SCNC: 139 MMOL/L (ref 137–145)
WBC NRBC COR # BLD: 5.49 10*3/MM3 (ref 3.4–10.8)

## 2019-05-26 PROCEDURE — 80069 RENAL FUNCTION PANEL: CPT | Performed by: INTERNAL MEDICINE

## 2019-05-26 PROCEDURE — 99232 SBSQ HOSP IP/OBS MODERATE 35: CPT | Performed by: INTERNAL MEDICINE

## 2019-05-26 PROCEDURE — 85025 COMPLETE CBC W/AUTO DIFF WBC: CPT | Performed by: INTERNAL MEDICINE

## 2019-05-26 PROCEDURE — 83735 ASSAY OF MAGNESIUM: CPT | Performed by: INTERNAL MEDICINE

## 2019-05-26 PROCEDURE — 97116 GAIT TRAINING THERAPY: CPT

## 2019-05-26 PROCEDURE — 97530 THERAPEUTIC ACTIVITIES: CPT

## 2019-05-26 PROCEDURE — 97110 THERAPEUTIC EXERCISES: CPT

## 2019-05-26 RX ADMIN — ASPIRIN 81 MG: 81 TABLET, COATED ORAL at 09:04

## 2019-05-26 RX ADMIN — CHOLECALCIFEROL CAP 125 MCG (5000 UNIT) 5000 UNITS: 125 CAP at 09:03

## 2019-05-26 RX ADMIN — ATORVASTATIN CALCIUM 40 MG: 40 TABLET, FILM COATED ORAL at 20:55

## 2019-05-26 RX ADMIN — SODIUM CHLORIDE, PRESERVATIVE FREE 3 ML: 5 INJECTION INTRAVENOUS at 20:56

## 2019-05-26 RX ADMIN — WARFARIN SODIUM 4 MG: 2 TABLET ORAL at 17:06

## 2019-05-26 RX ADMIN — GABAPENTIN 300 MG: 300 CAPSULE ORAL at 20:55

## 2019-05-26 RX ADMIN — CARVEDILOL 12.5 MG: 12.5 TABLET, FILM COATED ORAL at 17:06

## 2019-05-26 RX ADMIN — CARVEDILOL 12.5 MG: 12.5 TABLET, FILM COATED ORAL at 09:00

## 2019-05-26 RX ADMIN — HYDRALAZINE HYDROCHLORIDE 25 MG: 25 TABLET ORAL at 09:04

## 2019-05-26 RX ADMIN — DONEPEZIL HYDROCHLORIDE 10 MG: 5 TABLET ORAL at 20:55

## 2019-05-26 RX ADMIN — GABAPENTIN 300 MG: 300 CAPSULE ORAL at 09:04

## 2019-05-26 RX ADMIN — HYDRALAZINE HYDROCHLORIDE 25 MG: 25 TABLET ORAL at 20:55

## 2019-05-27 VITALS
SYSTOLIC BLOOD PRESSURE: 141 MMHG | TEMPERATURE: 97.9 F | BODY MASS INDEX: 32.3 KG/M2 | OXYGEN SATURATION: 100 % | HEART RATE: 56 BPM | RESPIRATION RATE: 18 BRPM | DIASTOLIC BLOOD PRESSURE: 45 MMHG | HEIGHT: 63 IN | WEIGHT: 182.3 LBS

## 2019-05-27 LAB
ALBUMIN SERPL-MCNC: 3.6 G/DL (ref 3.5–5)
ANION GAP SERPL CALCULATED.3IONS-SCNC: 14 MMOL/L (ref 10–20)
BASOPHILS # BLD AUTO: 0.01 10*3/MM3 (ref 0–0.2)
BASOPHILS NFR BLD AUTO: 0.2 % (ref 0–1.5)
BUN BLD-MCNC: 54 MG/DL (ref 7–20)
BUN/CREAT SERPL: 27 (ref 7.1–23.5)
CALCIUM SPEC-SCNC: 9.5 MG/DL (ref 8.4–10.2)
CHLORIDE SERPL-SCNC: 106 MMOL/L (ref 98–107)
CO2 SERPL-SCNC: 25 MMOL/L (ref 26–30)
CREAT BLD-MCNC: 2 MG/DL (ref 0.6–1.3)
DEPRECATED RDW RBC AUTO: 46.5 FL (ref 37–54)
EOSINOPHIL # BLD AUTO: 0.07 10*3/MM3 (ref 0–0.4)
EOSINOPHIL NFR BLD AUTO: 1.4 % (ref 0.3–6.2)
ERYTHROCYTE [DISTWIDTH] IN BLOOD BY AUTOMATED COUNT: 13.2 % (ref 12.3–15.4)
GFR SERPL CREATININE-BSD FRML MDRD: 24 ML/MIN/1.73
GLUCOSE BLD-MCNC: 95 MG/DL (ref 74–98)
HCT VFR BLD AUTO: 26.4 % (ref 34–46.6)
HGB BLD-MCNC: 8.4 G/DL (ref 12–15.9)
IMM GRANULOCYTES # BLD AUTO: 0.02 10*3/MM3 (ref 0–0.05)
IMM GRANULOCYTES NFR BLD AUTO: 0.4 % (ref 0–0.5)
LYMPHOCYTES # BLD AUTO: 1.15 10*3/MM3 (ref 0.7–3.1)
LYMPHOCYTES NFR BLD AUTO: 22.6 % (ref 19.6–45.3)
MAGNESIUM SERPL-MCNC: 2 MG/DL (ref 1.6–2.3)
MCH RBC QN AUTO: 30.7 PG (ref 26.6–33)
MCHC RBC AUTO-ENTMCNC: 31.8 G/DL (ref 31.5–35.7)
MCV RBC AUTO: 96.4 FL (ref 79–97)
MONOCYTES # BLD AUTO: 0.44 10*3/MM3 (ref 0.1–0.9)
MONOCYTES NFR BLD AUTO: 8.6 % (ref 5–12)
NEUTROPHILS # BLD AUTO: 3.4 10*3/MM3 (ref 1.7–7)
NEUTROPHILS NFR BLD AUTO: 66.8 % (ref 42.7–76)
NRBC BLD AUTO-RTO: 0 /100 WBC (ref 0–0.2)
PHOSPHATE SERPL-MCNC: 3.7 MG/DL (ref 2.5–4.5)
PLATELET # BLD AUTO: 169 10*3/MM3 (ref 140–450)
PMV BLD AUTO: 11 FL (ref 6–12)
POTASSIUM BLD-SCNC: 4 MMOL/L (ref 3.5–5.1)
RBC # BLD AUTO: 2.74 10*6/MM3 (ref 3.77–5.28)
SODIUM BLD-SCNC: 141 MMOL/L (ref 137–145)
WBC NRBC COR # BLD: 5.09 10*3/MM3 (ref 3.4–10.8)

## 2019-05-27 PROCEDURE — 99238 HOSP IP/OBS DSCHRG MGMT 30/<: CPT | Performed by: INTERNAL MEDICINE

## 2019-05-27 PROCEDURE — 85025 COMPLETE CBC W/AUTO DIFF WBC: CPT | Performed by: INTERNAL MEDICINE

## 2019-05-27 PROCEDURE — 80069 RENAL FUNCTION PANEL: CPT | Performed by: INTERNAL MEDICINE

## 2019-05-27 PROCEDURE — 83735 ASSAY OF MAGNESIUM: CPT | Performed by: INTERNAL MEDICINE

## 2019-05-27 RX ORDER — SPIRONOLACTONE 25 MG/1
25 TABLET ORAL DAILY
Start: 2019-06-03 | End: 2020-03-11 | Stop reason: ALTCHOICE

## 2019-05-27 RX ORDER — HYDROCHLOROTHIAZIDE 25 MG/1
25 TABLET ORAL DAILY
Start: 2019-06-03 | End: 2019-06-03 | Stop reason: HOSPADM

## 2019-05-27 RX ADMIN — CHOLECALCIFEROL CAP 125 MCG (5000 UNIT) 5000 UNITS: 125 CAP at 08:32

## 2019-05-27 RX ADMIN — HYDRALAZINE HYDROCHLORIDE 25 MG: 25 TABLET ORAL at 08:32

## 2019-05-27 RX ADMIN — GABAPENTIN 300 MG: 300 CAPSULE ORAL at 08:32

## 2019-05-27 RX ADMIN — CARVEDILOL 12.5 MG: 12.5 TABLET, FILM COATED ORAL at 08:32

## 2019-05-27 RX ADMIN — SODIUM CHLORIDE, PRESERVATIVE FREE 3 ML: 5 INJECTION INTRAVENOUS at 08:33

## 2019-05-27 RX ADMIN — ASPIRIN 81 MG: 81 TABLET, COATED ORAL at 08:32

## 2019-05-28 ENCOUNTER — TELEPHONE (OUTPATIENT)
Dept: TELEMETRY | Facility: HOSPITAL | Age: 74
End: 2019-05-28

## 2019-05-28 ENCOUNTER — READMISSION MANAGEMENT (OUTPATIENT)
Dept: CALL CENTER | Facility: HOSPITAL | Age: 74
End: 2019-05-28

## 2019-05-28 NOTE — OUTREACH NOTE
Prep Survey      Responses   Facility patient discharged from?  [place]   Is patient eligible?  Yes   Discharge diagnosis  ARF d/t use of diuretic and ARB, CKI IV, Chronic diastolic HF no AE, SHAYY on CPAP, PAF on Coumadin, essential HTN, Alzheimer's dementia,, anemia of CKD, hyponatremia   Does the patient have one of the following disease processes/diagnoses(primary or secondary)?  Other   Does the patient have Home health ordered?  No   Is there a DME ordered?  No   Comments regarding appointments  Pt to schedule follow up appointments.    Prep survey completed?  Yes          Edith Bowden RN

## 2019-05-30 ENCOUNTER — READMISSION MANAGEMENT (OUTPATIENT)
Dept: CALL CENTER | Facility: HOSPITAL | Age: 74
End: 2019-05-30

## 2019-05-30 PROBLEM — D50.9 IRON DEFICIENCY ANEMIA, UNSPECIFIED: Status: ACTIVE | Noted: 2019-05-30

## 2019-05-30 NOTE — OUTREACH NOTE
Medical Week 1 Survey      Responses   Facility patient discharged from?  [facility]   Does the patient have one of the following disease processes/diagnoses(primary or secondary)?  Other   Is there a successful TCM telephone encounter documented?  No   Week 1 attempt successful?  No   Unsuccessful attempts  Attempt 1          Katja Tse RN

## 2019-05-31 ENCOUNTER — HOSPITAL ENCOUNTER (OUTPATIENT)
Dept: INFUSION THERAPY | Facility: HOSPITAL | Age: 74
Setting detail: INFUSION SERIES
Discharge: HOME | End: 2019-05-31

## 2019-05-31 ENCOUNTER — READMISSION MANAGEMENT (OUTPATIENT)
Dept: CALL CENTER | Facility: HOSPITAL | Age: 74
End: 2019-05-31

## 2019-05-31 VITALS
BODY MASS INDEX: 27.82 KG/M2 | SYSTOLIC BLOOD PRESSURE: 155 MMHG | RESPIRATION RATE: 18 BRPM | WEIGHT: 157 LBS | DIASTOLIC BLOOD PRESSURE: 61 MMHG | TEMPERATURE: 97.8 F | HEIGHT: 63 IN | HEART RATE: 56 BPM | OXYGEN SATURATION: 100 %

## 2019-05-31 DIAGNOSIS — D50.9 IRON DEFICIENCY ANEMIA, UNSPECIFIED IRON DEFICIENCY ANEMIA TYPE: Primary | ICD-10-CM

## 2019-05-31 DIAGNOSIS — N18.4 CHRONIC KIDNEY DISEASE, STAGE IV (SEVERE) (HCC): ICD-10-CM

## 2019-05-31 PROCEDURE — 25010000002 FERRIC CARBOXYMALTOSE 750 MG/15ML SOLUTION 15 ML VIAL: Performed by: PHYSICIAN ASSISTANT

## 2019-05-31 PROCEDURE — 96365 THER/PROPH/DIAG IV INF INIT: CPT

## 2019-05-31 RX ORDER — SODIUM CHLORIDE 9 MG/ML
250 INJECTION, SOLUTION INTRAVENOUS ONCE
Status: DISCONTINUED | OUTPATIENT
Start: 2019-05-31 | End: 2019-06-02 | Stop reason: HOSPADM

## 2019-05-31 RX ORDER — SODIUM CHLORIDE 9 MG/ML
250 INJECTION, SOLUTION INTRAVENOUS ONCE
Status: CANCELLED | OUTPATIENT
Start: 2019-05-31 | End: 2019-05-31

## 2019-05-31 RX ADMIN — FERRIC CARBOXYMALTOSE INJECTION 750 MG: 50 INJECTION, SOLUTION INTRAVENOUS at 11:34

## 2019-05-31 NOTE — PATIENT INSTRUCTIONS
Ferric carboxymaltose injection  What is this medicine?  FERRIC CARBOXYMALTOSE (ferr-ik car-box-ee-mol-toes) is an iron complex. Iron is used to make healthy red blood cells, which carry oxygen and nutrients throughout the body. This medicine is used to treat anemia in people with chronic kidney disease or people who cannot take iron by mouth.  This medicine may be used for other purposes; ask your health care provider or pharmacist if you have questions.  COMMON BRAND NAME(S): Injectafer  What should I tell my health care provider before I take this medicine?  They need to know if you have any of these conditions:  -high levels of iron in the blood  -liver disease  -an unusual or allergic reaction to iron, other medicines, foods, dyes, or preservatives  -pregnant or trying to get pregnant  -breast-feeding  How should I use this medicine?  This medicine is for infusion into a vein. It is given by a health care professional in a hospital or clinic setting.  Talk to your pediatrician regarding the use of this medicine in children. Special care may be needed.  Overdosage: If you think you have taken too much of this medicine contact a poison control center or emergency room at once.  NOTE: This medicine is only for you. Do not share this medicine with others.  What if I miss a dose?  It is important not to miss your dose. Call your doctor or health care professional if you are unable to keep an appointment.  What may interact with this medicine?  Do not take this medicine with any of the following medications:  -deferoxamine  -dimercaprol  -other iron products  This list may not describe all possible interactions. Give your health care provider a list of all the medicines, herbs, non-prescription drugs, or dietary supplements you use. Also tell them if you smoke, drink alcohol, or use illegal drugs. Some items may interact with your medicine.  What should I watch for while using this medicine?  Visit your doctor or  health care professional regularly. Tell your doctor if your symptoms do not start to get better or if they get worse. You may need blood work done while you are taking this medicine.  You may need to follow a special diet. Talk to your doctor. Foods that contain iron include: whole grains/cereals, dried fruits, beans, or peas, leafy green vegetables, and organ meats (liver, kidney).  What side effects may I notice from receiving this medicine?  Side effects that you should report to your doctor or health care professional as soon as possible:  -allergic reactions like skin rash, itching or hives, swelling of the face, lips, or tongue  -dizziness  -facial flushing  Side effects that usually do not require medical attention (report to your doctor or health care professional if they continue or are bothersome):  -changes in taste  -constipation  -headache  -nausea, vomiting  -pain, redness, or irritation at site where injected  This list may not describe all possible side effects. Call your doctor for medical advice about side effects. You may report side effects to FDA at 4-100-FDA-0060.  Where should I keep my medicine?  This drug is given in a hospital or clinic and will not be stored at home.  NOTE: This sheet is a summary. It may not cover all possible information. If you have questions about this medicine, talk to your doctor, pharmacist, or health care provider.  © 2019 [employer]/Gold Standard (2018-02-01 09:40:29)

## 2019-05-31 NOTE — OUTREACH NOTE
Medical Week 1 Survey      Responses   Facility patient discharged from?  [facility]   Does the patient have one of the following disease processes/diagnoses(primary or secondary)?  Other   Is there a successful TCM telephone encounter documented?  No   Week 1 attempt successful?  No   Unsuccessful attempts  Attempt 2          Tabitha Puckett RN

## 2019-06-03 ENCOUNTER — OFFICE VISIT (OUTPATIENT)
Dept: ONCOLOGY | Facility: CLINIC | Age: 74
End: 2019-06-03

## 2019-06-03 VITALS
TEMPERATURE: 97.5 F | WEIGHT: 177 LBS | HEART RATE: 51 BPM | SYSTOLIC BLOOD PRESSURE: 141 MMHG | RESPIRATION RATE: 19 BRPM | HEIGHT: 63 IN | DIASTOLIC BLOOD PRESSURE: 63 MMHG | BODY MASS INDEX: 31.36 KG/M2

## 2019-06-03 DIAGNOSIS — C50.411 MALIGNANT NEOPLASM OF UPPER-OUTER QUADRANT OF RIGHT BREAST IN FEMALE, ESTROGEN RECEPTOR POSITIVE (HCC): Primary | ICD-10-CM

## 2019-06-03 DIAGNOSIS — S51.812A SKIN TEAR OF LEFT FOREARM WITHOUT COMPLICATION, INITIAL ENCOUNTER: ICD-10-CM

## 2019-06-03 DIAGNOSIS — Z17.0 MALIGNANT NEOPLASM OF UPPER-OUTER QUADRANT OF RIGHT BREAST IN FEMALE, ESTROGEN RECEPTOR POSITIVE (HCC): Primary | ICD-10-CM

## 2019-06-03 PROCEDURE — 99213 OFFICE O/P EST LOW 20 MIN: CPT | Performed by: NURSE PRACTITIONER

## 2019-06-05 ENCOUNTER — READMISSION MANAGEMENT (OUTPATIENT)
Dept: CALL CENTER | Facility: HOSPITAL | Age: 74
End: 2019-06-05

## 2019-06-05 NOTE — OUTREACH NOTE
Medical Week 2 Survey      Responses   Facility patient discharged from?  [facility]   Does the patient have one of the following disease processes/diagnoses(primary or secondary)?  Other   Week 2 attempt successful?  Yes   Call start time  1035   Discharge diagnosis  ARF d/t use of diuretic and ARB, CKI IV, Chronic diastolic HF no AE, SHAYY on CPAP, PAF on Coumadin, essential HTN, Alzheimer's dementia,, anemia of CKD, hyponatremia   Call end time  1044   Meds reviewed with patient/caregiver?  Yes   Is the patient taking all medications as directed (includes completed medication regime)?  Yes   Does the patient have a primary care provider?   Yes   Does the patient have an appointment with their PCP within 7 days of discharge?  Yes   Has the patient kept scheduled appointments due by today?  Yes   Has home health visited the patient within 72 hours of discharge?  N/A   Psychosocial issues?  No   Did the patient receive a copy of their discharge instructions?  Yes   Nursing interventions  Reviewed instructions with patient   What is the patient's perception of their health status since discharge?  New symptoms unrelated to diagnosis   Is the patient/caregiver able to teach back signs and symptoms related to disease process for when to call PCP?  Yes   Is the patient/caregiver able to teach back signs and symptoms related to disease process for when to call 911?  Yes   Is the patient/caregiver able to teach back the hierarchy of who to call/visit for symptoms/problems? PCP, Specialist, Home health nurse, Urgent Care, ED, 911  Yes   Additional teach back comments  Pt had issues from IV--arm was red and bruised. She went to --she is changing the dressing to the site.   Week 2 Call Completed?  Yes          Cindy Siddiqui RN

## 2019-06-07 ENCOUNTER — HOSPITAL ENCOUNTER (OUTPATIENT)
Dept: INFUSION THERAPY | Facility: HOSPITAL | Age: 74
Discharge: HOME | End: 2019-06-07
Admitting: PHYSICIAN ASSISTANT

## 2019-06-07 VITALS
SYSTOLIC BLOOD PRESSURE: 141 MMHG | DIASTOLIC BLOOD PRESSURE: 65 MMHG | OXYGEN SATURATION: 100 % | TEMPERATURE: 97.7 F | RESPIRATION RATE: 18 BRPM | HEART RATE: 51 BPM

## 2019-06-07 DIAGNOSIS — N18.4 CHRONIC KIDNEY DISEASE, STAGE IV (SEVERE) (HCC): ICD-10-CM

## 2019-06-07 DIAGNOSIS — D50.9 IRON DEFICIENCY ANEMIA, UNSPECIFIED IRON DEFICIENCY ANEMIA TYPE: Primary | ICD-10-CM

## 2019-06-07 PROCEDURE — 96365 THER/PROPH/DIAG IV INF INIT: CPT

## 2019-06-07 PROCEDURE — 96374 THER/PROPH/DIAG INJ IV PUSH: CPT

## 2019-06-07 PROCEDURE — 25010000002 FERRIC CARBOXYMALTOSE 750 MG/15ML SOLUTION 15 ML VIAL: Performed by: PHYSICIAN ASSISTANT

## 2019-06-07 RX ORDER — SODIUM CHLORIDE 9 MG/ML
250 INJECTION, SOLUTION INTRAVENOUS ONCE
Status: DISCONTINUED | OUTPATIENT
Start: 2019-06-07 | End: 2019-06-09 | Stop reason: HOSPADM

## 2019-06-07 RX ORDER — SODIUM CHLORIDE 9 MG/ML
250 INJECTION, SOLUTION INTRAVENOUS ONCE
Status: CANCELLED | OUTPATIENT
Start: 2019-06-07 | End: 2019-06-07

## 2019-06-07 RX ADMIN — FERRIC CARBOXYMALTOSE INJECTION 750 MG: 50 INJECTION, SOLUTION INTRAVENOUS at 11:44

## 2019-06-12 ENCOUNTER — READMISSION MANAGEMENT (OUTPATIENT)
Dept: CALL CENTER | Facility: HOSPITAL | Age: 74
End: 2019-06-12

## 2019-06-12 NOTE — OUTREACH NOTE
Medical Week 3 Survey      Responses   Facility [facility] discharged from?  [facility]   Does the patient have one of the following disease processes/diagnoses(primary or secondary)?  Other   Week 3 attempt successful?  No   Unsuccessful attempts  Attempt 1          Lillian Aguilar RN

## 2019-06-13 ENCOUNTER — READMISSION MANAGEMENT (OUTPATIENT)
Dept: CALL CENTER | Facility: HOSPITAL | Age: 74
End: 2019-06-13

## 2019-06-13 NOTE — OUTREACH NOTE
"Medical Week 3 Survey      Responses   Facility patient discharged from?  [place]   Does the patient have one of the following disease processes/diagnoses(primary or secondary)?  Other   Week 3 attempt successful?  Yes   Call start time  1415   Call end time  1416   Discharge diagnosis  ARF d/t use of diuretic and ARB, CKI IV, Chronic diastolic HF no AE, SHAYY on CPAP, PAF on Coumadin, essential HTN, Alzheimer's dementia,, anemia of CKD, hyponatremia   Is patient permission given to speak with other caregiver?  No   Meds reviewed with patient/caregiver?  Yes   Is the patient taking all medications as directed (includes completed medication regime)?  Yes   Has the patient kept scheduled appointments due by today?  Yes   DME comments  uses CPAP at home   Psychosocial issues?  No   Comments  IV bruising is still present but is fading.    What is the patient's perception of their health status since discharge?  Improving   Is the patient/caregiver able to teach back signs and symptoms related to disease process for when to call PCP?  Yes   Is the patient/caregiver able to teach back signs and symptoms related to disease process for when to call 911?  Yes   Week 3 Call Completed?  Yes   Revoked  No further contact(revokes)-requires comment   Graduated/Revoked comments  doing just fine\"          Shefali Alvarez RN  "

## 2019-06-25 ENCOUNTER — HOSPITAL ENCOUNTER (OUTPATIENT)
Dept: INFUSION THERAPY | Facility: HOSPITAL | Age: 74
Discharge: HOME | End: 2019-06-25
Admitting: PHYSICIAN ASSISTANT

## 2019-06-25 VITALS — OXYGEN SATURATION: 99 % | RESPIRATION RATE: 19 BRPM | TEMPERATURE: 98.2 F

## 2019-06-25 DIAGNOSIS — I48.91 ATRIAL FIBRILLATION, UNSPECIFIED TYPE (HCC): ICD-10-CM

## 2019-06-25 DIAGNOSIS — D50.9 IRON DEFICIENCY ANEMIA, UNSPECIFIED IRON DEFICIENCY ANEMIA TYPE: Primary | ICD-10-CM

## 2019-06-25 DIAGNOSIS — N18.4 CHRONIC KIDNEY DISEASE, STAGE IV (SEVERE) (HCC): ICD-10-CM

## 2019-06-25 LAB
DEPRECATED RDW RBC AUTO: 55.5 FL (ref 37–54)
ERYTHROCYTE [DISTWIDTH] IN BLOOD BY AUTOMATED COUNT: 15.4 % (ref 12.3–15.4)
HCT VFR BLD AUTO: 31.2 % (ref 34–46.6)
HGB BLD-MCNC: 9.8 G/DL (ref 12–15.9)
INR PPP: 1.22 (ref 0.9–1.1)
IRON 24H UR-MRATE: 55 MCG/DL (ref 37–181)
MCH RBC QN AUTO: 30.9 PG (ref 26.6–33)
MCHC RBC AUTO-ENTMCNC: 31.4 G/DL (ref 31.5–35.7)
MCV RBC AUTO: 98.4 FL (ref 79–97)
PLATELET # BLD AUTO: 146 10*3/MM3 (ref 140–450)
PMV BLD AUTO: 10.8 FL (ref 6–12)
PROTHROMBIN TIME: 15.8 SECONDS (ref 12–15.1)
RBC # BLD AUTO: 3.17 10*6/MM3 (ref 3.77–5.28)
WBC NRBC COR # BLD: 4.89 10*3/MM3 (ref 3.4–10.8)

## 2019-06-25 PROCEDURE — 36415 COLL VENOUS BLD VENIPUNCTURE: CPT

## 2019-06-25 PROCEDURE — 25010000002 EPOETIN ALFA PER 1000 UNITS: Performed by: PHYSICIAN ASSISTANT

## 2019-06-25 PROCEDURE — 85027 COMPLETE CBC AUTOMATED: CPT | Performed by: PHYSICIAN ASSISTANT

## 2019-06-25 PROCEDURE — 96372 THER/PROPH/DIAG INJ SC/IM: CPT

## 2019-06-25 PROCEDURE — 85610 PROTHROMBIN TIME: CPT | Performed by: FAMILY MEDICINE

## 2019-06-25 PROCEDURE — 83540 ASSAY OF IRON: CPT

## 2019-06-25 RX ADMIN — ERYTHROPOIETIN 40000 UNITS: 40000 INJECTION, SOLUTION INTRAVENOUS; SUBCUTANEOUS at 15:58

## 2019-06-29 ENCOUNTER — APPOINTMENT (OUTPATIENT)
Dept: GENERAL RADIOLOGY | Facility: HOSPITAL | Age: 74
End: 2019-06-29

## 2019-06-29 ENCOUNTER — HOSPITAL ENCOUNTER (INPATIENT)
Facility: HOSPITAL | Age: 74
LOS: 2 days | Discharge: HOME | End: 2019-07-01
Attending: EMERGENCY MEDICINE | Admitting: INTERNAL MEDICINE

## 2019-06-29 DIAGNOSIS — G93.41 ACUTE METABOLIC ENCEPHALOPATHY: ICD-10-CM

## 2019-06-29 DIAGNOSIS — J18.9 PNEUMONIA OF BOTH LUNGS DUE TO INFECTIOUS ORGANISM, UNSPECIFIED PART OF LUNG: ICD-10-CM

## 2019-06-29 DIAGNOSIS — F02.80 DEMENTIA OF THE ALZHEIMER'S TYPE WITH LATE ONSET WITHOUT BEHAVIORAL DISTURBANCE (HCC): ICD-10-CM

## 2019-06-29 DIAGNOSIS — Z74.09 IMPAIRED MOBILITY AND ADLS: ICD-10-CM

## 2019-06-29 DIAGNOSIS — Z78.9 IMPAIRED MOBILITY AND ADLS: ICD-10-CM

## 2019-06-29 DIAGNOSIS — N39.0 URINARY TRACT INFECTION WITHOUT HEMATURIA, SITE UNSPECIFIED: Primary | ICD-10-CM

## 2019-06-29 DIAGNOSIS — I50.9 CONGESTIVE HEART FAILURE, UNSPECIFIED HF CHRONICITY, UNSPECIFIED HEART FAILURE TYPE (HCC): ICD-10-CM

## 2019-06-29 DIAGNOSIS — G30.1 DEMENTIA OF THE ALZHEIMER'S TYPE WITH LATE ONSET WITHOUT BEHAVIORAL DISTURBANCE (HCC): ICD-10-CM

## 2019-06-29 LAB
A-A DO2: ABNORMAL MMHG
ALBUMIN SERPL-MCNC: 4 G/DL (ref 3.5–5)
ALBUMIN/GLOB SERPL: 1.5 G/DL (ref 1–2)
ALP SERPL-CCNC: 77 U/L (ref 38–126)
ALT SERPL W P-5'-P-CCNC: 39 U/L (ref 13–69)
ANION GAP SERPL CALCULATED.3IONS-SCNC: 12.7 MMOL/L (ref 10–20)
ARTERIAL PATENCY WRIST A: POSITIVE
AST SERPL-CCNC: 28 U/L (ref 15–46)
ATMOSPHERIC PRESS: 736 MMHG
BACTERIA UR QL AUTO: ABNORMAL /HPF
BASE EXCESS BLDA CALC-SCNC: -0.6 MMOL/L (ref 0–2)
BASOPHILS # BLD AUTO: 0.01 10*3/MM3 (ref 0–0.2)
BASOPHILS NFR BLD AUTO: 0.1 % (ref 0–1.5)
BDY SITE: ABNORMAL
BILIRUB SERPL-MCNC: 0.7 MG/DL (ref 0.2–1.3)
BILIRUB UR QL STRIP: NEGATIVE
BUN BLD-MCNC: 24 MG/DL (ref 7–20)
BUN/CREAT SERPL: 16 (ref 7.1–23.5)
CALCIUM SPEC-SCNC: 9.5 MG/DL (ref 8.4–10.2)
CHLORIDE SERPL-SCNC: 107 MMOL/L (ref 98–107)
CLARITY UR: CLEAR
CO2 SERPL-SCNC: 24 MMOL/L (ref 26–30)
COHGB MFR BLD: 0.9 % (ref 0–2)
COLOR UR: YELLOW
CREAT BLD-MCNC: 1.5 MG/DL (ref 0.6–1.3)
D-LACTATE SERPL-SCNC: 1.1 MMOL/L (ref 0.5–2)
DEPRECATED RDW RBC AUTO: 56.2 FL (ref 37–54)
EOSINOPHIL # BLD AUTO: 0.02 10*3/MM3 (ref 0–0.4)
EOSINOPHIL NFR BLD AUTO: 0.2 % (ref 0.3–6.2)
ERYTHROCYTE [DISTWIDTH] IN BLOOD BY AUTOMATED COUNT: 16.1 % (ref 12.3–15.4)
FLUAV AG NPH QL: NEGATIVE
FLUBV AG NPH QL IA: NEGATIVE
GAS FLOW AIRWAY: 5 LPM
GFR SERPL CREATININE-BSD FRML MDRD: 34 ML/MIN/1.73
GLOBULIN UR ELPH-MCNC: 2.6 GM/DL
GLUCOSE BLD-MCNC: 136 MG/DL (ref 74–98)
GLUCOSE UR STRIP-MCNC: NEGATIVE MG/DL
HCO3 BLDA-SCNC: 23.9 MMOL/L (ref 22–28)
HCT VFR BLD AUTO: 36.3 % (ref 34–46.6)
HCT VFR BLD CALC: 36.1 %
HGB BLD-MCNC: 11.5 G/DL (ref 12–15.9)
HGB BLDA-MCNC: 11.8 G/DL (ref 12–18)
HGB UR QL STRIP.AUTO: NEGATIVE
HOLD SPECIMEN: NORMAL
HOROWITZ INDEX BLD+IHG-RTO: 40 %
HYALINE CASTS UR QL AUTO: ABNORMAL /LPF
IMM GRANULOCYTES # BLD AUTO: 0.08 10*3/MM3 (ref 0–0.05)
IMM GRANULOCYTES NFR BLD AUTO: 0.7 % (ref 0–0.5)
INR PPP: 1.12 (ref 0.9–1.1)
KETONES UR QL STRIP: NEGATIVE
LEUKOCYTE ESTERASE UR QL STRIP.AUTO: NEGATIVE
LIPASE SERPL-CCNC: 151 U/L (ref 23–300)
LYMPHOCYTES # BLD AUTO: 0.8 10*3/MM3 (ref 0.7–3.1)
LYMPHOCYTES NFR BLD AUTO: 7.4 % (ref 19.6–45.3)
MCH RBC QN AUTO: 30.9 PG (ref 26.6–33)
MCHC RBC AUTO-ENTMCNC: 31.7 G/DL (ref 31.5–35.7)
MCV RBC AUTO: 97.6 FL (ref 79–97)
METHGB BLD QL: 0.9 % (ref 0–1.5)
MODALITY: ABNORMAL
MONOCYTES # BLD AUTO: 0.74 10*3/MM3 (ref 0.1–0.9)
MONOCYTES NFR BLD AUTO: 6.8 % (ref 5–12)
NEUTROPHILS # BLD AUTO: 9.22 10*3/MM3 (ref 1.7–7)
NEUTROPHILS NFR BLD AUTO: 84.8 % (ref 42.7–76)
NITRITE UR QL STRIP: NEGATIVE
NOTE: ABNORMAL
NRBC BLD AUTO-RTO: 0.3 /100 WBC (ref 0–0.2)
NT-PROBNP SERPL-MCNC: 6980 PG/ML (ref 0–125)
OXYHGB MFR BLDV: 89.6 % (ref 94–99)
PCO2 BLDA: 37.7 MM HG (ref 35–45)
PCO2 TEMP ADJ BLD: ABNORMAL MM HG (ref 35–45)
PH BLDA: 7.41 PH UNITS (ref 7.3–7.5)
PH UR STRIP.AUTO: <=5 [PH] (ref 5–8)
PH, TEMP CORRECTED: ABNORMAL PH UNITS
PLATELET # BLD AUTO: 189 10*3/MM3 (ref 140–450)
PMV BLD AUTO: 10.4 FL (ref 6–12)
PO2 BLDA: 57.4 MM HG (ref 75–100)
PO2 TEMP ADJ BLD: ABNORMAL MM HG (ref 83–108)
POTASSIUM BLD-SCNC: 3.7 MMOL/L (ref 3.5–5.1)
PROCALCITONIN SERPL-MCNC: <0.05 NG/ML
PROT SERPL-MCNC: 6.6 G/DL (ref 6.3–8.2)
PROT UR QL STRIP: ABNORMAL
PROTHROMBIN TIME: 14.8 SECONDS (ref 12–15.1)
RBC # BLD AUTO: 3.72 10*6/MM3 (ref 3.77–5.28)
RBC # UR: ABNORMAL /HPF
REF LAB TEST METHOD: ABNORMAL
SAO2 % BLDCOA: 91.2 % (ref 94–100)
SODIUM BLD-SCNC: 140 MMOL/L (ref 137–145)
SP GR UR STRIP: 1.02 (ref 1–1.03)
SQUAMOUS #/AREA URNS HPF: ABNORMAL /HPF
TROPONIN I SERPL-MCNC: 0.03 NG/ML (ref 0–0.03)
UNIDENT CRYS URNS QL MICRO: ABNORMAL /HPF
UROBILINOGEN UR QL STRIP: ABNORMAL
VENTILATOR MODE: ABNORMAL
WBC NRBC COR # BLD: 10.87 10*3/MM3 (ref 3.4–10.8)
WBC UR QL AUTO: ABNORMAL /HPF
WHOLE BLOOD HOLD SPECIMEN: NORMAL
WHOLE BLOOD HOLD SPECIMEN: NORMAL

## 2019-06-29 PROCEDURE — 83605 ASSAY OF LACTIC ACID: CPT | Performed by: NURSE PRACTITIONER

## 2019-06-29 PROCEDURE — P9612 CATHETERIZE FOR URINE SPEC: HCPCS

## 2019-06-29 PROCEDURE — 84484 ASSAY OF TROPONIN QUANT: CPT | Performed by: INTERNAL MEDICINE

## 2019-06-29 PROCEDURE — 99285 EMERGENCY DEPT VISIT HI MDM: CPT

## 2019-06-29 PROCEDURE — 83880 ASSAY OF NATRIURETIC PEPTIDE: CPT

## 2019-06-29 PROCEDURE — 82805 BLOOD GASES W/O2 SATURATION: CPT

## 2019-06-29 PROCEDURE — 87804 INFLUENZA ASSAY W/OPTIC: CPT | Performed by: NURSE PRACTITIONER

## 2019-06-29 PROCEDURE — 36600 WITHDRAWAL OF ARTERIAL BLOOD: CPT

## 2019-06-29 PROCEDURE — 25010000002 AZITHROMYCIN: Performed by: INTERNAL MEDICINE

## 2019-06-29 PROCEDURE — 25010000002 ERTAPENEM PER 500 MG: Performed by: NURSE PRACTITIONER

## 2019-06-29 PROCEDURE — 84484 ASSAY OF TROPONIN QUANT: CPT

## 2019-06-29 PROCEDURE — 99222 1ST HOSP IP/OBS MODERATE 55: CPT | Performed by: INTERNAL MEDICINE

## 2019-06-29 PROCEDURE — 84145 PROCALCITONIN (PCT): CPT | Performed by: NURSE PRACTITIONER

## 2019-06-29 PROCEDURE — 81001 URINALYSIS AUTO W/SCOPE: CPT | Performed by: NURSE PRACTITIONER

## 2019-06-29 PROCEDURE — 85025 COMPLETE CBC W/AUTO DIFF WBC: CPT

## 2019-06-29 PROCEDURE — 82375 ASSAY CARBOXYHB QUANT: CPT

## 2019-06-29 PROCEDURE — 94799 UNLISTED PULMONARY SVC/PX: CPT

## 2019-06-29 PROCEDURE — 94640 AIRWAY INHALATION TREATMENT: CPT

## 2019-06-29 PROCEDURE — 80053 COMPREHEN METABOLIC PANEL: CPT

## 2019-06-29 PROCEDURE — 71046 X-RAY EXAM CHEST 2 VIEWS: CPT

## 2019-06-29 PROCEDURE — 83050 HGB METHEMOGLOBIN QUAN: CPT

## 2019-06-29 PROCEDURE — 87040 BLOOD CULTURE FOR BACTERIA: CPT | Performed by: NURSE PRACTITIONER

## 2019-06-29 PROCEDURE — 25010000002 FUROSEMIDE PER 20 MG: Performed by: INTERNAL MEDICINE

## 2019-06-29 PROCEDURE — 83690 ASSAY OF LIPASE: CPT | Performed by: NURSE PRACTITIONER

## 2019-06-29 PROCEDURE — 93005 ELECTROCARDIOGRAM TRACING: CPT

## 2019-06-29 PROCEDURE — 25010000002 FUROSEMIDE PER 20 MG: Performed by: NURSE PRACTITIONER

## 2019-06-29 PROCEDURE — 85610 PROTHROMBIN TIME: CPT | Performed by: NURSE PRACTITIONER

## 2019-06-29 RX ORDER — LOSARTAN POTASSIUM 50 MG/1
50 TABLET ORAL DAILY
Status: DISCONTINUED | OUTPATIENT
Start: 2019-06-30 | End: 2019-07-01

## 2019-06-29 RX ORDER — SODIUM CHLORIDE 0.9 % (FLUSH) 0.9 %
10 SYRINGE (ML) INJECTION AS NEEDED
Status: DISCONTINUED | OUTPATIENT
Start: 2019-06-29 | End: 2019-07-01 | Stop reason: HOSPADM

## 2019-06-29 RX ORDER — DONEPEZIL HYDROCHLORIDE 5 MG/1
10 TABLET, FILM COATED ORAL NIGHTLY
Status: DISCONTINUED | OUTPATIENT
Start: 2019-06-29 | End: 2019-07-01 | Stop reason: HOSPADM

## 2019-06-29 RX ORDER — IPRATROPIUM BROMIDE AND ALBUTEROL SULFATE 2.5; .5 MG/3ML; MG/3ML
3 SOLUTION RESPIRATORY (INHALATION)
Status: DISCONTINUED | OUTPATIENT
Start: 2019-06-30 | End: 2019-07-01 | Stop reason: HOSPADM

## 2019-06-29 RX ORDER — ONDANSETRON 2 MG/ML
4 INJECTION INTRAMUSCULAR; INTRAVENOUS EVERY 6 HOURS PRN
Status: DISCONTINUED | OUTPATIENT
Start: 2019-06-29 | End: 2019-07-01 | Stop reason: HOSPADM

## 2019-06-29 RX ORDER — SPIRONOLACTONE 25 MG/1
25 TABLET ORAL DAILY
Status: DISCONTINUED | OUTPATIENT
Start: 2019-06-30 | End: 2019-07-01 | Stop reason: HOSPADM

## 2019-06-29 RX ORDER — ACETAMINOPHEN 325 MG/1
650 TABLET ORAL EVERY 4 HOURS PRN
Status: DISCONTINUED | OUTPATIENT
Start: 2019-06-29 | End: 2019-07-01 | Stop reason: HOSPADM

## 2019-06-29 RX ORDER — SODIUM CHLORIDE 0.9 % (FLUSH) 0.9 %
3-10 SYRINGE (ML) INJECTION AS NEEDED
Status: DISCONTINUED | OUTPATIENT
Start: 2019-06-29 | End: 2019-07-01 | Stop reason: HOSPADM

## 2019-06-29 RX ORDER — CARVEDILOL 25 MG/1
25 TABLET ORAL EVERY 12 HOURS SCHEDULED
Status: DISCONTINUED | OUTPATIENT
Start: 2019-06-29 | End: 2019-06-30

## 2019-06-29 RX ORDER — ASPIRIN 81 MG/1
81 TABLET ORAL DAILY
Status: DISCONTINUED | OUTPATIENT
Start: 2019-06-30 | End: 2019-07-01

## 2019-06-29 RX ORDER — IPRATROPIUM BROMIDE AND ALBUTEROL SULFATE 2.5; .5 MG/3ML; MG/3ML
3 SOLUTION RESPIRATORY (INHALATION) ONCE
Status: COMPLETED | OUTPATIENT
Start: 2019-06-29 | End: 2019-06-29

## 2019-06-29 RX ORDER — BISACODYL 5 MG/1
5 TABLET, DELAYED RELEASE ORAL DAILY PRN
Status: DISCONTINUED | OUTPATIENT
Start: 2019-06-29 | End: 2019-07-01 | Stop reason: HOSPADM

## 2019-06-29 RX ORDER — ALUMINA, MAGNESIA, AND SIMETHICONE 2400; 2400; 240 MG/30ML; MG/30ML; MG/30ML
15 SUSPENSION ORAL EVERY 6 HOURS PRN
Status: DISCONTINUED | OUTPATIENT
Start: 2019-06-29 | End: 2019-07-01 | Stop reason: HOSPADM

## 2019-06-29 RX ORDER — WARFARIN SODIUM 2 MG/1
4 TABLET ORAL
Status: DISCONTINUED | OUTPATIENT
Start: 2019-06-29 | End: 2019-06-30 | Stop reason: DRUGHIGH

## 2019-06-29 RX ORDER — ACETAMINOPHEN 325 MG/1
975 TABLET ORAL ONCE
Status: COMPLETED | OUTPATIENT
Start: 2019-06-29 | End: 2019-06-29

## 2019-06-29 RX ORDER — FUROSEMIDE 10 MG/ML
40 INJECTION INTRAMUSCULAR; INTRAVENOUS ONCE
Status: COMPLETED | OUTPATIENT
Start: 2019-06-29 | End: 2019-06-29

## 2019-06-29 RX ORDER — GABAPENTIN 300 MG/1
600 CAPSULE ORAL EVERY 12 HOURS SCHEDULED
Status: DISCONTINUED | OUTPATIENT
Start: 2019-06-29 | End: 2019-07-01 | Stop reason: HOSPADM

## 2019-06-29 RX ORDER — POTASSIUM CHLORIDE 1.5 G/1.77G
20 POWDER, FOR SOLUTION ORAL ONCE
Status: COMPLETED | OUTPATIENT
Start: 2019-06-29 | End: 2019-06-29

## 2019-06-29 RX ORDER — SODIUM CHLORIDE 0.9 % (FLUSH) 0.9 %
3 SYRINGE (ML) INJECTION EVERY 12 HOURS SCHEDULED
Status: DISCONTINUED | OUTPATIENT
Start: 2019-06-29 | End: 2019-07-01 | Stop reason: HOSPADM

## 2019-06-29 RX ORDER — FUROSEMIDE 10 MG/ML
20 INJECTION INTRAMUSCULAR; INTRAVENOUS ONCE
Status: COMPLETED | OUTPATIENT
Start: 2019-06-29 | End: 2019-06-29

## 2019-06-29 RX ORDER — ATORVASTATIN CALCIUM 40 MG/1
40 TABLET, FILM COATED ORAL NIGHTLY
Status: DISCONTINUED | OUTPATIENT
Start: 2019-06-30 | End: 2019-07-01 | Stop reason: HOSPADM

## 2019-06-29 RX ADMIN — IPRATROPIUM BROMIDE AND ALBUTEROL SULFATE 3 ML: .5; 3 SOLUTION RESPIRATORY (INHALATION) at 17:38

## 2019-06-29 RX ADMIN — POTASSIUM CHLORIDE 20 MEQ: 1.5 POWDER, FOR SOLUTION ORAL at 23:37

## 2019-06-29 RX ADMIN — SODIUM CHLORIDE, PRESERVATIVE FREE 3 ML: 5 INJECTION INTRAVENOUS at 23:41

## 2019-06-29 RX ADMIN — CARVEDILOL 25 MG: 25 TABLET, FILM COATED ORAL at 23:37

## 2019-06-29 RX ADMIN — GABAPENTIN 600 MG: 300 CAPSULE ORAL at 23:37

## 2019-06-29 RX ADMIN — DONEPEZIL HYDROCHLORIDE 10 MG: 5 TABLET ORAL at 23:37

## 2019-06-29 RX ADMIN — ACETAMINOPHEN 975 MG: 325 TABLET, FILM COATED ORAL at 18:00

## 2019-06-29 RX ADMIN — AZITHROMYCIN 500 MG: 500 INJECTION, POWDER, LYOPHILIZED, FOR SOLUTION INTRAVENOUS at 23:37

## 2019-06-29 RX ADMIN — FUROSEMIDE 40 MG: 10 INJECTION, SOLUTION INTRAMUSCULAR; INTRAVENOUS at 19:09

## 2019-06-29 RX ADMIN — FUROSEMIDE 20 MG: 10 INJECTION, SOLUTION INTRAMUSCULAR; INTRAVENOUS at 23:37

## 2019-06-29 RX ADMIN — SODIUM CHLORIDE 1000 ML: 9 INJECTION, SOLUTION INTRAVENOUS at 18:14

## 2019-06-29 RX ADMIN — ERTAPENEM SODIUM 1 G: 1 INJECTION, POWDER, LYOPHILIZED, FOR SOLUTION INTRAMUSCULAR; INTRAVENOUS at 19:52

## 2019-06-30 ENCOUNTER — APPOINTMENT (OUTPATIENT)
Dept: GENERAL RADIOLOGY | Facility: HOSPITAL | Age: 74
End: 2019-06-30

## 2019-06-30 PROBLEM — R77.8 ELEVATED TROPONIN: Status: ACTIVE | Noted: 2019-06-30

## 2019-06-30 PROBLEM — J18.9 PNEUMONIA OF RIGHT LOWER LOBE DUE TO INFECTIOUS ORGANISM: Status: ACTIVE | Noted: 2019-06-30

## 2019-06-30 PROBLEM — N18.30 CKD (CHRONIC KIDNEY DISEASE) STAGE 3, GFR 30-59 ML/MIN (HCC): Status: ACTIVE | Noted: 2019-06-30

## 2019-06-30 PROBLEM — G93.41 ACUTE METABOLIC ENCEPHALOPATHY: Status: ACTIVE | Noted: 2019-06-30

## 2019-06-30 PROBLEM — R79.89 ELEVATED TROPONIN: Status: ACTIVE | Noted: 2019-06-30

## 2019-06-30 LAB
ANION GAP SERPL CALCULATED.3IONS-SCNC: 10.3 MMOL/L (ref 10–20)
BASOPHILS # BLD AUTO: 0.01 10*3/MM3 (ref 0–0.2)
BASOPHILS NFR BLD AUTO: 0.1 % (ref 0–1.5)
BUN BLD-MCNC: 25 MG/DL (ref 7–20)
BUN/CREAT SERPL: 17.9 (ref 7.1–23.5)
CALCIUM SPEC-SCNC: 8.6 MG/DL (ref 8.4–10.2)
CHLORIDE SERPL-SCNC: 105 MMOL/L (ref 98–107)
CO2 SERPL-SCNC: 28 MMOL/L (ref 26–30)
CREAT BLD-MCNC: 1.4 MG/DL (ref 0.6–1.3)
DEPRECATED RDW RBC AUTO: 55.9 FL (ref 37–54)
EOSINOPHIL # BLD AUTO: 0.01 10*3/MM3 (ref 0–0.4)
EOSINOPHIL NFR BLD AUTO: 0.1 % (ref 0.3–6.2)
ERYTHROCYTE [DISTWIDTH] IN BLOOD BY AUTOMATED COUNT: 16.1 % (ref 12.3–15.4)
GFR SERPL CREATININE-BSD FRML MDRD: 37 ML/MIN/1.73
GLUCOSE BLD-MCNC: 120 MG/DL (ref 74–98)
HCT VFR BLD AUTO: 31.7 % (ref 34–46.6)
HGB BLD-MCNC: 9.9 G/DL (ref 12–15.9)
IMM GRANULOCYTES # BLD AUTO: 0.05 10*3/MM3 (ref 0–0.05)
IMM GRANULOCYTES NFR BLD AUTO: 0.7 % (ref 0–0.5)
INR PPP: 1.33 (ref 0.9–1.1)
LYMPHOCYTES # BLD AUTO: 0.79 10*3/MM3 (ref 0.7–3.1)
LYMPHOCYTES NFR BLD AUTO: 11.2 % (ref 19.6–45.3)
MCH RBC QN AUTO: 30.4 PG (ref 26.6–33)
MCHC RBC AUTO-ENTMCNC: 31.2 G/DL (ref 31.5–35.7)
MCV RBC AUTO: 97.2 FL (ref 79–97)
MONOCYTES # BLD AUTO: 0.64 10*3/MM3 (ref 0.1–0.9)
MONOCYTES NFR BLD AUTO: 9.1 % (ref 5–12)
NEUTROPHILS # BLD AUTO: 5.53 10*3/MM3 (ref 1.7–7)
NEUTROPHILS NFR BLD AUTO: 78.8 % (ref 42.7–76)
NRBC BLD AUTO-RTO: 0 /100 WBC (ref 0–0.2)
PLATELET # BLD AUTO: 151 10*3/MM3 (ref 140–450)
PMV BLD AUTO: 10.7 FL (ref 6–12)
POTASSIUM BLD-SCNC: 3.3 MMOL/L (ref 3.5–5.1)
PROTHROMBIN TIME: 16.8 SECONDS (ref 12–15.1)
RBC # BLD AUTO: 3.26 10*6/MM3 (ref 3.77–5.28)
SODIUM BLD-SCNC: 140 MMOL/L (ref 137–145)
TROPONIN I SERPL-MCNC: 0.64 NG/ML (ref 0–0.03)
TROPONIN I SERPL-MCNC: 0.81 NG/ML (ref 0–0.03)
TROPONIN I SERPL-MCNC: 0.86 NG/ML (ref 0–0.03)
WBC NRBC COR # BLD: 7.03 10*3/MM3 (ref 3.4–10.8)

## 2019-06-30 PROCEDURE — 99232 SBSQ HOSP IP/OBS MODERATE 35: CPT | Performed by: INTERNAL MEDICINE

## 2019-06-30 PROCEDURE — 84484 ASSAY OF TROPONIN QUANT: CPT | Performed by: INTERNAL MEDICINE

## 2019-06-30 PROCEDURE — 85025 COMPLETE CBC W/AUTO DIFF WBC: CPT | Performed by: INTERNAL MEDICINE

## 2019-06-30 PROCEDURE — 94640 AIRWAY INHALATION TREATMENT: CPT

## 2019-06-30 PROCEDURE — 25010000002 AZITHROMYCIN: Performed by: INTERNAL MEDICINE

## 2019-06-30 PROCEDURE — 25010000002 ERTAPENEM PER 500 MG: Performed by: INTERNAL MEDICINE

## 2019-06-30 PROCEDURE — 94799 UNLISTED PULMONARY SVC/PX: CPT

## 2019-06-30 PROCEDURE — 71045 X-RAY EXAM CHEST 1 VIEW: CPT

## 2019-06-30 PROCEDURE — 85610 PROTHROMBIN TIME: CPT | Performed by: INTERNAL MEDICINE

## 2019-06-30 PROCEDURE — 80048 BASIC METABOLIC PNL TOTAL CA: CPT | Performed by: INTERNAL MEDICINE

## 2019-06-30 RX ORDER — ASPIRIN 81 MG/1
81 TABLET ORAL ONCE
Status: COMPLETED | OUTPATIENT
Start: 2019-06-30 | End: 2019-06-30

## 2019-06-30 RX ORDER — CARVEDILOL 12.5 MG/1
12.5 TABLET ORAL EVERY 12 HOURS SCHEDULED
Status: DISCONTINUED | OUTPATIENT
Start: 2019-06-30 | End: 2019-07-01 | Stop reason: HOSPADM

## 2019-06-30 RX ORDER — CARVEDILOL 6.25 MG/1
6.25 TABLET ORAL EVERY 12 HOURS SCHEDULED
Status: DISCONTINUED | OUTPATIENT
Start: 2019-06-30 | End: 2019-06-30

## 2019-06-30 RX ORDER — WARFARIN SODIUM 2 MG/1
2 TABLET ORAL ONCE
Status: COMPLETED | OUTPATIENT
Start: 2019-06-30 | End: 2019-06-30

## 2019-06-30 RX ORDER — CARVEDILOL 25 MG/1
25 TABLET ORAL EVERY 12 HOURS SCHEDULED
Status: DISCONTINUED | OUTPATIENT
Start: 2019-06-30 | End: 2019-06-30

## 2019-06-30 RX ADMIN — SPIRONOLACTONE 25 MG: 25 TABLET ORAL at 08:57

## 2019-06-30 RX ADMIN — ERTAPENEM SODIUM 1 G: 1 INJECTION, POWDER, LYOPHILIZED, FOR SOLUTION INTRAMUSCULAR; INTRAVENOUS at 21:00

## 2019-06-30 RX ADMIN — NITROGLYCERIN 0.5 INCH: 20 OINTMENT TOPICAL at 23:23

## 2019-06-30 RX ADMIN — WARFARIN SODIUM 6 MG: 5 TABLET ORAL at 17:56

## 2019-06-30 RX ADMIN — GABAPENTIN 600 MG: 300 CAPSULE ORAL at 22:00

## 2019-06-30 RX ADMIN — CARVEDILOL 12.5 MG: 12.5 TABLET, FILM COATED ORAL at 22:00

## 2019-06-30 RX ADMIN — SODIUM CHLORIDE, PRESERVATIVE FREE 3 ML: 5 INJECTION INTRAVENOUS at 08:59

## 2019-06-30 RX ADMIN — CHOLECALCIFEROL CAP 125 MCG (5000 UNIT) 5000 UNITS: 125 CAP at 08:56

## 2019-06-30 RX ADMIN — NITROGLYCERIN 0.5 INCH: 20 OINTMENT TOPICAL at 12:15

## 2019-06-30 RX ADMIN — GABAPENTIN 600 MG: 300 CAPSULE ORAL at 08:56

## 2019-06-30 RX ADMIN — NITROGLYCERIN 0.5 INCH: 20 OINTMENT TOPICAL at 05:55

## 2019-06-30 RX ADMIN — IPRATROPIUM BROMIDE AND ALBUTEROL SULFATE 3 ML: .5; 3 SOLUTION RESPIRATORY (INHALATION) at 20:25

## 2019-06-30 RX ADMIN — LOSARTAN POTASSIUM 50 MG: 50 TABLET, FILM COATED ORAL at 08:57

## 2019-06-30 RX ADMIN — DONEPEZIL HYDROCHLORIDE 10 MG: 5 TABLET ORAL at 22:00

## 2019-06-30 RX ADMIN — AZITHROMYCIN 500 MG: 500 INJECTION, POWDER, LYOPHILIZED, FOR SOLUTION INTRAVENOUS at 23:19

## 2019-06-30 RX ADMIN — NITROGLYCERIN 0.5 INCH: 20 OINTMENT TOPICAL at 17:54

## 2019-06-30 RX ADMIN — WARFARIN SODIUM 2 MG: 2 TABLET ORAL at 03:41

## 2019-06-30 RX ADMIN — CARVEDILOL 25 MG: 25 TABLET, FILM COATED ORAL at 08:57

## 2019-06-30 RX ADMIN — ASPIRIN 81 MG: 81 TABLET, COATED ORAL at 00:35

## 2019-06-30 RX ADMIN — IPRATROPIUM BROMIDE AND ALBUTEROL SULFATE 3 ML: .5; 3 SOLUTION RESPIRATORY (INHALATION) at 12:13

## 2019-06-30 RX ADMIN — ASPIRIN 81 MG: 81 TABLET, COATED ORAL at 08:56

## 2019-06-30 RX ADMIN — SODIUM CHLORIDE, PRESERVATIVE FREE 3 ML: 5 INJECTION INTRAVENOUS at 22:00

## 2019-06-30 RX ADMIN — IPRATROPIUM BROMIDE AND ALBUTEROL SULFATE 3 ML: .5; 3 SOLUTION RESPIRATORY (INHALATION) at 07:21

## 2019-06-30 RX ADMIN — NITROGLYCERIN 0.5 INCH: 20 OINTMENT TOPICAL at 00:35

## 2019-06-30 RX ADMIN — ATORVASTATIN CALCIUM 40 MG: 40 TABLET, FILM COATED ORAL at 22:00

## 2019-07-01 ENCOUNTER — APPOINTMENT (OUTPATIENT)
Dept: CARDIOLOGY | Facility: HOSPITAL | Age: 74
End: 2019-07-01

## 2019-07-01 ENCOUNTER — APPOINTMENT (OUTPATIENT)
Dept: GENERAL RADIOLOGY | Facility: HOSPITAL | Age: 74
End: 2019-07-01

## 2019-07-01 VITALS
WEIGHT: 174.1 LBS | SYSTOLIC BLOOD PRESSURE: 173 MMHG | HEART RATE: 57 BPM | TEMPERATURE: 98.3 F | RESPIRATION RATE: 16 BRPM | DIASTOLIC BLOOD PRESSURE: 67 MMHG | OXYGEN SATURATION: 99 % | HEIGHT: 63 IN | BODY MASS INDEX: 30.85 KG/M2

## 2019-07-01 LAB
ANION GAP SERPL CALCULATED.3IONS-SCNC: 8.2 MMOL/L (ref 10–20)
BASOPHILS # BLD AUTO: 0.01 10*3/MM3 (ref 0–0.2)
BASOPHILS NFR BLD AUTO: 0.2 % (ref 0–1.5)
BUN BLD-MCNC: 27 MG/DL (ref 7–20)
BUN/CREAT SERPL: 20.8 (ref 7.1–23.5)
CALCIUM SPEC-SCNC: 8.5 MG/DL (ref 8.4–10.2)
CHLORIDE SERPL-SCNC: 106 MMOL/L (ref 98–107)
CO2 SERPL-SCNC: 28 MMOL/L (ref 26–30)
CREAT BLD-MCNC: 1.3 MG/DL (ref 0.6–1.3)
DEPRECATED RDW RBC AUTO: 55.8 FL (ref 37–54)
EOSINOPHIL # BLD AUTO: 0.05 10*3/MM3 (ref 0–0.4)
EOSINOPHIL NFR BLD AUTO: 1.1 % (ref 0.3–6.2)
ERYTHROCYTE [DISTWIDTH] IN BLOOD BY AUTOMATED COUNT: 16.5 % (ref 12.3–15.4)
GFR SERPL CREATININE-BSD FRML MDRD: 40 ML/MIN/1.73
GLUCOSE BLD-MCNC: 103 MG/DL (ref 74–98)
HCT VFR BLD AUTO: 29.3 % (ref 34–46.6)
HGB BLD-MCNC: 9.5 G/DL (ref 12–15.9)
IMM GRANULOCYTES # BLD AUTO: 0.03 10*3/MM3 (ref 0–0.05)
IMM GRANULOCYTES NFR BLD AUTO: 0.6 % (ref 0–0.5)
INR PPP: 1.57 (ref 0.9–1.1)
LYMPHOCYTES # BLD AUTO: 0.78 10*3/MM3 (ref 0.7–3.1)
LYMPHOCYTES NFR BLD AUTO: 16.8 % (ref 19.6–45.3)
MAXIMAL PREDICTED HEART RATE: 146 BPM
MCH RBC QN AUTO: 31 PG (ref 26.6–33)
MCHC RBC AUTO-ENTMCNC: 32.4 G/DL (ref 31.5–35.7)
MCV RBC AUTO: 95.8 FL (ref 79–97)
MONOCYTES # BLD AUTO: 0.39 10*3/MM3 (ref 0.1–0.9)
MONOCYTES NFR BLD AUTO: 8.4 % (ref 5–12)
NEUTROPHILS # BLD AUTO: 3.39 10*3/MM3 (ref 1.7–7)
NEUTROPHILS NFR BLD AUTO: 72.9 % (ref 42.7–76)
NRBC BLD AUTO-RTO: 0.4 /100 WBC (ref 0–0.2)
PLATELET # BLD AUTO: 142 10*3/MM3 (ref 140–450)
PMV BLD AUTO: 10.9 FL (ref 6–12)
POTASSIUM BLD-SCNC: 3.2 MMOL/L (ref 3.5–5.1)
PROTHROMBIN TIME: 19.2 SECONDS (ref 12–15.1)
RBC # BLD AUTO: 3.06 10*6/MM3 (ref 3.77–5.28)
SODIUM BLD-SCNC: 139 MMOL/L (ref 137–145)
STRESS TARGET HR: 124 BPM
TROPONIN I SERPL-MCNC: 0.16 NG/ML (ref 0–0.03)
WBC NRBC COR # BLD: 4.65 10*3/MM3 (ref 3.4–10.8)

## 2019-07-01 PROCEDURE — 97165 OT EVAL LOW COMPLEX 30 MIN: CPT

## 2019-07-01 PROCEDURE — 99238 HOSP IP/OBS DSCHRG MGMT 30/<: CPT | Performed by: INTERNAL MEDICINE

## 2019-07-01 PROCEDURE — 85610 PROTHROMBIN TIME: CPT | Performed by: INTERNAL MEDICINE

## 2019-07-01 PROCEDURE — 93306 TTE W/DOPPLER COMPLETE: CPT

## 2019-07-01 PROCEDURE — 84484 ASSAY OF TROPONIN QUANT: CPT | Performed by: INTERNAL MEDICINE

## 2019-07-01 PROCEDURE — 97161 PT EVAL LOW COMPLEX 20 MIN: CPT

## 2019-07-01 PROCEDURE — 94799 UNLISTED PULMONARY SVC/PX: CPT

## 2019-07-01 PROCEDURE — 80048 BASIC METABOLIC PNL TOTAL CA: CPT | Performed by: INTERNAL MEDICINE

## 2019-07-01 PROCEDURE — 71045 X-RAY EXAM CHEST 1 VIEW: CPT

## 2019-07-01 PROCEDURE — 85025 COMPLETE CBC W/AUTO DIFF WBC: CPT | Performed by: INTERNAL MEDICINE

## 2019-07-01 RX ORDER — CLOPIDOGREL BISULFATE 75 MG/1
75 TABLET ORAL DAILY
Qty: 30 TABLET | Refills: 0 | Status: SHIPPED | OUTPATIENT
Start: 2019-07-02 | End: 2021-01-01 | Stop reason: HOSPADM

## 2019-07-01 RX ORDER — WARFARIN SODIUM 2 MG/1
4 TABLET ORAL
Status: DISCONTINUED | OUTPATIENT
Start: 2019-07-01 | End: 2019-07-01 | Stop reason: HOSPADM

## 2019-07-01 RX ORDER — DOXYCYCLINE 100 MG/1
100 CAPSULE ORAL EVERY 12 HOURS SCHEDULED
Qty: 9 CAPSULE | Refills: 0 | Status: SHIPPED | OUTPATIENT
Start: 2019-07-01 | End: 2019-07-06

## 2019-07-01 RX ORDER — DOXYCYCLINE 100 MG/1
100 CAPSULE ORAL EVERY 12 HOURS SCHEDULED
Status: DISCONTINUED | OUTPATIENT
Start: 2019-07-01 | End: 2019-07-01 | Stop reason: HOSPADM

## 2019-07-01 RX ORDER — ISOSORBIDE MONONITRATE 30 MG/1
30 TABLET, EXTENDED RELEASE ORAL
Status: DISCONTINUED | OUTPATIENT
Start: 2019-07-01 | End: 2019-07-01 | Stop reason: HOSPADM

## 2019-07-01 RX ORDER — CLOPIDOGREL BISULFATE 75 MG/1
75 TABLET ORAL DAILY
Status: DISCONTINUED | OUTPATIENT
Start: 2019-07-01 | End: 2019-07-01 | Stop reason: HOSPADM

## 2019-07-01 RX ORDER — CARVEDILOL 12.5 MG/1
12.5 TABLET ORAL EVERY 12 HOURS SCHEDULED
Qty: 60 TABLET | Refills: 0 | Status: SHIPPED | OUTPATIENT
Start: 2019-07-01 | End: 2020-03-11 | Stop reason: ALTCHOICE

## 2019-07-01 RX ORDER — AMIODARONE HYDROCHLORIDE 200 MG/1
200 TABLET ORAL
Status: DISCONTINUED | OUTPATIENT
Start: 2019-07-01 | End: 2019-07-01 | Stop reason: HOSPADM

## 2019-07-01 RX ORDER — VALSARTAN 80 MG/1
160 TABLET ORAL EVERY 12 HOURS SCHEDULED
Status: DISCONTINUED | OUTPATIENT
Start: 2019-07-01 | End: 2019-07-01 | Stop reason: HOSPADM

## 2019-07-01 RX ORDER — AMLODIPINE BESYLATE 5 MG/1
5 TABLET ORAL
Status: DISCONTINUED | OUTPATIENT
Start: 2019-07-01 | End: 2019-07-01

## 2019-07-01 RX ORDER — VALSARTAN 160 MG/1
160 TABLET ORAL EVERY 12 HOURS SCHEDULED
Qty: 60 TABLET | Refills: 0 | Status: SHIPPED | OUTPATIENT
Start: 2019-07-01 | End: 2020-03-11 | Stop reason: ALTCHOICE

## 2019-07-01 RX ORDER — AMIODARONE HYDROCHLORIDE 200 MG/1
200 TABLET ORAL
Qty: 30 TABLET | Refills: 0 | Status: SHIPPED | OUTPATIENT
Start: 2019-07-02 | End: 2020-01-01

## 2019-07-01 RX ORDER — ISOSORBIDE MONONITRATE 30 MG/1
30 TABLET, EXTENDED RELEASE ORAL
Qty: 30 TABLET | Refills: 0 | Status: SHIPPED | OUTPATIENT
Start: 2019-07-02 | End: 2021-01-01 | Stop reason: HOSPADM

## 2019-07-01 RX ADMIN — ISOSORBIDE MONONITRATE 30 MG: 30 TABLET, EXTENDED RELEASE ORAL at 12:05

## 2019-07-01 RX ADMIN — NITROGLYCERIN 0.5 INCH: 20 OINTMENT TOPICAL at 06:16

## 2019-07-01 RX ADMIN — ASPIRIN 81 MG: 81 TABLET, COATED ORAL at 08:03

## 2019-07-01 RX ADMIN — IPRATROPIUM BROMIDE AND ALBUTEROL SULFATE 3 ML: .5; 3 SOLUTION RESPIRATORY (INHALATION) at 13:09

## 2019-07-01 RX ADMIN — IPRATROPIUM BROMIDE AND ALBUTEROL SULFATE 3 ML: .5; 3 SOLUTION RESPIRATORY (INHALATION) at 07:06

## 2019-07-01 RX ADMIN — GABAPENTIN 600 MG: 300 CAPSULE ORAL at 08:03

## 2019-07-01 RX ADMIN — CLOPIDOGREL BISULFATE 75 MG: 75 TABLET ORAL at 12:05

## 2019-07-01 RX ADMIN — AMIODARONE HYDROCHLORIDE 200 MG: 200 TABLET ORAL at 12:04

## 2019-07-01 RX ADMIN — SODIUM CHLORIDE, PRESERVATIVE FREE 3 ML: 5 INJECTION INTRAVENOUS at 08:05

## 2019-07-01 RX ADMIN — LOSARTAN POTASSIUM 50 MG: 50 TABLET, FILM COATED ORAL at 08:03

## 2019-07-01 RX ADMIN — DOXYCYCLINE 100 MG: 100 CAPSULE ORAL at 12:04

## 2019-07-01 RX ADMIN — VALSARTAN 160 MG: 80 TABLET, FILM COATED ORAL at 12:05

## 2019-07-01 RX ADMIN — CHOLECALCIFEROL CAP 125 MCG (5000 UNIT) 5000 UNITS: 125 CAP at 08:03

## 2019-07-01 RX ADMIN — SPIRONOLACTONE 25 MG: 25 TABLET ORAL at 08:03

## 2019-07-01 RX ADMIN — CARVEDILOL 12.5 MG: 12.5 TABLET, FILM COATED ORAL at 08:03

## 2019-07-02 ENCOUNTER — READMISSION MANAGEMENT (OUTPATIENT)
Dept: CALL CENTER | Facility: HOSPITAL | Age: 74
End: 2019-07-02

## 2019-07-02 NOTE — OUTREACH NOTE
Prep Survey      Responses   Facility patient discharged from?  [place]   Is patient eligible?  Yes   Discharge diagnosis  CHF,  NSTEMI,  alzheimer's   Does the patient have one of the following disease processes/diagnoses(primary or secondary)?  CHF   Does the patient have Home health ordered?  Yes   What is the Home health agency?   [facility]   Is there a DME ordered?  No   Comments regarding appointments  see AVS   Prep survey completed?  Yes          Zandra Mendoza RN

## 2019-07-03 ENCOUNTER — READMISSION MANAGEMENT (OUTPATIENT)
Dept: CALL CENTER | Facility: HOSPITAL | Age: 74
End: 2019-07-03

## 2019-07-03 NOTE — OUTREACH NOTE
CHF Week 1 Survey      Responses   Facility patient discharged from?  [facility]   Does the patient have one of the following disease processes/diagnoses(primary or secondary)?  CHF   Is there a successful TCM telephone encounter documented?  No   CHF Week 1 attempt successful?  No   Unsuccessful attempts  Attempt 1          Lillian Aguilar RN

## 2019-07-04 LAB
BACTERIA SPEC AEROBE CULT: NORMAL
BACTERIA SPEC AEROBE CULT: NORMAL

## 2019-07-07 ENCOUNTER — READMISSION MANAGEMENT (OUTPATIENT)
Dept: CALL CENTER | Facility: HOSPITAL | Age: 74
End: 2019-07-07

## 2019-07-07 NOTE — OUTREACH NOTE
CHF Week 1 Survey      Responses   Facility [facility] discharged from?  [facility]   Does the patient have one of the following disease processes/diagnoses(primary or secondary)?  CHF   Is there a successful TCM telephone encounter documented?  No   CHF Week 1 attempt successful?  Yes   Call start time  1222   Revoke  Decline to participate [She was very upset with care this time. ]   Call end time  1223          Marcela Lynn RN

## 2019-07-09 ENCOUNTER — HOSPITAL ENCOUNTER (OUTPATIENT)
Dept: INFUSION THERAPY | Facility: HOSPITAL | Age: 74
Discharge: HOME | End: 2019-07-09
Admitting: INTERNAL MEDICINE

## 2019-07-09 VITALS
HEART RATE: 64 BPM | WEIGHT: 178 LBS | OXYGEN SATURATION: 99 % | DIASTOLIC BLOOD PRESSURE: 61 MMHG | TEMPERATURE: 98.3 F | HEIGHT: 63 IN | RESPIRATION RATE: 18 BRPM | SYSTOLIC BLOOD PRESSURE: 144 MMHG | BODY MASS INDEX: 31.54 KG/M2

## 2019-07-09 DIAGNOSIS — I48.91 ATRIAL FIBRILLATION, UNSPECIFIED TYPE (HCC): Primary | ICD-10-CM

## 2019-07-09 DIAGNOSIS — N18.4 CHRONIC KIDNEY DISEASE, STAGE IV (SEVERE) (HCC): ICD-10-CM

## 2019-07-09 DIAGNOSIS — D50.9 IRON DEFICIENCY ANEMIA, UNSPECIFIED IRON DEFICIENCY ANEMIA TYPE: ICD-10-CM

## 2019-07-09 LAB
DEPRECATED RDW RBC AUTO: 58.4 FL (ref 37–54)
ERYTHROCYTE [DISTWIDTH] IN BLOOD BY AUTOMATED COUNT: 16 % (ref 12.3–15.4)
HCT VFR BLD AUTO: 31.7 % (ref 34–46.6)
HGB BLD-MCNC: 9.9 G/DL (ref 12–15.9)
MCH RBC QN AUTO: 30.8 PG (ref 26.6–33)
MCHC RBC AUTO-ENTMCNC: 31.2 G/DL (ref 31.5–35.7)
MCV RBC AUTO: 98.8 FL (ref 79–97)
PLATELET # BLD AUTO: 193 10*3/MM3 (ref 140–450)
PMV BLD AUTO: 10.5 FL (ref 6–12)
RBC # BLD AUTO: 3.21 10*6/MM3 (ref 3.77–5.28)
WBC NRBC COR # BLD: 5.13 10*3/MM3 (ref 3.4–10.8)

## 2019-07-09 PROCEDURE — 96372 THER/PROPH/DIAG INJ SC/IM: CPT

## 2019-07-09 PROCEDURE — 36415 COLL VENOUS BLD VENIPUNCTURE: CPT

## 2019-07-09 PROCEDURE — 85027 COMPLETE CBC AUTOMATED: CPT | Performed by: PHYSICIAN ASSISTANT

## 2019-07-09 PROCEDURE — 25010000002 EPOETIN ALFA PER 1000 UNITS: Performed by: PHYSICIAN ASSISTANT

## 2019-07-09 RX ORDER — FUROSEMIDE 40 MG/1
40 TABLET ORAL DAILY PRN
COMMUNITY
End: 2020-01-01 | Stop reason: HOSPADM

## 2019-07-09 RX ORDER — HYDRALAZINE HYDROCHLORIDE 25 MG/1
25 TABLET, FILM COATED ORAL 2 TIMES DAILY
COMMUNITY
End: 2020-03-11 | Stop reason: ALTCHOICE

## 2019-07-09 RX ADMIN — ERYTHROPOIETIN 40000 UNITS: 40000 INJECTION, SOLUTION INTRAVENOUS; SUBCUTANEOUS at 14:38

## 2019-07-09 NOTE — ADDENDUM NOTE
Encounter addended by: Nadege Contreras RN on: 7/9/2019 3:52 PM   Actions taken: Order Reconciliation Section accessed, Home Medications modified, Medication taking status modified

## 2019-07-17 ENCOUNTER — LAB (OUTPATIENT)
Dept: LAB | Facility: HOSPITAL | Age: 74
End: 2019-07-17

## 2019-07-17 ENCOUNTER — TRANSCRIBE ORDERS (OUTPATIENT)
Dept: LAB | Facility: HOSPITAL | Age: 74
End: 2019-07-17

## 2019-07-17 DIAGNOSIS — I48.91 ATRIAL FIBRILLATION, UNSPECIFIED TYPE (HCC): ICD-10-CM

## 2019-07-17 DIAGNOSIS — N18.4 CHRONIC KIDNEY DISEASE, STAGE IV (SEVERE) (HCC): ICD-10-CM

## 2019-07-17 DIAGNOSIS — N18.4 CHRONIC KIDNEY DISEASE, STAGE IV (SEVERE) (HCC): Primary | ICD-10-CM

## 2019-07-17 LAB
ALBUMIN SERPL-MCNC: 3.6 G/DL (ref 3.5–5)
ANION GAP SERPL CALCULATED.3IONS-SCNC: 10.2 MMOL/L (ref 10–20)
BUN BLD-MCNC: 19 MG/DL (ref 7–20)
BUN/CREAT SERPL: 10 (ref 7.1–23.5)
CALCIUM SPEC-SCNC: 9.2 MG/DL (ref 8.4–10.2)
CHLORIDE SERPL-SCNC: 110 MMOL/L (ref 98–107)
CO2 SERPL-SCNC: 25 MMOL/L (ref 26–30)
CREAT BLD-MCNC: 1.9 MG/DL (ref 0.6–1.3)
DEPRECATED RDW RBC AUTO: 64.2 FL (ref 37–54)
ERYTHROCYTE [DISTWIDTH] IN BLOOD BY AUTOMATED COUNT: 17.5 % (ref 12.3–15.4)
GFR SERPL CREATININE-BSD FRML MDRD: 26 ML/MIN/1.73
GLUCOSE BLD-MCNC: 120 MG/DL (ref 74–98)
HCT VFR BLD AUTO: 35.5 % (ref 34–46.6)
HGB BLD-MCNC: 10.6 G/DL (ref 12–15.9)
MCH RBC QN AUTO: 30.5 PG (ref 26.6–33)
MCHC RBC AUTO-ENTMCNC: 29.9 G/DL (ref 31.5–35.7)
MCV RBC AUTO: 102.3 FL (ref 79–97)
PHOSPHATE SERPL-MCNC: 3.2 MG/DL (ref 2.5–4.5)
PLATELET # BLD AUTO: 211 10*3/MM3 (ref 140–450)
PMV BLD AUTO: 10.3 FL (ref 6–12)
POTASSIUM BLD-SCNC: 4.2 MMOL/L (ref 3.5–5.1)
RBC # BLD AUTO: 3.47 10*6/MM3 (ref 3.77–5.28)
SODIUM BLD-SCNC: 141 MMOL/L (ref 137–145)
URATE SERPL-MCNC: 8.7 MG/DL (ref 2.5–8.5)
WBC NRBC COR # BLD: 3.75 10*3/MM3 (ref 3.4–10.8)

## 2019-07-17 PROCEDURE — 80069 RENAL FUNCTION PANEL: CPT

## 2019-07-17 PROCEDURE — 84550 ASSAY OF BLOOD/URIC ACID: CPT

## 2019-07-17 PROCEDURE — 36415 COLL VENOUS BLD VENIPUNCTURE: CPT

## 2019-07-17 PROCEDURE — 85027 COMPLETE CBC AUTOMATED: CPT

## 2019-07-19 ENCOUNTER — OFFICE VISIT (OUTPATIENT)
Dept: NEUROLOGY | Facility: CLINIC | Age: 74
End: 2019-07-19

## 2019-07-19 VITALS
HEART RATE: 58 BPM | DIASTOLIC BLOOD PRESSURE: 60 MMHG | BODY MASS INDEX: 32.59 KG/M2 | TEMPERATURE: 97.4 F | SYSTOLIC BLOOD PRESSURE: 130 MMHG | WEIGHT: 184 LBS | OXYGEN SATURATION: 98 %

## 2019-07-19 DIAGNOSIS — F02.80 LATE ONSET ALZHEIMER'S DISEASE WITHOUT BEHAVIORAL DISTURBANCE (HCC): Primary | ICD-10-CM

## 2019-07-19 DIAGNOSIS — G30.1 LATE ONSET ALZHEIMER'S DISEASE WITHOUT BEHAVIORAL DISTURBANCE (HCC): Primary | ICD-10-CM

## 2019-07-19 PROCEDURE — 99214 OFFICE O/P EST MOD 30 MIN: CPT | Performed by: PSYCHIATRY & NEUROLOGY

## 2019-07-19 NOTE — PROGRESS NOTES
[facility] NEUROLOGY [place] CONSULTATION   History of Present Illness     Date: 7/19/2019    Patient Identification  Tammy Pascual is a 73 y.o. female.    Patient information was obtained from patient.  History/Exam limitations: none.    CONSULTATION requested by: Sravani Grijalva MD      Chief Complaint   Follow-up (Pt's friend states that pt is here today for a follow up on memory. )      History of Present Illness     Mrs. Pascual is a pleasant 73-year-old female who presents to the clinic for follow-up on memory.  She has medical history significant for Alzheimer's disease with behavioral disturbance subtype.  Her friend is accompanying her today and reports that the patient had 2 episodes within the past couple months during which she had to go to the emergency room.  One was a hypertensive crisis and the other episode of severe dehydration.  He states that the patient was instructed to take furosemide twice daily if she had gained more than 5 pounds.  Patient did not completely understand these instructions and instead was taking furosemide twice daily, regardless of whether or not she had gained weight.  This led to significant dehydration that required hospitalization.      The patient currently lives alone and is visited by her boyfriend and a friend who accompanied her to the clinic today.  We discussed my concerns about medication compliance and whether or not the patient is able to remember when and which medications to take without daily assistance from a caretaker or .  Patient is adamant about not wanting to be placed in an assisted living treatment facility.      Additionally, her friend reports that the patient believes her donepezil to be a sleeping pill and wanted to know if the patient could take her donepezil at a different time of day, to avoid any confusion about its purpose.      Additionally, the friend reports that the patient has had more frequent irritable mood and worries  about the welfare of her cats.  I discussed the mechanism and use of donepezil and reassured the patient that she could take this at any time of the day.      PMH:   Past Medical History:   Diagnosis Date   • Alzheimer's dementia 2019   • Anemia    • Arthritis    • Breast cancer (CMS/HCC)     naveed mastectomy   • Cancer (CMS/HCC)     breast   • Congestive heart failure (CMS/HCC)    • Diabetes mellitus (CMS/HCC)     DIET CONTROLLED   • Disease of thyroid gland    • Hearing loss    • Heart disease    • Hyperlipidemia    • Hypertension    • Kidney disease    • Sleep apnea        Past Surgical History:   Past Surgical History:   Procedure Laterality Date   • BACK SURGERY     • BREAST BIOPSY     • BREAST EXCISIONAL BIOPSY Left 1999   • BREAST RECONSTRUCTION Bilateral    • CAROTID ARTERY ANGIOPLASTY Right     ([facility])   • CATARACT EXTRACTION Bilateral    • COLONOSCOPY N/A 2017    Procedure: COLONOSCOPY;  Surgeon: Sindy Clement MD;  Location: [facility] ENDOSCOPY;  Service:    • HYSTERECTOMY     • MASTECTOMY Bilateral        Family Hisotry:   Family History   Problem Relation Age of Onset   • Cancer Mother         breast   • Diabetes Mother    • Heart disease Father    • Stroke Father    • Hypertension Father    • Breast cancer Maternal Grandmother    • Breast cancer Paternal Grandmother        Social History:   Social History     Socioeconomic History   • Marital status: Single     Spouse name: Not on file   • Number of children: Not on file   • Years of education: Not on file   • Highest education level: Not on file   Tobacco Use   • Smoking status: Former Smoker     Last attempt to quit: 2003     Years since quittin.0   • Smokeless tobacco: Never Used   Substance and Sexual Activity   • Alcohol use: No     Frequency: Never   • Drug use: No   • Sexual activity: Not Currently       Medications:   Current Outpatient Medications   Medication Sig Dispense Refill   •  acetaminophen (TYLENOL) 325 MG tablet Take 650 mg by mouth Every 6 (Six) Hours As Needed for Mild Pain (1-3).     • amiodarone (PACERONE) 200 MG tablet Take 1 tablet by mouth Daily. (Patient taking differently: Take 100 mg by mouth Daily.) 30 tablet 0   • apixaban (ELIQUIS) 5 MG tablet tablet Take 1 tablet by mouth Every 12 (Twelve) Hours. 60 tablet 0   • atorvastatin (LIPITOR) 40 MG tablet Take 40 mg by mouth Every Night.     • carvedilol (COREG) 12.5 MG tablet Take 1 tablet by mouth Every 12 (Twelve) Hours. (Patient taking differently: Take 25 mg by mouth Every 12 (Twelve) Hours.) 60 tablet 0   • Cholecalciferol (VITAMIN D3) 5000 UNITS capsule capsule Take 5,000 Units by mouth Daily.     • clopidogrel (PLAVIX) 75 MG tablet Take 1 tablet by mouth Daily. 30 tablet 0   • donepezil (ARICEPT) 10 MG tablet TAKE 1 TABLET BY MOUTH  EVERY NIGHT 90 tablet 1   • furosemide (LASIX) 40 MG tablet Take 40 mg by mouth 2 (Two) Times a Day. For weight gain of 5 or more pounds-otherwise do not take     • gabapentin (NEURONTIN) 600 MG tablet Take 600 mg by mouth 2 (Two) Times a Day.     • hydrALAZINE (APRESOLINE) 25 MG tablet Take 25 mg by mouth 2 (Two) Times a Day.     • isosorbide mononitrate (IMDUR) 30 MG 24 hr tablet Take 1 tablet by mouth Daily. (Patient taking differently: Take 30 mg by mouth 2 (Two) Times a Day.) 30 tablet 0   • spironolactone (ALDACTONE) 25 MG tablet Take 1 tablet by mouth Daily.     • valsartan (DIOVAN) 160 MG tablet Take 1 tablet by mouth Every 12 (Twelve) Hours. 60 tablet 0     No current facility-administered medications for this visit.        Allergy:   Allergies   Allergen Reactions   • Cephalosporins Anaphylaxis   • Quinolones Anaphylaxis   • Ciprofloxacin    • Keflex [Cephalexin] Hives       Review of Systems:  Review of Systems   Constitutional: Negative for chills and fever.   HENT: Negative for congestion, ear pain, hearing loss, rhinorrhea and sore throat.    Eyes: Negative for pain, discharge and  redness.   Respiratory: Negative for cough, shortness of breath, wheezing and stridor.    Cardiovascular: Negative for chest pain, palpitations and leg swelling.   Gastrointestinal: Negative for abdominal pain, constipation, nausea and vomiting.   Endocrine: Negative for cold intolerance, heat intolerance and polyphagia.   Genitourinary: Negative for dysuria, flank pain, frequency and urgency.   Musculoskeletal: Negative for joint swelling, myalgias, neck pain and neck stiffness.   Skin: Negative for pallor, rash and wound.   Allergic/Immunologic: Negative for environmental allergies.   Neurological: Negative for dizziness, tremors, seizures, syncope, facial asymmetry, speech difficulty, weakness, light-headedness, numbness and headaches.   Hematological: Negative for adenopathy.   Psychiatric/Behavioral: Positive for confusion. Negative for hallucinations. The patient is not nervous/anxious.        A 12 system review is negative except as noted below.    Physical Exam     Vitals:    07/19/19 1611   BP: 130/60   Pulse: 58   Temp: 97.4 °F (36.3 °C)   SpO2: 98%   Weight: 83.5 kg (184 lb)     GENERAL: Patient is pleasant, cooperative, appears to be stated age.  Body habitus is endomorphic.  SKIN AND EXTREMITIES:  No skin rashes or lesions are noted.  No cyanosis, clubbing or edema of the extremities.    HEAD:  Head is normocephalic and atraumatic.    NECK: Nontender without thyromegaly or adenopathy.  Carotid upstrokes are 1+/4.  No cranial or cervical bruits.  The neck is supple with a full range of motion.   ENT: Palate elevates symmetrically.  No evidence of high arch palate.  Tongue midline.  No erythema in posterior pharynx.  Mallampati Classification Class III   CARDIOVASCULAR:  Regular rate and rhythm with normal S1 and S2 without rub or gallop.  RESPIRATORY:  Clear to auscultation without wheezes or crackle   ABDOMEN:  Soft and nontender, positive bowel sound without hepatosplenomegaly  BACK:  Back is straight  without midline defect.    PSYCH:  Higher cortical function/mental status:  The patient is alert.  The patient is oriented x3 to time, place and person.  Recent and the remote memory appear normal.  The patient has a good fund of knowledge.  There is no visual or auditory hallucination or suicidal or homicidal ideation.  SPEECH:There is no gross evidence of aphasia, dysarthria or agnosia.      CRANIAL NERVES:  Pupils are 4mm, equal round reactive to light, reacting briskly to 2mm without afferent pupillary defect.  Visual fields are intact to confrontation testing.  Funduscopic examination reveals sharp disc margins with normal vasculature.  No papilledema, hemorrhages or exudates.  Extraocular movements are full and smooth with normal pursuits and saccades.  No nystagmus noted.  The face is symmetric. Palate elevates symmetrically, Tongue midline, positive gag reflex. The remainder of the cranial nerves are intact and symmetrical.    MOTOR: Strength is 5/5 throughout with normal tone and bulk with the following exceptions, 4/5 intrinsic muscles of the hands and feet.  No involuntary movements noted.    Deep Tendon Reflexes: are 2/4 and symmetrical in the upper extremities, 2/4 and symmetrical at the knees and 1/4 and symmetrical at the Achilles tendon.  Plantar responses were down-going bilaterally.    SENSATION:  Intact to pinprick, light touch, vibration and proprioception.  Coordination:  The patient normally performs finger-nose-finger, heel-to-knee-to-shin and rapid alternating movements in symmetrical fashion.    COORDINATION AND GAIT:  The patient walks with a narrow-based gait.  Patient is able to heel-toe and tandem walk forward and backwards without difficulty.  Romberg and monopedal  Romberg are negative.    MUSCULOSKELETAL: Range of motion normal, no clubbing, cyanosis, or edema.  No joint swelling.            Records Reviewed: I have personally reviewed her previous medical record.    Tammy was seen  today for follow-up.    Diagnoses and all orders for this visit:    Late onset Alzheimer's disease without behavioral disturbance        Discussion:    Assessment and plan: Patient is a 73-year-old female with a history of Alzheimer's disease presents for follow-up on memory.    Memory loss: The patient's friend reports significant improvement of the patient's symptoms and believes that she is doing much better since the 2 hospitalizations occurred earlier this year.  I informed the patient and her friend, who is responsible for ionizing the patient's daily medication regimen, that donepezil can be taken anytime of the day due to its 24-hour half-life.   -Donepezil 10 mg tablet; take 1 tablet by mouth daily.     Alzheimer's Disease  I have counseled patient extensively on the pathophysiology of Alzheimer's disease. Alzheimer's disease is becoming an increasingly bigger concern over the next 50 years. The incidence of Alzheimer's is expected to quadruple, affecting one in 45 Americans.   We have also discussed several different types of medications are available to treat the memory loss, behavior changes, sleep problem of Alzheimer's disease. Three cholinesterase inhibitors are approved for Alzheimer's disease therapy: Donepezil (Aricept) is approved to treat mild, moderate, and severe Alzheimer's. Rivastigminen (Exelon) and galantamine (Razadyne) are approved to treat mild to moderate Alzheimer's.  Exelon is now also available in a skin patch, which is easier for some patients to use and is slowly released throughout the day.  Side effects of the cholinesterase inhibitors include nausea, vomiting, diarrhea, weight loss, and dizziness.  Memantine (Namenda) works by regulating the amount of glutamate in the brain. Namenda is approved for moderate to severe Alzheimer's disease. Side effects include dizziness, confusion, headache, constipation, nausea, and agitation. Because Namenda does not work the same way as a  cholinesterase inhibitor, it may be used in combination with one.  Alternative therapies have been discussed including sensory therapies such as music therapy and art therapy can improve Alzheimer's patients' mood, behavior, and day-to-day function. By stimulating the senses, these therapies may help trigger memory recall and enable Alzheimer's patients to reconnect with the world around them. Other alternative remedies, including coenzyme Q10, coral calcium , huperzine A, and omega-3 fatty acids to prevent or treat Alzheimer's disease have been mentioned during counseling. However, there is not yet enough research on these treatments to recommend using any of them as an Alzheimer's therapy.     This Document is signed by Yonatan You MD, FAAN, FAASM July 19, 201911:35 PM

## 2019-07-22 RX ORDER — DONEPEZIL HYDROCHLORIDE 10 MG/1
10 TABLET, FILM COATED ORAL NIGHTLY
Qty: 90 TABLET | Refills: 1 | Status: SHIPPED | OUTPATIENT
Start: 2019-07-22 | End: 2021-01-01 | Stop reason: HOSPADM

## 2019-08-06 ENCOUNTER — HOSPITAL ENCOUNTER (OUTPATIENT)
Dept: INFUSION THERAPY | Facility: HOSPITAL | Age: 74
Discharge: HOME | End: 2019-08-06
Admitting: PHYSICIAN ASSISTANT

## 2019-08-06 VITALS
OXYGEN SATURATION: 98 % | SYSTOLIC BLOOD PRESSURE: 148 MMHG | HEART RATE: 59 BPM | RESPIRATION RATE: 18 BRPM | TEMPERATURE: 98.1 F | DIASTOLIC BLOOD PRESSURE: 45 MMHG

## 2019-08-06 DIAGNOSIS — N18.4 CHRONIC KIDNEY DISEASE, STAGE IV (SEVERE) (HCC): ICD-10-CM

## 2019-08-06 DIAGNOSIS — D50.9 IRON DEFICIENCY ANEMIA, UNSPECIFIED IRON DEFICIENCY ANEMIA TYPE: Primary | ICD-10-CM

## 2019-08-06 LAB
DEPRECATED RDW RBC AUTO: 54.4 FL (ref 37–54)
ERYTHROCYTE [DISTWIDTH] IN BLOOD BY AUTOMATED COUNT: 14.6 % (ref 12.3–15.4)
HCT VFR BLD AUTO: 35.3 % (ref 34–46.6)
HGB BLD-MCNC: 10.6 G/DL (ref 12–15.9)
MCH RBC QN AUTO: 30.5 PG (ref 26.6–33)
MCHC RBC AUTO-ENTMCNC: 30 G/DL (ref 31.5–35.7)
MCV RBC AUTO: 101.7 FL (ref 79–97)
PLATELET # BLD AUTO: 178 10*3/MM3 (ref 140–450)
PMV BLD AUTO: 10.6 FL (ref 6–12)
RBC # BLD AUTO: 3.47 10*6/MM3 (ref 3.77–5.28)
WBC NRBC COR # BLD: 4.74 10*3/MM3 (ref 3.4–10.8)

## 2019-08-06 PROCEDURE — 85027 COMPLETE CBC AUTOMATED: CPT | Performed by: PHYSICIAN ASSISTANT

## 2019-08-06 PROCEDURE — G0463 HOSPITAL OUTPT CLINIC VISIT: HCPCS

## 2019-08-06 PROCEDURE — 36415 COLL VENOUS BLD VENIPUNCTURE: CPT

## 2019-08-08 ENCOUNTER — TRANSCRIBE ORDERS (OUTPATIENT)
Dept: ADMINISTRATIVE | Facility: HOSPITAL | Age: 74
End: 2019-08-08

## 2019-08-08 ENCOUNTER — TRANSCRIBE ORDERS (OUTPATIENT)
Dept: GENERAL RADIOLOGY | Facility: HOSPITAL | Age: 74
End: 2019-08-08

## 2019-08-08 ENCOUNTER — HOSPITAL ENCOUNTER (OUTPATIENT)
Dept: GENERAL RADIOLOGY | Facility: HOSPITAL | Age: 74
Discharge: HOME | End: 2019-08-08
Admitting: FAMILY MEDICINE

## 2019-08-08 DIAGNOSIS — M25.552 LEFT HIP PAIN: Primary | ICD-10-CM

## 2019-08-08 PROCEDURE — 73502 X-RAY EXAM HIP UNI 2-3 VIEWS: CPT

## 2019-09-03 ENCOUNTER — APPOINTMENT (OUTPATIENT)
Dept: INFUSION THERAPY | Facility: HOSPITAL | Age: 74
End: 2019-09-03

## 2019-09-20 ENCOUNTER — TRANSCRIBE ORDERS (OUTPATIENT)
Dept: LAB | Facility: HOSPITAL | Age: 74
End: 2019-09-20

## 2019-09-20 ENCOUNTER — LAB (OUTPATIENT)
Dept: LAB | Facility: HOSPITAL | Age: 74
End: 2019-09-20

## 2019-09-20 DIAGNOSIS — N18.4 CHRONIC KIDNEY DISEASE, STAGE IV (SEVERE) (HCC): Primary | ICD-10-CM

## 2019-09-20 DIAGNOSIS — N18.4 CHRONIC KIDNEY DISEASE, STAGE IV (SEVERE) (HCC): ICD-10-CM

## 2019-09-20 LAB
ALBUMIN SERPL-MCNC: 4 G/DL (ref 3.5–5.2)
ANION GAP SERPL CALCULATED.3IONS-SCNC: 15 MMOL/L (ref 5–15)
BASOPHILS # BLD AUTO: 0.03 10*3/MM3 (ref 0–0.2)
BASOPHILS NFR BLD AUTO: 0.7 % (ref 0–1.5)
BUN BLD-MCNC: 23 MG/DL (ref 8–23)
BUN/CREAT SERPL: 10.5 (ref 7–25)
CALCIUM SPEC-SCNC: 10 MG/DL (ref 8.6–10.5)
CHLORIDE SERPL-SCNC: 104 MMOL/L (ref 98–107)
CO2 SERPL-SCNC: 21 MMOL/L (ref 22–29)
CREAT BLD-MCNC: 2.2 MG/DL (ref 0.57–1)
DEPRECATED RDW RBC AUTO: 46.3 FL (ref 37–54)
EOSINOPHIL # BLD AUTO: 0.06 10*3/MM3 (ref 0–0.4)
EOSINOPHIL NFR BLD AUTO: 1.4 % (ref 0.3–6.2)
ERYTHROCYTE [DISTWIDTH] IN BLOOD BY AUTOMATED COUNT: 13.5 % (ref 12.3–15.4)
GFR SERPL CREATININE-BSD FRML MDRD: 22 ML/MIN/1.73
GLUCOSE BLD-MCNC: 101 MG/DL (ref 65–99)
HCT VFR BLD AUTO: 29.6 % (ref 34–46.6)
HGB BLD-MCNC: 9.6 G/DL (ref 12–15.9)
IMM GRANULOCYTES # BLD AUTO: 0.01 10*3/MM3 (ref 0–0.05)
IMM GRANULOCYTES NFR BLD AUTO: 0.2 % (ref 0–0.5)
LYMPHOCYTES # BLD AUTO: 0.99 10*3/MM3 (ref 0.7–3.1)
LYMPHOCYTES NFR BLD AUTO: 22.9 % (ref 19.6–45.3)
MCH RBC QN AUTO: 30.4 PG (ref 26.6–33)
MCHC RBC AUTO-ENTMCNC: 32.4 G/DL (ref 31.5–35.7)
MCV RBC AUTO: 93.7 FL (ref 79–97)
MONOCYTES # BLD AUTO: 0.39 10*3/MM3 (ref 0.1–0.9)
MONOCYTES NFR BLD AUTO: 9 % (ref 5–12)
NEUTROPHILS # BLD AUTO: 2.84 10*3/MM3 (ref 1.7–7)
NEUTROPHILS NFR BLD AUTO: 65.8 % (ref 42.7–76)
NRBC BLD AUTO-RTO: 0 /100 WBC (ref 0–0.2)
PHOSPHATE SERPL-MCNC: 4.1 MG/DL (ref 2.5–4.5)
PLATELET # BLD AUTO: 210 10*3/MM3 (ref 140–450)
PMV BLD AUTO: 11.2 FL (ref 6–12)
POTASSIUM BLD-SCNC: 5 MMOL/L (ref 3.5–5.2)
RBC # BLD AUTO: 3.16 10*6/MM3 (ref 3.77–5.28)
SODIUM BLD-SCNC: 140 MMOL/L (ref 136–145)
URATE SERPL-MCNC: 6.4 MG/DL (ref 2.4–5.7)
WBC NRBC COR # BLD: 4.32 10*3/MM3 (ref 3.4–10.8)

## 2019-09-20 PROCEDURE — 36415 COLL VENOUS BLD VENIPUNCTURE: CPT

## 2019-09-20 PROCEDURE — 84550 ASSAY OF BLOOD/URIC ACID: CPT

## 2019-09-20 PROCEDURE — 85025 COMPLETE CBC W/AUTO DIFF WBC: CPT

## 2019-09-20 PROCEDURE — 80069 RENAL FUNCTION PANEL: CPT

## 2019-09-27 ENCOUNTER — HOSPITAL ENCOUNTER (OUTPATIENT)
Dept: INFUSION THERAPY | Facility: HOSPITAL | Age: 74
Discharge: HOME | End: 2019-09-27
Admitting: PHYSICIAN ASSISTANT

## 2019-09-27 VITALS
DIASTOLIC BLOOD PRESSURE: 52 MMHG | TEMPERATURE: 97.7 F | RESPIRATION RATE: 18 BRPM | OXYGEN SATURATION: 98 % | HEART RATE: 52 BPM | SYSTOLIC BLOOD PRESSURE: 124 MMHG

## 2019-09-27 DIAGNOSIS — D50.9 IRON DEFICIENCY ANEMIA, UNSPECIFIED IRON DEFICIENCY ANEMIA TYPE: Primary | ICD-10-CM

## 2019-09-27 DIAGNOSIS — N18.4 CHRONIC KIDNEY DISEASE, STAGE IV (SEVERE) (HCC): ICD-10-CM

## 2019-09-27 LAB
DEPRECATED RDW RBC AUTO: 52.3 FL (ref 37–54)
ERYTHROCYTE [DISTWIDTH] IN BLOOD BY AUTOMATED COUNT: 14.4 % (ref 12.3–15.4)
HCT VFR BLD AUTO: 27.7 % (ref 34–46.6)
HGB BLD-MCNC: 8.4 G/DL (ref 12–15.9)
MCH RBC QN AUTO: 30 PG (ref 26.6–33)
MCHC RBC AUTO-ENTMCNC: 30.3 G/DL (ref 31.5–35.7)
MCV RBC AUTO: 98.9 FL (ref 79–97)
PLATELET # BLD AUTO: 176 10*3/MM3 (ref 140–450)
PMV BLD AUTO: 10.6 FL (ref 6–12)
RBC # BLD AUTO: 2.8 10*6/MM3 (ref 3.77–5.28)
WBC NRBC COR # BLD: 3.56 10*3/MM3 (ref 3.4–10.8)

## 2019-09-27 PROCEDURE — 96372 THER/PROPH/DIAG INJ SC/IM: CPT

## 2019-09-27 PROCEDURE — 85027 COMPLETE CBC AUTOMATED: CPT | Performed by: PHYSICIAN ASSISTANT

## 2019-09-27 PROCEDURE — 25010000002 EPOETIN ALFA PER 1000 UNITS: Performed by: PHYSICIAN ASSISTANT

## 2019-09-27 PROCEDURE — 36415 COLL VENOUS BLD VENIPUNCTURE: CPT

## 2019-09-27 RX ADMIN — ERYTHROPOIETIN 40000 UNITS: 40000 INJECTION, SOLUTION INTRAVENOUS; SUBCUTANEOUS at 14:10

## 2019-10-01 ENCOUNTER — TRANSCRIBE ORDERS (OUTPATIENT)
Dept: ADMINISTRATIVE | Facility: HOSPITAL | Age: 74
End: 2019-10-01

## 2019-10-01 DIAGNOSIS — I48.91 ATRIAL FIBRILLATION, UNSPECIFIED TYPE (HCC): ICD-10-CM

## 2019-10-01 DIAGNOSIS — Z79.899 ENCOUNTER FOR LONG-TERM (CURRENT) USE OF OTHER MEDICATIONS: ICD-10-CM

## 2019-10-01 DIAGNOSIS — R06.02 SHORTNESS OF BREATH: Primary | ICD-10-CM

## 2019-10-14 ENCOUNTER — HOSPITAL ENCOUNTER (OUTPATIENT)
Dept: PULMONOLOGY | Facility: HOSPITAL | Age: 74
Discharge: HOME | End: 2019-10-14
Admitting: INTERNAL MEDICINE

## 2019-10-14 ENCOUNTER — TRANSCRIBE ORDERS (OUTPATIENT)
Dept: GENERAL RADIOLOGY | Facility: HOSPITAL | Age: 74
End: 2019-10-14

## 2019-10-14 ENCOUNTER — HOSPITAL ENCOUNTER (OUTPATIENT)
Dept: GENERAL RADIOLOGY | Facility: HOSPITAL | Age: 74
Discharge: HOME | End: 2019-10-14

## 2019-10-14 DIAGNOSIS — Z79.899 ENCOUNTER FOR LONG-TERM (CURRENT) USE OF OTHER MEDICATIONS: ICD-10-CM

## 2019-10-14 DIAGNOSIS — I48.91 ATRIAL FIBRILLATION, UNSPECIFIED TYPE (HCC): ICD-10-CM

## 2019-10-14 DIAGNOSIS — R06.02 SHORTNESS OF BREATH: ICD-10-CM

## 2019-10-14 DIAGNOSIS — I48.11 LONGSTANDING PERSISTENT ATRIAL FIBRILLATION (HCC): ICD-10-CM

## 2019-10-14 DIAGNOSIS — R06.02 SHORTNESS OF BREATH: Primary | ICD-10-CM

## 2019-10-14 PROCEDURE — 94060 EVALUATION OF WHEEZING: CPT

## 2019-10-14 PROCEDURE — 94640 AIRWAY INHALATION TREATMENT: CPT

## 2019-10-14 PROCEDURE — 94729 DIFFUSING CAPACITY: CPT

## 2019-10-14 PROCEDURE — 71046 X-RAY EXAM CHEST 2 VIEWS: CPT

## 2019-10-14 PROCEDURE — 94726 PLETHYSMOGRAPHY LUNG VOLUMES: CPT

## 2019-10-14 RX ORDER — ALBUTEROL SULFATE 2.5 MG/3ML
2.5 SOLUTION RESPIRATORY (INHALATION) ONCE
Status: COMPLETED | OUTPATIENT
Start: 2019-10-14 | End: 2019-10-14

## 2019-10-14 RX ADMIN — ALBUTEROL SULFATE 2.5 MG: 2.5 SOLUTION RESPIRATORY (INHALATION) at 13:07

## 2019-10-25 ENCOUNTER — TRANSCRIBE ORDERS (OUTPATIENT)
Dept: ADMINISTRATIVE | Facility: HOSPITAL | Age: 74
End: 2019-10-25

## 2019-10-25 DIAGNOSIS — R06.02 SHORTNESS OF BREATH: Primary | ICD-10-CM

## 2019-10-30 ENCOUNTER — HOSPITAL ENCOUNTER (OUTPATIENT)
Dept: CT IMAGING | Facility: HOSPITAL | Age: 74
Discharge: HOME | End: 2019-10-30
Admitting: INTERNAL MEDICINE

## 2019-10-30 DIAGNOSIS — R06.02 SHORTNESS OF BREATH: ICD-10-CM

## 2019-10-30 PROCEDURE — 71250 CT THORAX DX C-: CPT

## 2019-11-22 ENCOUNTER — LAB (OUTPATIENT)
Dept: LAB | Facility: HOSPITAL | Age: 74
End: 2019-11-22

## 2019-11-22 ENCOUNTER — TRANSCRIBE ORDERS (OUTPATIENT)
Dept: LAB | Facility: HOSPITAL | Age: 74
End: 2019-11-22

## 2019-11-22 DIAGNOSIS — N18.4 STAGE 4 CHRONIC KIDNEY DISEASE (HCC): ICD-10-CM

## 2019-11-22 DIAGNOSIS — N18.4 STAGE 4 CHRONIC KIDNEY DISEASE (HCC): Primary | ICD-10-CM

## 2019-11-22 DIAGNOSIS — I48.91 ATRIAL FIBRILLATION, UNSPECIFIED TYPE (HCC): ICD-10-CM

## 2019-11-22 LAB
ALBUMIN SERPL-MCNC: 3.8 G/DL (ref 3.5–5.2)
ANION GAP SERPL CALCULATED.3IONS-SCNC: 11.2 MMOL/L (ref 5–15)
BASOPHILS # BLD AUTO: 0.02 10*3/MM3 (ref 0–0.2)
BASOPHILS NFR BLD AUTO: 0.5 % (ref 0–1.5)
BUN BLD-MCNC: 34 MG/DL (ref 8–23)
BUN/CREAT SERPL: 13.9 (ref 7–25)
CALCIUM SPEC-SCNC: 9.5 MG/DL (ref 8.6–10.5)
CHLORIDE SERPL-SCNC: 105 MMOL/L (ref 98–107)
CO2 SERPL-SCNC: 25.8 MMOL/L (ref 22–29)
CREAT BLD-MCNC: 2.44 MG/DL (ref 0.57–1)
DEPRECATED RDW RBC AUTO: 48.4 FL (ref 37–54)
EOSINOPHIL # BLD AUTO: 0.08 10*3/MM3 (ref 0–0.4)
EOSINOPHIL NFR BLD AUTO: 2.1 % (ref 0.3–6.2)
ERYTHROCYTE [DISTWIDTH] IN BLOOD BY AUTOMATED COUNT: 14.2 % (ref 12.3–15.4)
GFR SERPL CREATININE-BSD FRML MDRD: 19 ML/MIN/1.73
GLUCOSE BLD-MCNC: 109 MG/DL (ref 65–99)
HCT VFR BLD AUTO: 26.7 % (ref 34–46.6)
HGB BLD-MCNC: 8.9 G/DL (ref 12–15.9)
IMM GRANULOCYTES # BLD AUTO: 0.01 10*3/MM3 (ref 0–0.05)
IMM GRANULOCYTES NFR BLD AUTO: 0.3 % (ref 0–0.5)
IRON 24H UR-MRATE: 56 MCG/DL (ref 37–145)
IRON SATN MFR SERPL: 21 % (ref 20–50)
LYMPHOCYTES # BLD AUTO: 1.03 10*3/MM3 (ref 0.7–3.1)
LYMPHOCYTES NFR BLD AUTO: 26.8 % (ref 19.6–45.3)
MCH RBC QN AUTO: 31.1 PG (ref 26.6–33)
MCHC RBC AUTO-ENTMCNC: 33.3 G/DL (ref 31.5–35.7)
MCV RBC AUTO: 93.4 FL (ref 79–97)
MONOCYTES # BLD AUTO: 0.35 10*3/MM3 (ref 0.1–0.9)
MONOCYTES NFR BLD AUTO: 9.1 % (ref 5–12)
NEUTROPHILS # BLD AUTO: 2.36 10*3/MM3 (ref 1.7–7)
NEUTROPHILS NFR BLD AUTO: 61.2 % (ref 42.7–76)
NRBC BLD AUTO-RTO: 0 /100 WBC (ref 0–0.2)
PHOSPHATE SERPL-MCNC: 4.1 MG/DL (ref 2.5–4.5)
PLATELET # BLD AUTO: 193 10*3/MM3 (ref 140–450)
PMV BLD AUTO: 11.3 FL (ref 6–12)
POTASSIUM BLD-SCNC: 4.7 MMOL/L (ref 3.5–5.2)
RBC # BLD AUTO: 2.86 10*6/MM3 (ref 3.77–5.28)
SODIUM BLD-SCNC: 142 MMOL/L (ref 136–145)
TIBC SERPL-MCNC: 268 MCG/DL (ref 298–536)
TRANSFERRIN SERPL-MCNC: 180 MG/DL (ref 200–360)
URATE SERPL-MCNC: 7.9 MG/DL (ref 2.4–5.7)
WBC NRBC COR # BLD: 3.85 10*3/MM3 (ref 3.4–10.8)

## 2019-11-22 PROCEDURE — 36415 COLL VENOUS BLD VENIPUNCTURE: CPT

## 2019-11-22 PROCEDURE — 83540 ASSAY OF IRON: CPT

## 2019-11-22 PROCEDURE — 83970 ASSAY OF PARATHORMONE: CPT

## 2019-11-22 PROCEDURE — 80069 RENAL FUNCTION PANEL: CPT

## 2019-11-22 PROCEDURE — 85025 COMPLETE CBC W/AUTO DIFF WBC: CPT

## 2019-11-22 PROCEDURE — 84550 ASSAY OF BLOOD/URIC ACID: CPT

## 2019-11-22 PROCEDURE — 84466 ASSAY OF TRANSFERRIN: CPT

## 2019-11-23 LAB — PTH-INTACT SERPL-MCNC: 105 PG/ML (ref 15–65)

## 2019-12-03 ENCOUNTER — HOSPITAL ENCOUNTER (OUTPATIENT)
Dept: INFUSION THERAPY | Facility: HOSPITAL | Age: 74
Discharge: HOME | End: 2019-12-03
Admitting: PHYSICIAN ASSISTANT

## 2019-12-03 VITALS
TEMPERATURE: 97.2 F | DIASTOLIC BLOOD PRESSURE: 70 MMHG | HEART RATE: 47 BPM | OXYGEN SATURATION: 100 % | SYSTOLIC BLOOD PRESSURE: 158 MMHG | RESPIRATION RATE: 18 BRPM

## 2019-12-03 DIAGNOSIS — D50.9 IRON DEFICIENCY ANEMIA, UNSPECIFIED IRON DEFICIENCY ANEMIA TYPE: Primary | ICD-10-CM

## 2019-12-03 DIAGNOSIS — N18.4 CHRONIC KIDNEY DISEASE, STAGE IV (SEVERE) (HCC): ICD-10-CM

## 2019-12-03 LAB
DEPRECATED RDW RBC AUTO: 53.6 FL (ref 37–54)
ERYTHROCYTE [DISTWIDTH] IN BLOOD BY AUTOMATED COUNT: 14.6 % (ref 12.3–15.4)
HCT VFR BLD AUTO: 30 % (ref 34–46.6)
HGB BLD-MCNC: 9.4 G/DL (ref 12–15.9)
MCH RBC QN AUTO: 31.1 PG (ref 26.6–33)
MCHC RBC AUTO-ENTMCNC: 31.3 G/DL (ref 31.5–35.7)
MCV RBC AUTO: 99.3 FL (ref 79–97)
PLATELET # BLD AUTO: 177 10*3/MM3 (ref 140–450)
PMV BLD AUTO: 10.6 FL (ref 6–12)
RBC # BLD AUTO: 3.02 10*6/MM3 (ref 3.77–5.28)
WBC NRBC COR # BLD: 3.84 10*3/MM3 (ref 3.4–10.8)

## 2019-12-03 PROCEDURE — 85027 COMPLETE CBC AUTOMATED: CPT | Performed by: INTERNAL MEDICINE

## 2019-12-03 PROCEDURE — 96372 THER/PROPH/DIAG INJ SC/IM: CPT

## 2019-12-03 PROCEDURE — 36415 COLL VENOUS BLD VENIPUNCTURE: CPT

## 2019-12-03 PROCEDURE — 25010000002 EPOETIN ALFA PER 1000 UNITS: Performed by: PHYSICIAN ASSISTANT

## 2019-12-03 RX ADMIN — ERYTHROPOIETIN 40000 UNITS: 40000 INJECTION, SOLUTION INTRAVENOUS; SUBCUTANEOUS at 10:50

## 2019-12-17 ENCOUNTER — HOSPITAL ENCOUNTER (OUTPATIENT)
Dept: INFUSION THERAPY | Facility: HOSPITAL | Age: 74
Setting detail: INFUSION SERIES
Discharge: HOME | End: 2019-12-17

## 2019-12-17 VITALS
DIASTOLIC BLOOD PRESSURE: 44 MMHG | SYSTOLIC BLOOD PRESSURE: 167 MMHG | RESPIRATION RATE: 18 BRPM | OXYGEN SATURATION: 100 % | BODY MASS INDEX: 28.17 KG/M2 | HEIGHT: 64 IN | HEART RATE: 50 BPM | TEMPERATURE: 97 F | WEIGHT: 165 LBS

## 2019-12-17 DIAGNOSIS — D50.9 IRON DEFICIENCY ANEMIA, UNSPECIFIED IRON DEFICIENCY ANEMIA TYPE: Primary | ICD-10-CM

## 2019-12-17 DIAGNOSIS — N18.4 CHRONIC KIDNEY DISEASE, STAGE IV (SEVERE) (HCC): ICD-10-CM

## 2019-12-17 LAB
DEPRECATED RDW RBC AUTO: 53.6 FL (ref 37–54)
ERYTHROCYTE [DISTWIDTH] IN BLOOD BY AUTOMATED COUNT: 14.9 % (ref 12.3–15.4)
HCT VFR BLD AUTO: 34.2 % (ref 34–46.6)
HGB BLD-MCNC: 10.4 G/DL (ref 12–15.9)
MCH RBC QN AUTO: 30.3 PG (ref 26.6–33)
MCHC RBC AUTO-ENTMCNC: 30.4 G/DL (ref 31.5–35.7)
MCV RBC AUTO: 99.7 FL (ref 79–97)
PLATELET # BLD AUTO: 210 10*3/MM3 (ref 140–450)
PMV BLD AUTO: 10.4 FL (ref 6–12)
RBC # BLD AUTO: 3.43 10*6/MM3 (ref 3.77–5.28)
WBC NRBC COR # BLD: 4.49 10*3/MM3 (ref 3.4–10.8)

## 2019-12-17 PROCEDURE — 85027 COMPLETE CBC AUTOMATED: CPT | Performed by: FAMILY MEDICINE

## 2019-12-17 PROCEDURE — 36415 COLL VENOUS BLD VENIPUNCTURE: CPT

## 2020-01-01 ENCOUNTER — LAB (OUTPATIENT)
Dept: LAB | Facility: HOSPITAL | Age: 75
End: 2020-01-01

## 2020-01-01 ENCOUNTER — NURSING HOME (OUTPATIENT)
Dept: INTERNAL MEDICINE | Facility: CLINIC | Age: 75
End: 2020-01-01

## 2020-01-01 ENCOUNTER — TELEPHONE (OUTPATIENT)
Dept: INFUSION THERAPY | Facility: HOSPITAL | Age: 75
End: 2020-01-01

## 2020-01-01 ENCOUNTER — TRANSCRIBE ORDERS (OUTPATIENT)
Dept: LAB | Facility: HOSPITAL | Age: 75
End: 2020-01-01

## 2020-01-01 ENCOUNTER — HOSPITAL ENCOUNTER (OUTPATIENT)
Dept: INFUSION THERAPY | Facility: HOSPITAL | Age: 75
Setting detail: INFUSION SERIES
Discharge: HOME | End: 2020-07-30

## 2020-01-01 ENCOUNTER — HOSPITAL ENCOUNTER (EMERGENCY)
Facility: HOSPITAL | Age: 75
Discharge: NURSING, REHAB OR LONG-TERM CARE | End: 2020-07-02
Attending: STUDENT IN AN ORGANIZED HEALTH CARE EDUCATION/TRAINING PROGRAM | Admitting: STUDENT IN AN ORGANIZED HEALTH CARE EDUCATION/TRAINING PROGRAM

## 2020-01-01 ENCOUNTER — TRANSCRIBE ORDERS (OUTPATIENT)
Dept: INFUSION THERAPY | Facility: HOSPITAL | Age: 75
End: 2020-01-01

## 2020-01-01 ENCOUNTER — APPOINTMENT (OUTPATIENT)
Dept: GENERAL RADIOLOGY | Facility: HOSPITAL | Age: 75
End: 2020-01-01

## 2020-01-01 ENCOUNTER — HOSPITAL ENCOUNTER (OUTPATIENT)
Dept: INFUSION THERAPY | Facility: HOSPITAL | Age: 75
Setting detail: INFUSION SERIES
Discharge: HOME | End: 2020-09-02

## 2020-01-01 ENCOUNTER — TELEPHONE (OUTPATIENT)
Dept: ONCOLOGY | Facility: CLINIC | Age: 75
End: 2020-01-01

## 2020-01-01 ENCOUNTER — APPOINTMENT (OUTPATIENT)
Dept: INFUSION THERAPY | Facility: HOSPITAL | Age: 75
End: 2020-01-01

## 2020-01-01 ENCOUNTER — HOSPITAL ENCOUNTER (OUTPATIENT)
Dept: INFUSION THERAPY | Facility: HOSPITAL | Age: 75
Setting detail: INFUSION SERIES
Discharge: HOME | End: 2020-07-02

## 2020-01-01 ENCOUNTER — HOSPITAL ENCOUNTER (OUTPATIENT)
Facility: HOSPITAL | Age: 75
Setting detail: OBSERVATION
Discharge: NURSING, REHAB OR LONG-TERM CARE | End: 2020-11-17
Attending: EMERGENCY MEDICINE | Admitting: INTERNAL MEDICINE

## 2020-01-01 ENCOUNTER — HOSPITAL ENCOUNTER (OUTPATIENT)
Dept: INFUSION THERAPY | Facility: HOSPITAL | Age: 75
Setting detail: INFUSION SERIES
Discharge: HOME | End: 2020-06-03

## 2020-01-01 ENCOUNTER — HOSPITAL ENCOUNTER (EMERGENCY)
Facility: HOSPITAL | Age: 75
Discharge: HOME | End: 2020-07-08
Attending: EMERGENCY MEDICINE | Admitting: EMERGENCY MEDICINE

## 2020-01-01 ENCOUNTER — OFFICE VISIT (OUTPATIENT)
Dept: ONCOLOGY | Facility: CLINIC | Age: 75
End: 2020-01-01

## 2020-01-01 ENCOUNTER — TRANSCRIBE ORDERS (OUTPATIENT)
Dept: ADMINISTRATIVE | Facility: HOSPITAL | Age: 75
End: 2020-01-01

## 2020-01-01 ENCOUNTER — HOSPITAL ENCOUNTER (OUTPATIENT)
Dept: INFUSION THERAPY | Facility: HOSPITAL | Age: 75
Setting detail: INFUSION SERIES
Discharge: HOME | End: 2020-05-06

## 2020-01-01 ENCOUNTER — HOSPITAL ENCOUNTER (EMERGENCY)
Facility: HOSPITAL | Age: 75
Discharge: HOME | End: 2020-11-12
Attending: EMERGENCY MEDICINE | Admitting: EMERGENCY MEDICINE

## 2020-01-01 ENCOUNTER — HOSPITAL ENCOUNTER (OUTPATIENT)
Dept: INFUSION THERAPY | Facility: HOSPITAL | Age: 75
Setting detail: INFUSION SERIES
Discharge: HOME | End: 2020-09-09

## 2020-01-01 ENCOUNTER — HOSPITAL ENCOUNTER (EMERGENCY)
Facility: HOSPITAL | Age: 75
Discharge: HOME | End: 2020-12-29
Attending: EMERGENCY MEDICINE | Admitting: EMERGENCY MEDICINE

## 2020-01-01 ENCOUNTER — APPOINTMENT (OUTPATIENT)
Dept: CT IMAGING | Facility: HOSPITAL | Age: 75
End: 2020-01-01

## 2020-01-01 VITALS
OXYGEN SATURATION: 94 % | TEMPERATURE: 97.2 F | DIASTOLIC BLOOD PRESSURE: 62 MMHG | HEART RATE: 48 BPM | SYSTOLIC BLOOD PRESSURE: 146 MMHG | RESPIRATION RATE: 16 BRPM

## 2020-01-01 VITALS
DIASTOLIC BLOOD PRESSURE: 61 MMHG | RESPIRATION RATE: 18 BRPM | OXYGEN SATURATION: 100 % | TEMPERATURE: 97.8 F | HEART RATE: 54 BPM | SYSTOLIC BLOOD PRESSURE: 128 MMHG

## 2020-01-01 VITALS
TEMPERATURE: 97.7 F | DIASTOLIC BLOOD PRESSURE: 78 MMHG | HEART RATE: 55 BPM | OXYGEN SATURATION: 99 % | SYSTOLIC BLOOD PRESSURE: 238 MMHG | RESPIRATION RATE: 18 BRPM

## 2020-01-01 VITALS
SYSTOLIC BLOOD PRESSURE: 182 MMHG | RESPIRATION RATE: 16 BRPM | HEIGHT: 65 IN | BODY MASS INDEX: 28.32 KG/M2 | WEIGHT: 170 LBS | OXYGEN SATURATION: 99 % | HEART RATE: 53 BPM | TEMPERATURE: 98.2 F | DIASTOLIC BLOOD PRESSURE: 67 MMHG

## 2020-01-01 VITALS
OXYGEN SATURATION: 95 % | SYSTOLIC BLOOD PRESSURE: 139 MMHG | DIASTOLIC BLOOD PRESSURE: 81 MMHG | HEART RATE: 64 BPM | TEMPERATURE: 97.9 F | RESPIRATION RATE: 16 BRPM

## 2020-01-01 VITALS
TEMPERATURE: 98.4 F | HEART RATE: 60 BPM | RESPIRATION RATE: 18 BRPM | WEIGHT: 172.84 LBS | SYSTOLIC BLOOD PRESSURE: 159 MMHG | OXYGEN SATURATION: 95 % | DIASTOLIC BLOOD PRESSURE: 48 MMHG | HEIGHT: 65 IN | BODY MASS INDEX: 28.8 KG/M2

## 2020-01-01 VITALS
DIASTOLIC BLOOD PRESSURE: 65 MMHG | WEIGHT: 160 LBS | HEART RATE: 56 BPM | RESPIRATION RATE: 18 BRPM | OXYGEN SATURATION: 98 % | HEIGHT: 64 IN | TEMPERATURE: 98.1 F | SYSTOLIC BLOOD PRESSURE: 203 MMHG | BODY MASS INDEX: 27.31 KG/M2

## 2020-01-01 VITALS
HEIGHT: 64 IN | BODY MASS INDEX: 27.31 KG/M2 | OXYGEN SATURATION: 92 % | TEMPERATURE: 98.3 F | DIASTOLIC BLOOD PRESSURE: 55 MMHG | SYSTOLIC BLOOD PRESSURE: 152 MMHG | RESPIRATION RATE: 18 BRPM | HEART RATE: 60 BPM | WEIGHT: 160 LBS

## 2020-01-01 VITALS
HEART RATE: 49 BPM | DIASTOLIC BLOOD PRESSURE: 50 MMHG | OXYGEN SATURATION: 99 % | RESPIRATION RATE: 18 BRPM | SYSTOLIC BLOOD PRESSURE: 164 MMHG | TEMPERATURE: 98.2 F

## 2020-01-01 VITALS
OXYGEN SATURATION: 99 % | HEART RATE: 55 BPM | DIASTOLIC BLOOD PRESSURE: 58 MMHG | RESPIRATION RATE: 18 BRPM | TEMPERATURE: 98.6 F | SYSTOLIC BLOOD PRESSURE: 145 MMHG

## 2020-01-01 VITALS
SYSTOLIC BLOOD PRESSURE: 144 MMHG | OXYGEN SATURATION: 100 % | DIASTOLIC BLOOD PRESSURE: 52 MMHG | TEMPERATURE: 97.8 F | HEART RATE: 56 BPM | RESPIRATION RATE: 18 BRPM

## 2020-01-01 VITALS
DIASTOLIC BLOOD PRESSURE: 80 MMHG | OXYGEN SATURATION: 100 % | RESPIRATION RATE: 18 BRPM | TEMPERATURE: 97.5 F | SYSTOLIC BLOOD PRESSURE: 245 MMHG | HEART RATE: 59 BPM

## 2020-01-01 VITALS
DIASTOLIC BLOOD PRESSURE: 60 MMHG | TEMPERATURE: 97.5 F | SYSTOLIC BLOOD PRESSURE: 172 MMHG | RESPIRATION RATE: 18 BRPM | OXYGEN SATURATION: 99 % | HEART RATE: 55 BPM

## 2020-01-01 DIAGNOSIS — N18.4 CHRONIC KIDNEY DISEASE, STAGE IV (SEVERE) (HCC): ICD-10-CM

## 2020-01-01 DIAGNOSIS — I10 PRIMARY HYPERTENSION: ICD-10-CM

## 2020-01-01 DIAGNOSIS — N18.4 CHRONIC KIDNEY DISEASE, STAGE IV (SEVERE) (HCC): Primary | ICD-10-CM

## 2020-01-01 DIAGNOSIS — D50.9 IRON DEFICIENCY ANEMIA, UNSPECIFIED IRON DEFICIENCY ANEMIA TYPE: Primary | ICD-10-CM

## 2020-01-01 DIAGNOSIS — G30.1 DEMENTIA OF THE ALZHEIMER'S TYPE WITH LATE ONSET WITHOUT BEHAVIORAL DISTURBANCE (HCC): ICD-10-CM

## 2020-01-01 DIAGNOSIS — R00.1 BRADYCARDIA: Primary | ICD-10-CM

## 2020-01-01 DIAGNOSIS — F02.80 DEMENTIA OF THE ALZHEIMER'S TYPE WITH LATE ONSET WITHOUT BEHAVIORAL DISTURBANCE (HCC): ICD-10-CM

## 2020-01-01 DIAGNOSIS — N18.4 STAGE 4 CHRONIC KIDNEY DISEASE (HCC): Primary | ICD-10-CM

## 2020-01-01 DIAGNOSIS — I50.30 DIASTOLIC CONGESTIVE HEART FAILURE, UNSPECIFIED HF CHRONICITY (HCC): Primary | ICD-10-CM

## 2020-01-01 DIAGNOSIS — R25.1 TREMORS OF NERVOUS SYSTEM: Primary | ICD-10-CM

## 2020-01-01 DIAGNOSIS — J18.9 PNEUMONIA OF LEFT LUNG DUE TO INFECTIOUS ORGANISM, UNSPECIFIED PART OF LUNG: Primary | ICD-10-CM

## 2020-01-01 DIAGNOSIS — I10 ESSENTIAL HYPERTENSION: ICD-10-CM

## 2020-01-01 DIAGNOSIS — I48.91 ATRIAL FIBRILLATION, UNSPECIFIED TYPE (HCC): ICD-10-CM

## 2020-01-01 DIAGNOSIS — I48.0 PAROXYSMAL ATRIAL FIBRILLATION (HCC): ICD-10-CM

## 2020-01-01 DIAGNOSIS — Z86.59 HISTORY OF DEMENTIA: ICD-10-CM

## 2020-01-01 DIAGNOSIS — U07.1 COVID-19 VIRUS INFECTION: ICD-10-CM

## 2020-01-01 DIAGNOSIS — U07.1 COVID-19: ICD-10-CM

## 2020-01-01 DIAGNOSIS — C50.411 MALIGNANT NEOPLASM OF UPPER-OUTER QUADRANT OF RIGHT BREAST IN FEMALE, ESTROGEN RECEPTOR POSITIVE (HCC): Primary | ICD-10-CM

## 2020-01-01 DIAGNOSIS — M48.062 SPINAL STENOSIS, LUMBAR REGION, WITH NEUROGENIC CLAUDICATION: ICD-10-CM

## 2020-01-01 DIAGNOSIS — Z17.0 MALIGNANT NEOPLASM OF UPPER-OUTER QUADRANT OF RIGHT BREAST IN FEMALE, ESTROGEN RECEPTOR POSITIVE (HCC): Primary | ICD-10-CM

## 2020-01-01 DIAGNOSIS — U07.1 COVID-19 VIRUS INFECTION: Primary | ICD-10-CM

## 2020-01-01 DIAGNOSIS — I10 PRIMARY HYPERTENSION: Primary | ICD-10-CM

## 2020-01-01 DIAGNOSIS — G93.40 ENCEPHALOPATHY: ICD-10-CM

## 2020-01-01 DIAGNOSIS — D50.9 IRON DEFICIENCY ANEMIA, UNSPECIFIED IRON DEFICIENCY ANEMIA TYPE: ICD-10-CM

## 2020-01-01 DIAGNOSIS — N18.4 STAGE 4 CHRONIC KIDNEY DISEASE (HCC): ICD-10-CM

## 2020-01-01 DIAGNOSIS — N18.9 CHRONIC RENAL IMPAIRMENT, UNSPECIFIED CKD STAGE: ICD-10-CM

## 2020-01-01 DIAGNOSIS — R03.0 ELEVATED BLOOD PRESSURE READING: ICD-10-CM

## 2020-01-01 DIAGNOSIS — N18.30 STAGE 3 CHRONIC KIDNEY DISEASE, UNSPECIFIED WHETHER STAGE 3A OR 3B CKD (HCC): ICD-10-CM

## 2020-01-01 DIAGNOSIS — S22.32XA CLOSED FRACTURE OF ONE RIB OF LEFT SIDE, INITIAL ENCOUNTER: Primary | ICD-10-CM

## 2020-01-01 LAB
A-A DO2: 44.6 MMHG
ALBUMIN SERPL-MCNC: 2.7 G/DL (ref 3.5–5.2)
ALBUMIN SERPL-MCNC: 2.7 G/DL (ref 3.5–5.2)
ALBUMIN SERPL-MCNC: 2.8 G/DL (ref 3.5–5.2)
ALBUMIN SERPL-MCNC: 2.8 G/DL (ref 3.5–5.2)
ALBUMIN SERPL-MCNC: 3.2 G/DL (ref 3.5–5.2)
ALBUMIN SERPL-MCNC: 3.8 G/DL (ref 3.5–5.2)
ALBUMIN SERPL-MCNC: 3.9 G/DL (ref 3.5–5.2)
ALBUMIN SERPL-MCNC: 4 G/DL (ref 3.5–5.2)
ALBUMIN SERPL-MCNC: 4.1 G/DL (ref 3.5–5.2)
ALBUMIN SERPL-MCNC: 4.2 G/DL (ref 3.5–5.2)
ALBUMIN/GLOB SERPL: 1.2 G/DL
ALBUMIN/GLOB SERPL: 1.3 G/DL
ALBUMIN/GLOB SERPL: 1.6 G/DL
ALBUMIN/GLOB SERPL: 1.8 G/DL
ALP SERPL-CCNC: 61 U/L (ref 39–117)
ALP SERPL-CCNC: 83 U/L (ref 39–117)
ALP SERPL-CCNC: 88 U/L (ref 39–117)
ALP SERPL-CCNC: 98 U/L (ref 39–117)
ALT SERPL W P-5'-P-CCNC: 18 U/L (ref 1–33)
ALT SERPL W P-5'-P-CCNC: 23 U/L (ref 1–33)
ALT SERPL W P-5'-P-CCNC: 29 U/L (ref 1–33)
ALT SERPL W P-5'-P-CCNC: 29 U/L (ref 1–33)
AMORPH URATE CRY URNS QL MICRO: NORMAL /HPF
ANION GAP SERPL CALCULATED.3IONS-SCNC: 10.2 MMOL/L (ref 5–15)
ANION GAP SERPL CALCULATED.3IONS-SCNC: 10.4 MMOL/L (ref 5–15)
ANION GAP SERPL CALCULATED.3IONS-SCNC: 11.8 MMOL/L (ref 5–15)
ANION GAP SERPL CALCULATED.3IONS-SCNC: 12.1 MMOL/L (ref 5–15)
ANION GAP SERPL CALCULATED.3IONS-SCNC: 12.2 MMOL/L (ref 5–15)
ANION GAP SERPL CALCULATED.3IONS-SCNC: 12.2 MMOL/L (ref 5–15)
ANION GAP SERPL CALCULATED.3IONS-SCNC: 13.1 MMOL/L (ref 5–15)
ANION GAP SERPL CALCULATED.3IONS-SCNC: 13.3 MMOL/L (ref 5–15)
ANION GAP SERPL CALCULATED.3IONS-SCNC: 13.5 MMOL/L (ref 5–15)
ANION GAP SERPL CALCULATED.3IONS-SCNC: 14 MMOL/L (ref 5–15)
ANION GAP SERPL CALCULATED.3IONS-SCNC: 14.8 MMOL/L (ref 5–15)
ANION GAP SERPL CALCULATED.3IONS-SCNC: 8.7 MMOL/L (ref 5–15)
ANION GAP SERPL CALCULATED.3IONS-SCNC: 9.3 MMOL/L (ref 5–15)
ANION GAP SERPL CALCULATED.3IONS-SCNC: 9.3 MMOL/L (ref 5–15)
ANISOCYTOSIS BLD QL: NORMAL
ARTERIAL PATENCY WRIST A: POSITIVE
AST SERPL-CCNC: 26 U/L (ref 1–32)
AST SERPL-CCNC: 26 U/L (ref 1–32)
AST SERPL-CCNC: 29 U/L (ref 1–32)
AST SERPL-CCNC: 29 U/L (ref 1–32)
ATMOSPHERIC PRESS: 735 MMHG
BACTERIA SPEC AEROBE CULT: NORMAL
BACTERIA SPEC AEROBE CULT: NORMAL
BACTERIA UR QL AUTO: NORMAL /HPF
BASE EXCESS BLDA CALC-SCNC: -3.6 MMOL/L (ref 0–2)
BASOPHILS # BLD AUTO: 0.03 10*3/MM3 (ref 0–0.2)
BASOPHILS # BLD AUTO: 0.03 10*3/MM3 (ref 0–0.2)
BASOPHILS # BLD AUTO: 0.04 10*3/MM3 (ref 0–0.2)
BASOPHILS # BLD AUTO: 0.04 10*3/MM3 (ref 0–0.2)
BASOPHILS # BLD AUTO: 0.07 10*3/MM3 (ref 0–0.2)
BASOPHILS NFR BLD AUTO: 0.4 % (ref 0–1.5)
BASOPHILS NFR BLD AUTO: 0.4 % (ref 0–1.5)
BASOPHILS NFR BLD AUTO: 0.6 % (ref 0–1.5)
BDY SITE: ABNORMAL
BILIRUB SERPL-MCNC: 0.3 MG/DL (ref 0.2–1.2)
BILIRUB SERPL-MCNC: 0.5 MG/DL (ref 0–1.2)
BILIRUB SERPL-MCNC: 0.5 MG/DL (ref 0–1.2)
BILIRUB SERPL-MCNC: 0.6 MG/DL (ref 0–1.2)
BILIRUB UR QL STRIP: NEGATIVE
BILIRUB UR QL STRIP: NEGATIVE
BUN BLD-MCNC: 33 MG/DL (ref 8–23)
BUN BLD-MCNC: 39 MG/DL (ref 8–23)
BUN BLD-MCNC: 39 MG/DL (ref 8–23)
BUN SERPL-MCNC: 30 MG/DL (ref 8–23)
BUN SERPL-MCNC: 33 MG/DL (ref 8–23)
BUN SERPL-MCNC: 35 MG/DL (ref 8–23)
BUN SERPL-MCNC: 37 MG/DL (ref 8–23)
BUN SERPL-MCNC: 40 MG/DL (ref 8–23)
BUN SERPL-MCNC: 44 MG/DL (ref 8–23)
BUN SERPL-MCNC: 46 MG/DL (ref 8–23)
BUN SERPL-MCNC: 50 MG/DL (ref 8–23)
BUN SERPL-MCNC: 54 MG/DL (ref 8–23)
BUN SERPL-MCNC: 55 MG/DL (ref 8–23)
BUN SERPL-MCNC: 69 MG/DL (ref 8–23)
BUN/CREAT SERPL: 13.9 (ref 7–25)
BUN/CREAT SERPL: 13.9 (ref 7–25)
BUN/CREAT SERPL: 14.4 (ref 7–25)
BUN/CREAT SERPL: 14.7 (ref 7–25)
BUN/CREAT SERPL: 16.5 (ref 7–25)
BUN/CREAT SERPL: 16.7 (ref 7–25)
BUN/CREAT SERPL: 16.8 (ref 7–25)
BUN/CREAT SERPL: 16.9 (ref 7–25)
BUN/CREAT SERPL: 17 (ref 7–25)
BUN/CREAT SERPL: 17.7 (ref 7–25)
BUN/CREAT SERPL: 18 (ref 7–25)
BUN/CREAT SERPL: 19.4 (ref 7–25)
BUN/CREAT SERPL: 19.5 (ref 7–25)
BUN/CREAT SERPL: 25.6 (ref 7–25)
CALCIUM SPEC-SCNC: 10 MG/DL (ref 8.6–10.5)
CALCIUM SPEC-SCNC: 10.1 MG/DL (ref 8.6–10.5)
CALCIUM SPEC-SCNC: 10.2 MG/DL (ref 8.6–10.5)
CALCIUM SPEC-SCNC: 10.3 MG/DL (ref 8.6–10.5)
CALCIUM SPEC-SCNC: 10.3 MG/DL (ref 8.6–10.5)
CALCIUM SPEC-SCNC: 10.4 MG/DL (ref 8.6–10.5)
CALCIUM SPEC-SCNC: 10.4 MG/DL (ref 8.6–10.5)
CALCIUM SPEC-SCNC: 8.3 MG/DL (ref 8.6–10.5)
CALCIUM SPEC-SCNC: 9.1 MG/DL (ref 8.6–10.5)
CALCIUM SPEC-SCNC: 9.1 MG/DL (ref 8.6–10.5)
CALCIUM SPEC-SCNC: 9.4 MG/DL (ref 8.6–10.5)
CALCIUM SPEC-SCNC: 9.9 MG/DL (ref 8.6–10.5)
CHLORIDE SERPL-SCNC: 100 MMOL/L (ref 98–107)
CHLORIDE SERPL-SCNC: 100 MMOL/L (ref 98–107)
CHLORIDE SERPL-SCNC: 101 MMOL/L (ref 98–107)
CHLORIDE SERPL-SCNC: 101 MMOL/L (ref 98–107)
CHLORIDE SERPL-SCNC: 105 MMOL/L (ref 98–107)
CHLORIDE SERPL-SCNC: 108 MMOL/L (ref 98–107)
CHLORIDE SERPL-SCNC: 108 MMOL/L (ref 98–107)
CHLORIDE SERPL-SCNC: 111 MMOL/L (ref 98–107)
CHLORIDE SERPL-SCNC: 112 MMOL/L (ref 98–107)
CHLORIDE SERPL-SCNC: 112 MMOL/L (ref 98–107)
CHLORIDE SERPL-SCNC: 95 MMOL/L (ref 98–107)
CHLORIDE SERPL-SCNC: 99 MMOL/L (ref 98–107)
CK SERPL-CCNC: 104 U/L (ref 20–180)
CLARITY UR: ABNORMAL
CLARITY UR: ABNORMAL
CO2 SERPL-SCNC: 16.6 MMOL/L (ref 22–29)
CO2 SERPL-SCNC: 16.9 MMOL/L (ref 22–29)
CO2 SERPL-SCNC: 18.8 MMOL/L (ref 22–29)
CO2 SERPL-SCNC: 18.8 MMOL/L (ref 22–29)
CO2 SERPL-SCNC: 19.9 MMOL/L (ref 22–29)
CO2 SERPL-SCNC: 20.3 MMOL/L (ref 22–29)
CO2 SERPL-SCNC: 20.7 MMOL/L (ref 22–29)
CO2 SERPL-SCNC: 22.2 MMOL/L (ref 22–29)
CO2 SERPL-SCNC: 24.7 MMOL/L (ref 22–29)
CO2 SERPL-SCNC: 25.7 MMOL/L (ref 22–29)
CO2 SERPL-SCNC: 26.5 MMOL/L (ref 22–29)
CO2 SERPL-SCNC: 26.8 MMOL/L (ref 22–29)
CO2 SERPL-SCNC: 27.2 MMOL/L (ref 22–29)
CO2 SERPL-SCNC: 28 MMOL/L (ref 22–29)
COHGB MFR BLD: 1 % (ref 0–2)
COLOR UR: YELLOW
COLOR UR: YELLOW
CREAT BLD-MCNC: 2.25 MG/DL (ref 0.57–1)
CREAT BLD-MCNC: 2.81 MG/DL (ref 0.57–1)
CREAT BLD-MCNC: 2.81 MG/DL (ref 0.57–1)
CREAT SERPL-MCNC: 1.56 MG/DL (ref 0.57–1)
CREAT SERPL-MCNC: 1.76 MG/DL (ref 0.57–1)
CREAT SERPL-MCNC: 1.94 MG/DL (ref 0.57–1)
CREAT SERPL-MCNC: 1.97 MG/DL (ref 0.57–1)
CREAT SERPL-MCNC: 2.21 MG/DL (ref 0.57–1)
CREAT SERPL-MCNC: 2.56 MG/DL (ref 0.57–1)
CREAT SERPL-MCNC: 2.78 MG/DL (ref 0.57–1)
CREAT SERPL-MCNC: 3.06 MG/DL (ref 0.57–1)
CREAT SERPL-MCNC: 3.11 MG/DL (ref 0.57–1)
CREAT SERPL-MCNC: 3.19 MG/DL (ref 0.57–1)
CREAT SERPL-MCNC: 3.55 MG/DL (ref 0.57–1)
DEPRECATED RDW RBC AUTO: 49.9 FL (ref 37–54)
DEPRECATED RDW RBC AUTO: 50.3 FL (ref 37–54)
DEPRECATED RDW RBC AUTO: 50.7 FL (ref 37–54)
DEPRECATED RDW RBC AUTO: 51.1 FL (ref 37–54)
DEPRECATED RDW RBC AUTO: 52 FL (ref 37–54)
DEPRECATED RDW RBC AUTO: 52.4 FL (ref 37–54)
DEPRECATED RDW RBC AUTO: 52.6 FL (ref 37–54)
DEPRECATED RDW RBC AUTO: 53.1 FL (ref 37–54)
DEPRECATED RDW RBC AUTO: 53.1 FL (ref 37–54)
DEPRECATED RDW RBC AUTO: 55.7 FL (ref 37–54)
DEPRECATED RDW RBC AUTO: 56.6 FL (ref 37–54)
DEPRECATED RDW RBC AUTO: 56.6 FL (ref 37–54)
DEPRECATED RDW RBC AUTO: 56.8 FL (ref 37–54)
DEPRECATED RDW RBC AUTO: 57.2 FL (ref 37–54)
EOSINOPHIL # BLD AUTO: 0.02 10*3/MM3 (ref 0–0.4)
EOSINOPHIL # BLD AUTO: 0.02 10*3/MM3 (ref 0–0.4)
EOSINOPHIL # BLD AUTO: 0.03 10*3/MM3 (ref 0–0.4)
EOSINOPHIL # BLD AUTO: 0.07 10*3/MM3 (ref 0–0.4)
EOSINOPHIL # BLD AUTO: 0.12 10*3/MM3 (ref 0–0.4)
EOSINOPHIL NFR BLD AUTO: 0.2 % (ref 0.3–6.2)
EOSINOPHIL NFR BLD AUTO: 0.3 % (ref 0.3–6.2)
EOSINOPHIL NFR BLD AUTO: 0.5 % (ref 0.3–6.2)
EOSINOPHIL NFR BLD AUTO: 1 % (ref 0.3–6.2)
EOSINOPHIL NFR BLD AUTO: 1.3 % (ref 0.3–6.2)
ERYTHROCYTE [DISTWIDTH] IN BLOOD BY AUTOMATED COUNT: 13.4 % (ref 12.3–15.4)
ERYTHROCYTE [DISTWIDTH] IN BLOOD BY AUTOMATED COUNT: 13.6 % (ref 12.3–15.4)
ERYTHROCYTE [DISTWIDTH] IN BLOOD BY AUTOMATED COUNT: 13.6 % (ref 12.3–15.4)
ERYTHROCYTE [DISTWIDTH] IN BLOOD BY AUTOMATED COUNT: 13.7 % (ref 12.3–15.4)
ERYTHROCYTE [DISTWIDTH] IN BLOOD BY AUTOMATED COUNT: 13.8 % (ref 12.3–15.4)
ERYTHROCYTE [DISTWIDTH] IN BLOOD BY AUTOMATED COUNT: 14 % (ref 12.3–15.4)
ERYTHROCYTE [DISTWIDTH] IN BLOOD BY AUTOMATED COUNT: 14 % (ref 12.3–15.4)
ERYTHROCYTE [DISTWIDTH] IN BLOOD BY AUTOMATED COUNT: 14.5 % (ref 12.3–15.4)
ERYTHROCYTE [DISTWIDTH] IN BLOOD BY AUTOMATED COUNT: 14.8 % (ref 12.3–15.4)
ERYTHROCYTE [DISTWIDTH] IN BLOOD BY AUTOMATED COUNT: 15.5 % (ref 12.3–15.4)
ERYTHROCYTE [DISTWIDTH] IN BLOOD BY AUTOMATED COUNT: 15.8 % (ref 12.3–15.4)
ERYTHROCYTE [DISTWIDTH] IN BLOOD BY AUTOMATED COUNT: 15.9 % (ref 12.3–15.4)
ERYTHROCYTE [DISTWIDTH] IN BLOOD BY AUTOMATED COUNT: 16 % (ref 12.3–15.4)
ERYTHROCYTE [DISTWIDTH] IN BLOOD BY AUTOMATED COUNT: 16.3 % (ref 12.3–15.4)
GFR SERPL CREATININE-BSD FRML MDRD: 13 ML/MIN/1.73
GFR SERPL CREATININE-BSD FRML MDRD: 14 ML/MIN/1.73
GFR SERPL CREATININE-BSD FRML MDRD: 15 ML/MIN/1.73
GFR SERPL CREATININE-BSD FRML MDRD: 15 ML/MIN/1.73
GFR SERPL CREATININE-BSD FRML MDRD: 16 ML/MIN/1.73
GFR SERPL CREATININE-BSD FRML MDRD: 16 ML/MIN/1.73
GFR SERPL CREATININE-BSD FRML MDRD: 17 ML/MIN/1.73
GFR SERPL CREATININE-BSD FRML MDRD: 18 ML/MIN/1.73
GFR SERPL CREATININE-BSD FRML MDRD: 21 ML/MIN/1.73
GFR SERPL CREATININE-BSD FRML MDRD: 22 ML/MIN/1.73
GFR SERPL CREATININE-BSD FRML MDRD: 25 ML/MIN/1.73
GFR SERPL CREATININE-BSD FRML MDRD: 25 ML/MIN/1.73
GFR SERPL CREATININE-BSD FRML MDRD: 28 ML/MIN/1.73
GFR SERPL CREATININE-BSD FRML MDRD: 32 ML/MIN/1.73
GFR SERPL CREATININE-BSD FRML MDRD: ABNORMAL ML/MIN/{1.73_M2}
GFR SERPL CREATININE-BSD FRML MDRD: ABNORMAL ML/MIN/{1.73_M2}
GLOBULIN UR ELPH-MCNC: 2.3 GM/DL
GLOBULIN UR ELPH-MCNC: 2.4 GM/DL
GLOBULIN UR ELPH-MCNC: 2.4 GM/DL
GLOBULIN UR ELPH-MCNC: 2.6 GM/DL
GLUCOSE BLD-MCNC: 108 MG/DL (ref 65–99)
GLUCOSE BLD-MCNC: 111 MG/DL (ref 65–99)
GLUCOSE BLD-MCNC: 112 MG/DL (ref 65–99)
GLUCOSE SERPL-MCNC: 111 MG/DL (ref 65–99)
GLUCOSE SERPL-MCNC: 121 MG/DL (ref 65–99)
GLUCOSE SERPL-MCNC: 125 MG/DL (ref 65–99)
GLUCOSE SERPL-MCNC: 138 MG/DL (ref 65–99)
GLUCOSE SERPL-MCNC: 91 MG/DL (ref 65–99)
GLUCOSE SERPL-MCNC: 93 MG/DL (ref 65–99)
GLUCOSE SERPL-MCNC: 93 MG/DL (ref 65–99)
GLUCOSE SERPL-MCNC: 96 MG/DL (ref 65–99)
GLUCOSE SERPL-MCNC: 96 MG/DL (ref 65–99)
GLUCOSE SERPL-MCNC: 97 MG/DL (ref 65–99)
GLUCOSE SERPL-MCNC: 97 MG/DL (ref 65–99)
GLUCOSE UR STRIP-MCNC: NEGATIVE MG/DL
GLUCOSE UR STRIP-MCNC: NEGATIVE MG/DL
HCO3 BLDA-SCNC: 20.4 MMOL/L (ref 22–28)
HCT VFR BLD AUTO: 30 % (ref 34–46.6)
HCT VFR BLD AUTO: 30.3 % (ref 34–46.6)
HCT VFR BLD AUTO: 31 % (ref 34–46.6)
HCT VFR BLD AUTO: 31.5 % (ref 34–46.6)
HCT VFR BLD AUTO: 32 % (ref 34–46.6)
HCT VFR BLD AUTO: 32.8 % (ref 34–46.6)
HCT VFR BLD AUTO: 33.3 % (ref 34–46.6)
HCT VFR BLD AUTO: 33.9 % (ref 34–46.6)
HCT VFR BLD AUTO: 34.4 % (ref 34–46.6)
HCT VFR BLD AUTO: 35.4 % (ref 34–46.6)
HCT VFR BLD AUTO: 35.4 % (ref 34–46.6)
HCT VFR BLD AUTO: 36.4 % (ref 34–46.6)
HCT VFR BLD AUTO: 36.8 % (ref 34–46.6)
HCT VFR BLD AUTO: 39.1 % (ref 34–46.6)
HCT VFR BLD CALC: 37.3 %
HGB BLD-MCNC: 10.1 G/DL (ref 12–15.9)
HGB BLD-MCNC: 10.3 G/DL (ref 12–15.9)
HGB BLD-MCNC: 10.5 G/DL (ref 12–15.9)
HGB BLD-MCNC: 10.6 G/DL (ref 12–15.9)
HGB BLD-MCNC: 10.7 G/DL (ref 12–15.9)
HGB BLD-MCNC: 10.7 G/DL (ref 12–15.9)
HGB BLD-MCNC: 10.9 G/DL (ref 12–15.9)
HGB BLD-MCNC: 11.1 G/DL (ref 12–15.9)
HGB BLD-MCNC: 11.4 G/DL (ref 12–15.9)
HGB BLD-MCNC: 11.4 G/DL (ref 12–15.9)
HGB BLD-MCNC: 11.6 G/DL (ref 12–15.9)
HGB BLD-MCNC: 11.6 G/DL (ref 12–15.9)
HGB BLD-MCNC: 12.3 G/DL (ref 12–15.9)
HGB BLD-MCNC: 9.7 G/DL (ref 12–15.9)
HGB UR QL STRIP.AUTO: NEGATIVE
HGB UR QL STRIP.AUTO: NEGATIVE
HOLD SPECIMEN: NORMAL
HOLD SPECIMEN: NORMAL
HYALINE CASTS UR QL AUTO: NORMAL /LPF
IMM GRANULOCYTES # BLD AUTO: 0.02 10*3/MM3 (ref 0–0.05)
IMM GRANULOCYTES # BLD AUTO: 0.04 10*3/MM3 (ref 0–0.05)
IMM GRANULOCYTES # BLD AUTO: 0.05 10*3/MM3 (ref 0–0.05)
IMM GRANULOCYTES # BLD AUTO: 0.05 10*3/MM3 (ref 0–0.05)
IMM GRANULOCYTES # BLD AUTO: 0.16 10*3/MM3 (ref 0–0.05)
IMM GRANULOCYTES NFR BLD AUTO: 0.3 % (ref 0–0.5)
IMM GRANULOCYTES NFR BLD AUTO: 0.5 % (ref 0–0.5)
IMM GRANULOCYTES NFR BLD AUTO: 0.6 % (ref 0–0.5)
IMM GRANULOCYTES NFR BLD AUTO: 0.9 % (ref 0–0.5)
IMM GRANULOCYTES NFR BLD AUTO: 1.4 % (ref 0–0.5)
INHALED O2 CONCENTRATION: 21 %
IRON 24H UR-MRATE: 20 MCG/DL (ref 37–145)
IRON 24H UR-MRATE: 26 MCG/DL (ref 37–145)
IRON 24H UR-MRATE: 48 MCG/DL (ref 37–145)
IRON 24H UR-MRATE: 71 MCG/DL (ref 37–145)
IRON 24H UR-MRATE: 77 MCG/DL (ref 37–145)
IRON SATN MFR SERPL: 12 % (ref 20–50)
IRON SATN MFR SERPL: 15 % (ref 20–50)
IRON SATN MFR SERPL: 9 % (ref 20–50)
KETONES UR QL STRIP: ABNORMAL
KETONES UR QL STRIP: NEGATIVE
LEUKOCYTE ESTERASE UR QL STRIP.AUTO: NEGATIVE
LEUKOCYTE ESTERASE UR QL STRIP.AUTO: NEGATIVE
LYMPHOCYTES # BLD AUTO: 0.24 10*3/MM3 (ref 0.7–3.1)
LYMPHOCYTES # BLD AUTO: 0.75 10*3/MM3 (ref 0.7–3.1)
LYMPHOCYTES # BLD AUTO: 0.8 10*3/MM3 (ref 0.7–3.1)
LYMPHOCYTES # BLD AUTO: 0.95 10*3/MM3 (ref 0.7–3.1)
LYMPHOCYTES # BLD AUTO: 1.24 10*3/MM3 (ref 0.7–3.1)
LYMPHOCYTES NFR BLD AUTO: 10 % (ref 19.6–45.3)
LYMPHOCYTES NFR BLD AUTO: 11.8 % (ref 19.6–45.3)
LYMPHOCYTES NFR BLD AUTO: 22.8 % (ref 19.6–45.3)
LYMPHOCYTES NFR BLD AUTO: 3.5 % (ref 19.6–45.3)
LYMPHOCYTES NFR BLD AUTO: 7 % (ref 19.6–45.3)
MACROCYTES BLD QL SMEAR: NORMAL
MACROCYTES BLD QL SMEAR: NORMAL
MAGNESIUM SERPL-MCNC: 2.2 MG/DL (ref 1.6–2.4)
MAGNESIUM SERPL-MCNC: 2.3 MG/DL (ref 1.6–2.4)
MAGNESIUM SERPL-MCNC: 2.6 MG/DL (ref 1.6–2.4)
MAGNESIUM SERPL-MCNC: 2.8 MG/DL (ref 1.6–2.4)
MCH RBC QN AUTO: 30.3 PG (ref 26.6–33)
MCH RBC QN AUTO: 30.6 PG (ref 26.6–33)
MCH RBC QN AUTO: 30.8 PG (ref 26.6–33)
MCH RBC QN AUTO: 31.1 PG (ref 26.6–33)
MCH RBC QN AUTO: 31.4 PG (ref 26.6–33)
MCH RBC QN AUTO: 32 PG (ref 26.6–33)
MCH RBC QN AUTO: 32 PG (ref 26.6–33)
MCH RBC QN AUTO: 32.6 PG (ref 26.6–33)
MCH RBC QN AUTO: 32.8 PG (ref 26.6–33)
MCH RBC QN AUTO: 32.8 PG (ref 26.6–33)
MCH RBC QN AUTO: 33 PG (ref 26.6–33)
MCH RBC QN AUTO: 33 PG (ref 26.6–33)
MCH RBC QN AUTO: 33.4 PG (ref 26.6–33)
MCH RBC QN AUTO: 33.7 PG (ref 26.6–33)
MCHC RBC AUTO-ENTMCNC: 31.1 G/DL (ref 31.5–35.7)
MCHC RBC AUTO-ENTMCNC: 31.3 G/DL (ref 31.5–35.7)
MCHC RBC AUTO-ENTMCNC: 31.5 G/DL (ref 31.5–35.7)
MCHC RBC AUTO-ENTMCNC: 31.5 G/DL (ref 31.5–35.7)
MCHC RBC AUTO-ENTMCNC: 32 G/DL (ref 31.5–35.7)
MCHC RBC AUTO-ENTMCNC: 32.2 G/DL (ref 31.5–35.7)
MCHC RBC AUTO-ENTMCNC: 32.6 G/DL (ref 31.5–35.7)
MCHC RBC AUTO-ENTMCNC: 32.7 G/DL (ref 31.5–35.7)
MCHC RBC AUTO-ENTMCNC: 32.7 G/DL (ref 31.5–35.7)
MCHC RBC AUTO-ENTMCNC: 32.8 G/DL (ref 31.5–35.7)
MCHC RBC AUTO-ENTMCNC: 32.8 G/DL (ref 31.5–35.7)
MCHC RBC AUTO-ENTMCNC: 33.2 G/DL (ref 31.5–35.7)
MCHC RBC AUTO-ENTMCNC: 33.7 G/DL (ref 31.5–35.7)
MCHC RBC AUTO-ENTMCNC: 33.7 G/DL (ref 31.5–35.7)
MCV RBC AUTO: 100 FL (ref 79–97)
MCV RBC AUTO: 100.3 FL (ref 79–97)
MCV RBC AUTO: 101.7 FL (ref 79–97)
MCV RBC AUTO: 102.5 FL (ref 79–97)
MCV RBC AUTO: 103.1 FL (ref 79–97)
MCV RBC AUTO: 104.3 FL (ref 79–97)
MCV RBC AUTO: 106.2 FL (ref 79–97)
MCV RBC AUTO: 92.8 FL (ref 79–97)
MCV RBC AUTO: 94.1 FL (ref 79–97)
MCV RBC AUTO: 94.9 FL (ref 79–97)
MCV RBC AUTO: 94.9 FL (ref 79–97)
MCV RBC AUTO: 95.8 FL (ref 79–97)
MCV RBC AUTO: 97.6 FL (ref 79–97)
MCV RBC AUTO: 99.4 FL (ref 79–97)
METHGB BLD QL: 1.1 % (ref 0–1.5)
MODALITY: ABNORMAL
MONOCYTES # BLD AUTO: 0.33 10*3/MM3 (ref 0.1–0.9)
MONOCYTES # BLD AUTO: 0.39 10*3/MM3 (ref 0.1–0.9)
MONOCYTES # BLD AUTO: 0.61 10*3/MM3 (ref 0.1–0.9)
MONOCYTES # BLD AUTO: 0.67 10*3/MM3 (ref 0.1–0.9)
MONOCYTES # BLD AUTO: 0.68 10*3/MM3 (ref 0.1–0.9)
MONOCYTES NFR BLD AUTO: 5.8 % (ref 5–12)
MONOCYTES NFR BLD AUTO: 6.1 % (ref 5–12)
MONOCYTES NFR BLD AUTO: 6.2 % (ref 5–12)
MONOCYTES NFR BLD AUTO: 6.4 % (ref 5–12)
MONOCYTES NFR BLD AUTO: 9.9 % (ref 5–12)
MRSA DNA SPEC QL NAA+PROBE: NORMAL
MUCOUS THREADS URNS QL MICRO: NORMAL /HPF
NEUTROPHILS # BLD AUTO: 3.73 10*3/MM3 (ref 1.7–7)
NEUTROPHILS NFR BLD AUTO: 5.09 10*3/MM3 (ref 1.7–7)
NEUTROPHILS NFR BLD AUTO: 5.87 10*3/MM3 (ref 1.7–7)
NEUTROPHILS NFR BLD AUTO: 68.3 % (ref 42.7–76)
NEUTROPHILS NFR BLD AUTO: 7.84 10*3/MM3 (ref 1.7–7)
NEUTROPHILS NFR BLD AUTO: 80.3 % (ref 42.7–76)
NEUTROPHILS NFR BLD AUTO: 82.5 % (ref 42.7–76)
NEUTROPHILS NFR BLD AUTO: 84.2 % (ref 42.7–76)
NEUTROPHILS NFR BLD AUTO: 85.6 % (ref 42.7–76)
NEUTROPHILS NFR BLD AUTO: 9.68 10*3/MM3 (ref 1.7–7)
NITRITE UR QL STRIP: NEGATIVE
NITRITE UR QL STRIP: NEGATIVE
NOTE: ABNORMAL
NRBC BLD AUTO-RTO: 0 /100 WBC (ref 0–0.2)
NT-PROBNP SERPL-MCNC: 2562 PG/ML (ref 0–1800)
OXYHGB MFR BLDV: 91.5 % (ref 94–99)
PCO2 BLDA: 32.8 MM HG (ref 35–45)
PCO2 TEMP ADJ BLD: ABNORMAL MM[HG]
PH BLDA: 7.4 PH UNITS (ref 7.3–7.5)
PH UR STRIP.AUTO: 5.5 [PH] (ref 5–8)
PH UR STRIP.AUTO: <=5 [PH] (ref 5–8)
PH, TEMP CORRECTED: ABNORMAL
PHOSPHATE SERPL-MCNC: 2.4 MG/DL (ref 2.5–4.5)
PHOSPHATE SERPL-MCNC: 2.8 MG/DL (ref 2.5–4.5)
PHOSPHATE SERPL-MCNC: 3.1 MG/DL (ref 2.5–4.5)
PHOSPHATE SERPL-MCNC: 3.1 MG/DL (ref 2.5–4.5)
PHOSPHATE SERPL-MCNC: 3.3 MG/DL (ref 2.5–4.5)
PHOSPHATE SERPL-MCNC: 3.8 MG/DL (ref 2.5–4.5)
PHOSPHATE SERPL-MCNC: 3.9 MG/DL (ref 2.5–4.5)
PHOSPHATE SERPL-MCNC: 4.7 MG/DL (ref 2.5–4.5)
PLATELET # BLD AUTO: 136 10*3/MM3 (ref 140–450)
PLATELET # BLD AUTO: 140 10*3/MM3 (ref 140–450)
PLATELET # BLD AUTO: 158 10*3/MM3 (ref 140–450)
PLATELET # BLD AUTO: 167 10*3/MM3 (ref 140–450)
PLATELET # BLD AUTO: 168 10*3/MM3 (ref 140–450)
PLATELET # BLD AUTO: 172 10*3/MM3 (ref 140–450)
PLATELET # BLD AUTO: 184 10*3/MM3 (ref 140–450)
PLATELET # BLD AUTO: 195 10*3/MM3 (ref 140–450)
PLATELET # BLD AUTO: 196 10*3/MM3 (ref 140–450)
PLATELET # BLD AUTO: 209 10*3/MM3 (ref 140–450)
PLATELET # BLD AUTO: 214 10*3/MM3 (ref 140–450)
PLATELET # BLD AUTO: 217 10*3/MM3 (ref 140–450)
PLATELET # BLD AUTO: 218 10*3/MM3 (ref 140–450)
PLATELET # BLD AUTO: 232 10*3/MM3 (ref 140–450)
PMV BLD AUTO: 10.3 FL (ref 6–12)
PMV BLD AUTO: 10.8 FL (ref 6–12)
PMV BLD AUTO: 10.8 FL (ref 6–12)
PMV BLD AUTO: 11 FL (ref 6–12)
PMV BLD AUTO: 11 FL (ref 6–12)
PMV BLD AUTO: 11.1 FL (ref 6–12)
PMV BLD AUTO: 11.1 FL (ref 6–12)
PMV BLD AUTO: 11.2 FL (ref 6–12)
PMV BLD AUTO: 11.4 FL (ref 6–12)
PMV BLD AUTO: 11.5 FL (ref 6–12)
PMV BLD AUTO: 11.6 FL (ref 6–12)
PMV BLD AUTO: 11.8 FL (ref 6–12)
PMV BLD AUTO: 12 FL (ref 6–12)
PMV BLD AUTO: 12 FL (ref 6–12)
PO2 BLDA: 62.7 MM HG (ref 75–100)
PO2 TEMP ADJ BLD: ABNORMAL MM[HG]
POIKILOCYTOSIS BLD QL SMEAR: NORMAL
POTASSIUM BLD-SCNC: 3.9 MMOL/L (ref 3.5–5.2)
POTASSIUM BLD-SCNC: 4.3 MMOL/L (ref 3.5–5.2)
POTASSIUM BLD-SCNC: 4.7 MMOL/L (ref 3.5–5.2)
POTASSIUM SERPL-SCNC: 4 MMOL/L (ref 3.5–5.2)
POTASSIUM SERPL-SCNC: 4.3 MMOL/L (ref 3.5–5.2)
POTASSIUM SERPL-SCNC: 4.3 MMOL/L (ref 3.5–5.2)
POTASSIUM SERPL-SCNC: 4.4 MMOL/L (ref 3.5–5.2)
POTASSIUM SERPL-SCNC: 4.5 MMOL/L (ref 3.5–5.2)
POTASSIUM SERPL-SCNC: 4.6 MMOL/L (ref 3.5–5.2)
POTASSIUM SERPL-SCNC: 4.7 MMOL/L (ref 3.5–5.2)
PROT SERPL-MCNC: 5.2 G/DL (ref 6–8.5)
PROT SERPL-MCNC: 5.6 G/DL (ref 6–8.5)
PROT SERPL-MCNC: 6.5 G/DL (ref 6–8.5)
PROT SERPL-MCNC: 6.8 G/DL (ref 6–8.5)
PROT UR QL STRIP: ABNORMAL
PROT UR QL STRIP: NEGATIVE
PTH-INTACT SERPL-MCNC: 120 PG/ML (ref 15–65)
PTH-INTACT SERPL-MCNC: 56 PG/ML (ref 15–65)
PTH-INTACT SERPL-MCNC: 75.8 PG/ML (ref 15–65)
PTH-INTACT SERPL-MCNC: 96.8 PG/ML (ref 15–65)
RBC # BLD AUTO: 2.94 10*6/MM3 (ref 3.77–5.28)
RBC # BLD AUTO: 3.15 10*6/MM3 (ref 3.77–5.28)
RBC # BLD AUTO: 3.16 10*6/MM3 (ref 3.77–5.28)
RBC # BLD AUTO: 3.2 10*6/MM3 (ref 3.77–5.28)
RBC # BLD AUTO: 3.24 10*6/MM3 (ref 3.77–5.28)
RBC # BLD AUTO: 3.34 10*6/MM3 (ref 3.77–5.28)
RBC # BLD AUTO: 3.34 10*6/MM3 (ref 3.77–5.28)
RBC # BLD AUTO: 3.38 10*6/MM3 (ref 3.77–5.28)
RBC # BLD AUTO: 3.51 10*6/MM3 (ref 3.77–5.28)
RBC # BLD AUTO: 3.56 10*6/MM3 (ref 3.77–5.28)
RBC # BLD AUTO: 3.62 10*6/MM3 (ref 3.77–5.28)
RBC # BLD AUTO: 3.73 10*6/MM3 (ref 3.77–5.28)
RBC # BLD AUTO: 3.75 10*6/MM3 (ref 3.77–5.28)
RBC # BLD AUTO: 3.76 10*6/MM3 (ref 3.77–5.28)
RBC # UR: NORMAL /HPF
REF LAB TEST METHOD: NORMAL
SAO2 % BLDCOA: 93.5 % (ref 94–100)
SARS-COV-2 RNA PNL SPEC NAA+PROBE: NOT DETECTED
SMALL PLATELETS BLD QL SMEAR: ADEQUATE
SMALL PLATELETS BLD QL SMEAR: ADEQUATE
SODIUM BLD-SCNC: 133 MMOL/L (ref 136–145)
SODIUM BLD-SCNC: 139 MMOL/L (ref 136–145)
SODIUM BLD-SCNC: 143 MMOL/L (ref 136–145)
SODIUM SERPL-SCNC: 134 MMOL/L (ref 136–145)
SODIUM SERPL-SCNC: 137 MMOL/L (ref 136–145)
SODIUM SERPL-SCNC: 139 MMOL/L (ref 136–145)
SODIUM SERPL-SCNC: 139 MMOL/L (ref 136–145)
SODIUM SERPL-SCNC: 140 MMOL/L (ref 136–145)
SODIUM SERPL-SCNC: 141 MMOL/L (ref 136–145)
SODIUM SERPL-SCNC: 141 MMOL/L (ref 136–145)
SODIUM SERPL-SCNC: 143 MMOL/L (ref 136–145)
SODIUM SERPL-SCNC: 143 MMOL/L (ref 136–145)
SP GR UR STRIP: 1.01 (ref 1–1.03)
SP GR UR STRIP: 1.02 (ref 1–1.03)
SQUAMOUS #/AREA URNS HPF: NORMAL /HPF
TIBC SERPL-MCNC: 164 MCG/DL (ref 298–536)
TIBC SERPL-MCNC: 282 MCG/DL (ref 298–536)
TIBC SERPL-MCNC: 316 MCG/DL (ref 298–536)
TRANSFERRIN SERPL-MCNC: 110 MG/DL (ref 200–360)
TRANSFERRIN SERPL-MCNC: 189 MG/DL (ref 200–360)
TRANSFERRIN SERPL-MCNC: 212 MG/DL (ref 200–360)
TROPONIN T SERPL-MCNC: 0.02 NG/ML (ref 0–0.03)
TROPONIN T SERPL-MCNC: 0.02 NG/ML (ref 0–0.03)
TROPONIN T SERPL-MCNC: <0.01 NG/ML (ref 0–0.03)
TSH SERPL DL<=0.05 MIU/L-ACNC: 1.09 UIU/ML (ref 0.27–4.2)
URATE SERPL-MCNC: 5.3 MG/DL (ref 2.4–5.7)
URATE SERPL-MCNC: 5.4 MG/DL (ref 2.4–5.7)
URATE SERPL-MCNC: 6.8 MG/DL (ref 2.4–5.7)
URATE SERPL-MCNC: 7.3 MG/DL (ref 2.4–5.7)
URATE SERPL-MCNC: 7.7 MG/DL (ref 2.4–5.7)
URATE SERPL-MCNC: 8.9 MG/DL (ref 2.4–5.7)
UROBILINOGEN UR QL STRIP: ABNORMAL
UROBILINOGEN UR QL STRIP: ABNORMAL
VANCOMYCIN SERPL-MCNC: 18.7 MCG/ML (ref 5–40)
VENTILATOR MODE: ABNORMAL
WBC # BLD AUTO: 10.25 10*3/MM3 (ref 3.4–10.8)
WBC # BLD AUTO: 11.5 10*3/MM3 (ref 3.4–10.8)
WBC # BLD AUTO: 5.3 10*3/MM3 (ref 3.4–10.8)
WBC # BLD AUTO: 5.56 10*3/MM3 (ref 3.4–10.8)
WBC # BLD AUTO: 5.79 10*3/MM3 (ref 3.4–10.8)
WBC # BLD AUTO: 6.34 10*3/MM3 (ref 3.4–10.8)
WBC # BLD AUTO: 6.86 10*3/MM3 (ref 3.4–10.8)
WBC # BLD AUTO: 7.55 10*3/MM3 (ref 3.4–10.8)
WBC # BLD AUTO: 8.39 10*3/MM3 (ref 3.4–10.8)
WBC # BLD AUTO: 9.51 10*3/MM3 (ref 3.4–10.8)
WBC # BLD AUTO: 9.65 10*3/MM3 (ref 3.4–10.8)
WBC MORPH BLD: NORMAL
WBC MORPH BLD: NORMAL
WBC NRBC COR # BLD: 4.7 10*3/MM3 (ref 3.4–10.8)
WBC NRBC COR # BLD: 5.45 10*3/MM3 (ref 3.4–10.8)
WBC NRBC COR # BLD: 6.35 10*3/MM3 (ref 3.4–10.8)
WBC UR QL AUTO: NORMAL /HPF
WHOLE BLOOD HOLD SPECIMEN: NORMAL
WHOLE BLOOD HOLD SPECIMEN: NORMAL

## 2020-01-01 PROCEDURE — 36415 COLL VENOUS BLD VENIPUNCTURE: CPT

## 2020-01-01 PROCEDURE — 85027 COMPLETE CBC AUTOMATED: CPT

## 2020-01-01 PROCEDURE — 96366 THER/PROPH/DIAG IV INF ADDON: CPT

## 2020-01-01 PROCEDURE — 85025 COMPLETE CBC W/AUTO DIFF WBC: CPT | Performed by: NURSE PRACTITIONER

## 2020-01-01 PROCEDURE — G0378 HOSPITAL OBSERVATION PER HR: HCPCS

## 2020-01-01 PROCEDURE — 80202 ASSAY OF VANCOMYCIN: CPT | Performed by: FAMILY MEDICINE

## 2020-01-01 PROCEDURE — 85027 COMPLETE CBC AUTOMATED: CPT | Performed by: INTERNAL MEDICINE

## 2020-01-01 PROCEDURE — 84466 ASSAY OF TRANSFERRIN: CPT

## 2020-01-01 PROCEDURE — 83540 ASSAY OF IRON: CPT

## 2020-01-01 PROCEDURE — 71045 X-RAY EXAM CHEST 1 VIEW: CPT

## 2020-01-01 PROCEDURE — 87635 SARS-COV-2 COVID-19 AMP PRB: CPT | Performed by: EMERGENCY MEDICINE

## 2020-01-01 PROCEDURE — 70450 CT HEAD/BRAIN W/O DYE: CPT

## 2020-01-01 PROCEDURE — 80069 RENAL FUNCTION PANEL: CPT | Performed by: INTERNAL MEDICINE

## 2020-01-01 PROCEDURE — 99308 SBSQ NF CARE LOW MDM 20: CPT | Performed by: INTERNAL MEDICINE

## 2020-01-01 PROCEDURE — 97116 GAIT TRAINING THERAPY: CPT

## 2020-01-01 PROCEDURE — 81001 URINALYSIS AUTO W/SCOPE: CPT | Performed by: EMERGENCY MEDICINE

## 2020-01-01 PROCEDURE — 84550 ASSAY OF BLOOD/URIC ACID: CPT

## 2020-01-01 PROCEDURE — 80053 COMPREHEN METABOLIC PANEL: CPT | Performed by: EMERGENCY MEDICINE

## 2020-01-01 PROCEDURE — 84484 ASSAY OF TROPONIN QUANT: CPT | Performed by: EMERGENCY MEDICINE

## 2020-01-01 PROCEDURE — 25010000002 VANCOMYCIN 5 G RECONSTITUTED SOLUTION 5,000 MG VIAL: Performed by: FAMILY MEDICINE

## 2020-01-01 PROCEDURE — 97165 OT EVAL LOW COMPLEX 30 MIN: CPT

## 2020-01-01 PROCEDURE — 25010000002 FERRIC CARBOXYMALTOSE 750 MG/15ML SOLUTION 15 ML VIAL: Performed by: NURSE PRACTITIONER

## 2020-01-01 PROCEDURE — 83735 ASSAY OF MAGNESIUM: CPT

## 2020-01-01 PROCEDURE — 80053 COMPREHEN METABOLIC PANEL: CPT

## 2020-01-01 PROCEDURE — 99284 EMERGENCY DEPT VISIT MOD MDM: CPT

## 2020-01-01 PROCEDURE — C9803 HOPD COVID-19 SPEC COLLECT: HCPCS

## 2020-01-01 PROCEDURE — 97530 THERAPEUTIC ACTIVITIES: CPT

## 2020-01-01 PROCEDURE — 85007 BL SMEAR W/DIFF WBC COUNT: CPT

## 2020-01-01 PROCEDURE — 85025 COMPLETE CBC W/AUTO DIFF WBC: CPT

## 2020-01-01 PROCEDURE — G0463 HOSPITAL OUTPT CLINIC VISIT: HCPCS

## 2020-01-01 PROCEDURE — 99217 PR OBSERVATION CARE DISCHARGE MANAGEMENT: CPT | Performed by: INTERNAL MEDICINE

## 2020-01-01 PROCEDURE — 99283 EMERGENCY DEPT VISIT LOW MDM: CPT

## 2020-01-01 PROCEDURE — 80069 RENAL FUNCTION PANEL: CPT | Performed by: PHYSICIAN ASSISTANT

## 2020-01-01 PROCEDURE — 80048 BASIC METABOLIC PNL TOTAL CA: CPT | Performed by: INTERNAL MEDICINE

## 2020-01-01 PROCEDURE — 99282 EMERGENCY DEPT VISIT SF MDM: CPT

## 2020-01-01 PROCEDURE — 93005 ELECTROCARDIOGRAM TRACING: CPT

## 2020-01-01 PROCEDURE — 82550 ASSAY OF CK (CPK): CPT | Performed by: NURSE PRACTITIONER

## 2020-01-01 PROCEDURE — 83970 ASSAY OF PARATHORMONE: CPT

## 2020-01-01 PROCEDURE — 83735 ASSAY OF MAGNESIUM: CPT | Performed by: EMERGENCY MEDICINE

## 2020-01-01 PROCEDURE — 25010000002 VANCOMYCIN 1 G RECONSTITUTED SOLUTION 1 EACH VIAL: Performed by: FAMILY MEDICINE

## 2020-01-01 PROCEDURE — 36600 WITHDRAWAL OF ARTERIAL BLOOD: CPT

## 2020-01-01 PROCEDURE — 84443 ASSAY THYROID STIM HORMONE: CPT | Performed by: EMERGENCY MEDICINE

## 2020-01-01 PROCEDURE — 99497 ADVNCD CARE PLAN 30 MIN: CPT | Performed by: PHYSICIAN ASSISTANT

## 2020-01-01 PROCEDURE — 73502 X-RAY EXAM HIP UNI 2-3 VIEWS: CPT

## 2020-01-01 PROCEDURE — 81003 URINALYSIS AUTO W/O SCOPE: CPT

## 2020-01-01 PROCEDURE — 96367 TX/PROPH/DG ADDL SEQ IV INF: CPT

## 2020-01-01 PROCEDURE — 80069 RENAL FUNCTION PANEL: CPT

## 2020-01-01 PROCEDURE — 85025 COMPLETE CBC W/AUTO DIFF WBC: CPT | Performed by: EMERGENCY MEDICINE

## 2020-01-01 PROCEDURE — 96361 HYDRATE IV INFUSION ADD-ON: CPT

## 2020-01-01 PROCEDURE — 99225 PR SBSQ OBSERVATION CARE/DAY 25 MINUTES: CPT | Performed by: INTERNAL MEDICINE

## 2020-01-01 PROCEDURE — 85027 COMPLETE CBC AUTOMATED: CPT | Performed by: PHYSICIAN ASSISTANT

## 2020-01-01 PROCEDURE — 99219 PR INITIAL OBSERVATION CARE/DAY 50 MINUTES: CPT | Performed by: FAMILY MEDICINE

## 2020-01-01 PROCEDURE — 84484 ASSAY OF TROPONIN QUANT: CPT

## 2020-01-01 PROCEDURE — 85007 BL SMEAR W/DIFF WBC COUNT: CPT | Performed by: FAMILY MEDICINE

## 2020-01-01 PROCEDURE — 71101 X-RAY EXAM UNILAT RIBS/CHEST: CPT

## 2020-01-01 PROCEDURE — 83540 ASSAY OF IRON: CPT | Performed by: PHYSICIAN ASSISTANT

## 2020-01-01 PROCEDURE — 99310 SBSQ NF CARE HIGH MDM 45: CPT | Performed by: PHYSICIAN ASSISTANT

## 2020-01-01 PROCEDURE — 82310 ASSAY OF CALCIUM: CPT

## 2020-01-01 PROCEDURE — P9612 CATHETERIZE FOR URINE SPEC: HCPCS

## 2020-01-01 PROCEDURE — 96365 THER/PROPH/DIAG IV INF INIT: CPT

## 2020-01-01 PROCEDURE — 85025 COMPLETE CBC W/AUTO DIFF WBC: CPT | Performed by: FAMILY MEDICINE

## 2020-01-01 PROCEDURE — 82805 BLOOD GASES W/O2 SATURATION: CPT

## 2020-01-01 PROCEDURE — 80053 COMPREHEN METABOLIC PANEL: CPT | Performed by: NURSE PRACTITIONER

## 2020-01-01 PROCEDURE — 82375 ASSAY CARBOXYHB QUANT: CPT

## 2020-01-01 PROCEDURE — 83880 ASSAY OF NATRIURETIC PEPTIDE: CPT | Performed by: NURSE PRACTITIONER

## 2020-01-01 PROCEDURE — 84466 ASSAY OF TRANSFERRIN: CPT | Performed by: INTERNAL MEDICINE

## 2020-01-01 PROCEDURE — 83970 ASSAY OF PARATHORMONE: CPT | Performed by: PHYSICIAN ASSISTANT

## 2020-01-01 PROCEDURE — 87040 BLOOD CULTURE FOR BACTERIA: CPT | Performed by: EMERGENCY MEDICINE

## 2020-01-01 PROCEDURE — 99226 PR SBSQ OBSERVATION CARE/DAY 35 MINUTES: CPT | Performed by: INTERNAL MEDICINE

## 2020-01-01 PROCEDURE — 84550 ASSAY OF BLOOD/URIC ACID: CPT | Performed by: PHYSICIAN ASSISTANT

## 2020-01-01 PROCEDURE — 87641 MR-STAPH DNA AMP PROBE: CPT | Performed by: FAMILY MEDICINE

## 2020-01-01 PROCEDURE — 96375 TX/PRO/DX INJ NEW DRUG ADDON: CPT

## 2020-01-01 PROCEDURE — 97161 PT EVAL LOW COMPLEX 20 MIN: CPT

## 2020-01-01 PROCEDURE — 99305 1ST NF CARE MODERATE MDM 35: CPT | Performed by: INTERNAL MEDICINE

## 2020-01-01 PROCEDURE — 84466 ASSAY OF TRANSFERRIN: CPT | Performed by: PHYSICIAN ASSISTANT

## 2020-01-01 PROCEDURE — 99441 PR PHYS/QHP TELEPHONE EVALUATION 5-10 MIN: CPT | Performed by: NURSE PRACTITIONER

## 2020-01-01 PROCEDURE — 96374 THER/PROPH/DIAG INJ IV PUSH: CPT

## 2020-01-01 PROCEDURE — 83540 ASSAY OF IRON: CPT | Performed by: INTERNAL MEDICINE

## 2020-01-01 PROCEDURE — 84484 ASSAY OF TROPONIN QUANT: CPT | Performed by: NURSE PRACTITIONER

## 2020-01-01 PROCEDURE — 83050 HGB METHEMOGLOBIN QUAN: CPT

## 2020-01-01 PROCEDURE — 80048 BASIC METABOLIC PNL TOTAL CA: CPT | Performed by: FAMILY MEDICINE

## 2020-01-01 PROCEDURE — 83735 ASSAY OF MAGNESIUM: CPT | Performed by: NURSE PRACTITIONER

## 2020-01-01 PROCEDURE — 25010000002 FUROSEMIDE PER 20 MG: Performed by: PHYSICIAN ASSISTANT

## 2020-01-01 RX ORDER — MEMANTINE HYDROCHLORIDE 10 MG/1
10 TABLET ORAL 2 TIMES DAILY
COMMUNITY
End: 2021-01-01 | Stop reason: HOSPADM

## 2020-01-01 RX ORDER — SODIUM CHLORIDE 0.9 % (FLUSH) 0.9 %
10 SYRINGE (ML) INJECTION AS NEEDED
Status: DISCONTINUED | OUTPATIENT
Start: 2020-01-01 | End: 2020-01-01 | Stop reason: HOSPADM

## 2020-01-01 RX ORDER — ONDANSETRON 2 MG/ML
4 INJECTION INTRAMUSCULAR; INTRAVENOUS EVERY 6 HOURS PRN
Status: DISCONTINUED | OUTPATIENT
Start: 2020-01-01 | End: 2020-01-01 | Stop reason: HOSPADM

## 2020-01-01 RX ORDER — DOXYCYCLINE 100 MG/1
100 CAPSULE ORAL ONCE
Status: COMPLETED | OUTPATIENT
Start: 2020-01-01 | End: 2020-01-01

## 2020-01-01 RX ORDER — DOXYCYCLINE 100 MG/1
100 CAPSULE ORAL 2 TIMES DAILY
Qty: 6 CAPSULE | Refills: 0
Start: 2020-01-01 | End: 2020-01-01

## 2020-01-01 RX ORDER — SODIUM CHLORIDE 9 MG/ML
250 INJECTION, SOLUTION INTRAVENOUS CONTINUOUS PRN
Status: CANCELLED | OUTPATIENT
Start: 2020-01-01

## 2020-01-01 RX ORDER — ISOSORBIDE MONONITRATE 30 MG/1
30 TABLET, EXTENDED RELEASE ORAL 2 TIMES DAILY
Status: DISCONTINUED | OUTPATIENT
Start: 2020-01-01 | End: 2020-01-01 | Stop reason: HOSPADM

## 2020-01-01 RX ORDER — GABAPENTIN 600 MG/1
600 TABLET ORAL 2 TIMES DAILY
Qty: 20 TABLET | Refills: 0 | Status: SHIPPED | OUTPATIENT
Start: 2020-01-01 | End: 2021-01-01 | Stop reason: HOSPADM

## 2020-01-01 RX ORDER — LISINOPRIL 20 MG/1
20 TABLET ORAL DAILY
COMMUNITY
End: 2020-01-01 | Stop reason: HOSPADM

## 2020-01-01 RX ORDER — LISINOPRIL 20 MG/1
20 TABLET ORAL ONCE
Status: COMPLETED | OUTPATIENT
Start: 2020-01-01 | End: 2020-01-01

## 2020-01-01 RX ORDER — ACETAMINOPHEN 325 MG/1
650 TABLET ORAL EVERY 4 HOURS PRN
Status: DISCONTINUED | OUTPATIENT
Start: 2020-01-01 | End: 2020-01-01 | Stop reason: HOSPADM

## 2020-01-01 RX ORDER — ACETAMINOPHEN 650 MG/1
650 SUPPOSITORY RECTAL EVERY 4 HOURS PRN
Status: DISCONTINUED | OUTPATIENT
Start: 2020-01-01 | End: 2020-01-01 | Stop reason: HOSPADM

## 2020-01-01 RX ORDER — AMLODIPINE BESYLATE 5 MG/1
5 TABLET ORAL ONCE
Status: COMPLETED | OUTPATIENT
Start: 2020-01-01 | End: 2020-01-01

## 2020-01-01 RX ORDER — ALPRAZOLAM 0.5 MG/1
0.5 TABLET ORAL EVERY 8 HOURS PRN
Status: DISPENSED | OUTPATIENT
Start: 2020-01-01 | End: 2020-01-01

## 2020-01-01 RX ORDER — SODIUM CHLORIDE 9 MG/ML
250 INJECTION, SOLUTION INTRAVENOUS CONTINUOUS PRN
Status: DISCONTINUED | OUTPATIENT
Start: 2020-01-01 | End: 2020-01-01 | Stop reason: HOSPADM

## 2020-01-01 RX ORDER — AMLODIPINE BESYLATE 5 MG/1
5 TABLET ORAL
Status: DISCONTINUED | OUTPATIENT
Start: 2020-01-01 | End: 2020-01-01 | Stop reason: HOSPADM

## 2020-01-01 RX ORDER — LISINOPRIL 20 MG/1
20 TABLET ORAL DAILY
Status: ON HOLD | COMMUNITY
End: 2020-01-01

## 2020-01-01 RX ORDER — SODIUM CHLORIDE 9 MG/ML
100 INJECTION, SOLUTION INTRAVENOUS CONTINUOUS
Status: DISCONTINUED | OUTPATIENT
Start: 2020-01-01 | End: 2020-01-01

## 2020-01-01 RX ORDER — FERROUS SULFATE 325(65) MG
325 TABLET ORAL
Status: ON HOLD | COMMUNITY
End: 2021-01-01

## 2020-01-01 RX ORDER — BISACODYL 10 MG
10 SUPPOSITORY, RECTAL RECTAL DAILY PRN
Status: DISCONTINUED | OUTPATIENT
Start: 2020-01-01 | End: 2020-01-01 | Stop reason: HOSPADM

## 2020-01-01 RX ORDER — FERROUS SULFATE TAB EC 324 MG (65 MG FE EQUIVALENT) 324 (65 FE) MG
324 TABLET DELAYED RESPONSE ORAL 2 TIMES DAILY WITH MEALS
Status: DISCONTINUED | OUTPATIENT
Start: 2020-01-01 | End: 2020-01-01 | Stop reason: HOSPADM

## 2020-01-01 RX ORDER — GABAPENTIN 300 MG/1
600 CAPSULE ORAL EVERY 12 HOURS SCHEDULED
Status: DISCONTINUED | OUTPATIENT
Start: 2020-01-01 | End: 2020-01-01 | Stop reason: HOSPADM

## 2020-01-01 RX ORDER — ACETAMINOPHEN 160 MG/5ML
650 SOLUTION ORAL EVERY 4 HOURS PRN
Status: DISCONTINUED | OUTPATIENT
Start: 2020-01-01 | End: 2020-01-01 | Stop reason: HOSPADM

## 2020-01-01 RX ORDER — FERROUS SULFATE TAB EC 324 MG (65 MG FE EQUIVALENT) 324 (65 FE) MG
324 TABLET DELAYED RESPONSE ORAL
Status: DISCONTINUED | OUTPATIENT
Start: 2020-01-01 | End: 2020-01-01 | Stop reason: HOSPADM

## 2020-01-01 RX ORDER — CHOLECALCIFEROL (VITAMIN D3) 125 MCG
5 CAPSULE ORAL NIGHTLY PRN
Status: DISCONTINUED | OUTPATIENT
Start: 2020-01-01 | End: 2020-01-01 | Stop reason: HOSPADM

## 2020-01-01 RX ORDER — AMIODARONE HYDROCHLORIDE 200 MG/1
200 TABLET ORAL
Status: DISCONTINUED | OUTPATIENT
Start: 2020-01-01 | End: 2020-01-01 | Stop reason: HOSPADM

## 2020-01-01 RX ORDER — DONEPEZIL HYDROCHLORIDE 5 MG/1
10 TABLET, FILM COATED ORAL NIGHTLY
Status: DISCONTINUED | OUTPATIENT
Start: 2020-01-01 | End: 2020-01-01 | Stop reason: HOSPADM

## 2020-01-01 RX ORDER — LOSARTAN POTASSIUM 50 MG/1
100 TABLET ORAL
Status: DISCONTINUED | OUTPATIENT
Start: 2020-01-01 | End: 2020-01-01

## 2020-01-01 RX ORDER — ONDANSETRON 4 MG/1
4 TABLET, FILM COATED ORAL EVERY 6 HOURS PRN
Status: DISCONTINUED | OUTPATIENT
Start: 2020-01-01 | End: 2020-01-01 | Stop reason: HOSPADM

## 2020-01-01 RX ORDER — MORPHINE SULFATE 100 MG/5ML
5 SOLUTION ORAL
Qty: 30 ML | Refills: 0 | Status: SHIPPED | OUTPATIENT
Start: 2020-01-01 | End: 2021-01-01 | Stop reason: HOSPADM

## 2020-01-01 RX ORDER — FUROSEMIDE 40 MG/1
40 TABLET ORAL ONCE
Status: DISCONTINUED | OUTPATIENT
Start: 2020-01-01 | End: 2020-01-01 | Stop reason: HOSPADM

## 2020-01-01 RX ORDER — BENZONATATE 100 MG/1
100 CAPSULE ORAL 3 TIMES DAILY PRN
Status: DISCONTINUED | OUTPATIENT
Start: 2020-01-01 | End: 2020-01-01 | Stop reason: HOSPADM

## 2020-01-01 RX ORDER — CLOPIDOGREL BISULFATE 75 MG/1
75 TABLET ORAL DAILY
Status: DISCONTINUED | OUTPATIENT
Start: 2020-01-01 | End: 2020-01-01 | Stop reason: HOSPADM

## 2020-01-01 RX ORDER — ATORVASTATIN CALCIUM 40 MG/1
40 TABLET, FILM COATED ORAL NIGHTLY
Status: DISCONTINUED | OUTPATIENT
Start: 2020-01-01 | End: 2020-01-01 | Stop reason: HOSPADM

## 2020-01-01 RX ORDER — IPRATROPIUM BROMIDE AND ALBUTEROL SULFATE 2.5; .5 MG/3ML; MG/3ML
3 SOLUTION RESPIRATORY (INHALATION) EVERY 4 HOURS PRN
Status: DISCONTINUED | OUTPATIENT
Start: 2020-01-01 | End: 2020-01-01 | Stop reason: HOSPADM

## 2020-01-01 RX ORDER — FUROSEMIDE 40 MG/1
40 TABLET ORAL ONCE
Status: COMPLETED | OUTPATIENT
Start: 2020-01-01 | End: 2020-01-01

## 2020-01-01 RX ORDER — FUROSEMIDE 10 MG/ML
40 INJECTION INTRAMUSCULAR; INTRAVENOUS ONCE
Status: COMPLETED | OUTPATIENT
Start: 2020-01-01 | End: 2020-01-01

## 2020-01-01 RX ORDER — AMLODIPINE BESYLATE 5 MG/1
5 TABLET ORAL
Start: 2020-01-01 | End: 2021-01-01 | Stop reason: HOSPADM

## 2020-01-01 RX ORDER — DOXYCYCLINE 100 MG/1
100 CAPSULE ORAL EVERY 12 HOURS SCHEDULED
Status: DISCONTINUED | OUTPATIENT
Start: 2020-01-01 | End: 2020-01-01 | Stop reason: HOSPADM

## 2020-01-01 RX ORDER — SODIUM CHLORIDE 0.9 % (FLUSH) 0.9 %
10 SYRINGE (ML) INJECTION EVERY 12 HOURS SCHEDULED
Status: DISCONTINUED | OUTPATIENT
Start: 2020-01-01 | End: 2020-01-01 | Stop reason: HOSPADM

## 2020-01-01 RX ORDER — TRAMADOL HYDROCHLORIDE 50 MG/1
50 TABLET ORAL EVERY 6 HOURS PRN
Qty: 12 TABLET | Refills: 0 | Status: SHIPPED | OUTPATIENT
Start: 2020-01-01 | End: 2020-01-01

## 2020-01-01 RX ORDER — DOXYCYCLINE 100 MG/1
100 CAPSULE ORAL 2 TIMES DAILY
Qty: 14 CAPSULE | Refills: 0 | Status: ON HOLD | OUTPATIENT
Start: 2020-01-01 | End: 2020-01-01 | Stop reason: SDUPTHER

## 2020-01-01 RX ADMIN — AMLODIPINE BESYLATE 5 MG: 5 TABLET ORAL at 09:18

## 2020-01-01 RX ADMIN — AZTREONAM 1 G: 1 INJECTION, POWDER, LYOPHILIZED, FOR SOLUTION INTRAMUSCULAR; INTRAVENOUS at 14:00

## 2020-01-01 RX ADMIN — CLOPIDOGREL BISULFATE 75 MG: 75 TABLET ORAL at 09:17

## 2020-01-01 RX ADMIN — ISOSORBIDE MONONITRATE 30 MG: 30 TABLET, EXTENDED RELEASE ORAL at 08:55

## 2020-01-01 RX ADMIN — SODIUM CHLORIDE, PRESERVATIVE FREE 10 ML: 5 INJECTION INTRAVENOUS at 09:18

## 2020-01-01 RX ADMIN — AMLODIPINE BESYLATE 5 MG: 5 TABLET ORAL at 08:12

## 2020-01-01 RX ADMIN — AMIODARONE HYDROCHLORIDE 200 MG: 200 TABLET ORAL at 08:12

## 2020-01-01 RX ADMIN — AZTREONAM 1 G: 1 INJECTION, POWDER, LYOPHILIZED, FOR SOLUTION INTRAMUSCULAR; INTRAVENOUS at 01:23

## 2020-01-01 RX ADMIN — SODIUM CHLORIDE 100 ML/HR: 9 INJECTION, SOLUTION INTRAVENOUS at 21:54

## 2020-01-01 RX ADMIN — CLOPIDOGREL BISULFATE 75 MG: 75 TABLET ORAL at 09:58

## 2020-01-01 RX ADMIN — DOXYCYCLINE 100 MG: 100 CAPSULE ORAL at 05:52

## 2020-01-01 RX ADMIN — AMIODARONE HYDROCHLORIDE 200 MG: 200 TABLET ORAL at 09:18

## 2020-01-01 RX ADMIN — ATORVASTATIN CALCIUM 40 MG: 40 TABLET, FILM COATED ORAL at 21:21

## 2020-01-01 RX ADMIN — APIXABAN 2.5 MG: 2.5 TABLET, FILM COATED ORAL at 21:03

## 2020-01-01 RX ADMIN — ISOSORBIDE MONONITRATE 30 MG: 30 TABLET, EXTENDED RELEASE ORAL at 21:16

## 2020-01-01 RX ADMIN — APIXABAN 5 MG: 5 TABLET, FILM COATED ORAL at 21:47

## 2020-01-01 RX ADMIN — GABAPENTIN 600 MG: 300 CAPSULE ORAL at 20:20

## 2020-01-01 RX ADMIN — DONEPEZIL HYDROCHLORIDE 10 MG: 5 TABLET, FILM COATED ORAL at 20:20

## 2020-01-01 RX ADMIN — VANCOMYCIN HYDROCHLORIDE 1000 MG: 1 INJECTION, POWDER, LYOPHILIZED, FOR SOLUTION INTRAVENOUS at 21:21

## 2020-01-01 RX ADMIN — DOXYCYCLINE 100 MG: 100 CAPSULE ORAL at 14:39

## 2020-01-01 RX ADMIN — Medication 5 MG: at 20:20

## 2020-01-01 RX ADMIN — Medication 5 MG: at 21:48

## 2020-01-01 RX ADMIN — GABAPENTIN 600 MG: 300 CAPSULE ORAL at 08:55

## 2020-01-01 RX ADMIN — VANCOMYCIN HYDROCHLORIDE 1500 MG: 500 INJECTION, POWDER, LYOPHILIZED, FOR SOLUTION INTRAVENOUS at 20:43

## 2020-01-01 RX ADMIN — ATORVASTATIN CALCIUM 40 MG: 40 TABLET, FILM COATED ORAL at 21:16

## 2020-01-01 RX ADMIN — AMLODIPINE BESYLATE 5 MG: 5 TABLET ORAL at 14:23

## 2020-01-01 RX ADMIN — CLOPIDOGREL BISULFATE 75 MG: 75 TABLET ORAL at 10:04

## 2020-01-01 RX ADMIN — SODIUM CHLORIDE, PRESERVATIVE FREE 10 ML: 5 INJECTION INTRAVENOUS at 08:55

## 2020-01-01 RX ADMIN — ISOSORBIDE MONONITRATE 30 MG: 30 TABLET, EXTENDED RELEASE ORAL at 08:12

## 2020-01-01 RX ADMIN — FERROUS SULFATE TAB EC 324 MG (65 MG FE EQUIVALENT) 324 MG: 324 (65 FE) TABLET DELAYED RESPONSE at 12:10

## 2020-01-01 RX ADMIN — FUROSEMIDE 40 MG: 40 TABLET ORAL at 15:26

## 2020-01-01 RX ADMIN — FERROUS SULFATE TAB EC 324 MG (65 MG FE EQUIVALENT) 324 MG: 324 (65 FE) TABLET DELAYED RESPONSE at 08:13

## 2020-01-01 RX ADMIN — ALPRAZOLAM 0.5 MG: 0.5 TABLET ORAL at 20:20

## 2020-01-01 RX ADMIN — GABAPENTIN 600 MG: 300 CAPSULE ORAL at 10:05

## 2020-01-01 RX ADMIN — SODIUM CHLORIDE, PRESERVATIVE FREE 10 ML: 5 INJECTION INTRAVENOUS at 09:58

## 2020-01-01 RX ADMIN — GABAPENTIN 600 MG: 300 CAPSULE ORAL at 09:58

## 2020-01-01 RX ADMIN — FERROUS SULFATE TAB EC 324 MG (65 MG FE EQUIVALENT) 324 MG: 324 (65 FE) TABLET DELAYED RESPONSE at 09:17

## 2020-01-01 RX ADMIN — FERROUS SULFATE TAB EC 324 MG (65 MG FE EQUIVALENT) 324 MG: 324 (65 FE) TABLET DELAYED RESPONSE at 18:08

## 2020-01-01 RX ADMIN — SODIUM CHLORIDE, PRESERVATIVE FREE 10 ML: 5 INJECTION INTRAVENOUS at 21:03

## 2020-01-01 RX ADMIN — APIXABAN 2.5 MG: 2.5 TABLET, FILM COATED ORAL at 10:05

## 2020-01-01 RX ADMIN — DONEPEZIL HYDROCHLORIDE 10 MG: 5 TABLET, FILM COATED ORAL at 21:47

## 2020-01-01 RX ADMIN — ISOSORBIDE MONONITRATE 30 MG: 30 TABLET, EXTENDED RELEASE ORAL at 10:04

## 2020-01-01 RX ADMIN — FUROSEMIDE 40 MG: 10 INJECTION, SOLUTION INTRAMUSCULAR; INTRAVENOUS at 10:50

## 2020-01-01 RX ADMIN — LISINOPRIL 20 MG: 20 TABLET ORAL at 15:35

## 2020-01-01 RX ADMIN — FERRIC CARBOXYMALTOSE INJECTION 750 MG: 50 INJECTION, SOLUTION INTRAVENOUS at 15:30

## 2020-01-01 RX ADMIN — ATORVASTATIN CALCIUM 40 MG: 40 TABLET, FILM COATED ORAL at 21:03

## 2020-01-01 RX ADMIN — DONEPEZIL HYDROCHLORIDE 10 MG: 5 TABLET, FILM COATED ORAL at 21:16

## 2020-01-01 RX ADMIN — APIXABAN 2.5 MG: 2.5 TABLET, FILM COATED ORAL at 08:12

## 2020-01-01 RX ADMIN — GABAPENTIN 600 MG: 300 CAPSULE ORAL at 08:12

## 2020-01-01 RX ADMIN — ATORVASTATIN CALCIUM 40 MG: 40 TABLET, FILM COATED ORAL at 20:20

## 2020-01-01 RX ADMIN — AZTREONAM 1 G: 1 INJECTION, POWDER, LYOPHILIZED, FOR SOLUTION INTRAMUSCULAR; INTRAVENOUS at 03:11

## 2020-01-01 RX ADMIN — SODIUM CHLORIDE, PRESERVATIVE FREE 10 ML: 5 INJECTION INTRAVENOUS at 08:13

## 2020-01-01 RX ADMIN — AMLODIPINE BESYLATE 5 MG: 5 TABLET ORAL at 19:03

## 2020-01-01 RX ADMIN — FERROUS SULFATE TAB EC 324 MG (65 MG FE EQUIVALENT) 324 MG: 324 (65 FE) TABLET DELAYED RESPONSE at 21:16

## 2020-01-01 RX ADMIN — FERRIC CARBOXYMALTOSE INJECTION 750 MG: 50 INJECTION, SOLUTION INTRAVENOUS at 11:17

## 2020-01-01 RX ADMIN — ATORVASTATIN CALCIUM 40 MG: 40 TABLET, FILM COATED ORAL at 21:47

## 2020-01-01 RX ADMIN — APIXABAN 2.5 MG: 2.5 TABLET, FILM COATED ORAL at 21:16

## 2020-01-01 RX ADMIN — AZTREONAM 2 G: 1 INJECTION, POWDER, LYOPHILIZED, FOR SOLUTION INTRAMUSCULAR; INTRAVENOUS at 19:20

## 2020-01-01 RX ADMIN — AMIODARONE HYDROCHLORIDE 200 MG: 200 TABLET ORAL at 08:55

## 2020-01-01 RX ADMIN — APIXABAN 5 MG: 5 TABLET, FILM COATED ORAL at 09:57

## 2020-01-01 RX ADMIN — AZTREONAM 1 G: 1 INJECTION, POWDER, LYOPHILIZED, FOR SOLUTION INTRAMUSCULAR; INTRAVENOUS at 02:34

## 2020-01-01 RX ADMIN — APIXABAN 5 MG: 5 TABLET, FILM COATED ORAL at 21:21

## 2020-01-01 RX ADMIN — ISOSORBIDE MONONITRATE 30 MG: 30 TABLET, EXTENDED RELEASE ORAL at 21:03

## 2020-01-01 RX ADMIN — AMIODARONE HYDROCHLORIDE 200 MG: 200 TABLET ORAL at 09:58

## 2020-01-01 RX ADMIN — GABAPENTIN 600 MG: 300 CAPSULE ORAL at 21:03

## 2020-01-01 RX ADMIN — SODIUM CHLORIDE, PRESERVATIVE FREE 10 ML: 5 INJECTION INTRAVENOUS at 21:00

## 2020-01-01 RX ADMIN — APIXABAN 2.5 MG: 2.5 TABLET, FILM COATED ORAL at 09:18

## 2020-01-01 RX ADMIN — ISOSORBIDE MONONITRATE 30 MG: 30 TABLET, EXTENDED RELEASE ORAL at 09:17

## 2020-01-01 RX ADMIN — ALPRAZOLAM 0.5 MG: 0.5 TABLET ORAL at 10:04

## 2020-01-01 RX ADMIN — DOXYCYCLINE 100 MG: 100 CAPSULE ORAL at 19:07

## 2020-01-01 RX ADMIN — AMIODARONE HYDROCHLORIDE 200 MG: 200 TABLET ORAL at 10:04

## 2020-01-01 RX ADMIN — APIXABAN 2.5 MG: 2.5 TABLET, FILM COATED ORAL at 20:20

## 2020-01-01 RX ADMIN — DONEPEZIL HYDROCHLORIDE 10 MG: 5 TABLET, FILM COATED ORAL at 21:21

## 2020-01-01 RX ADMIN — GABAPENTIN 600 MG: 300 CAPSULE ORAL at 21:21

## 2020-01-01 RX ADMIN — ISOSORBIDE MONONITRATE 30 MG: 30 TABLET, EXTENDED RELEASE ORAL at 21:21

## 2020-01-01 RX ADMIN — GABAPENTIN 600 MG: 300 CAPSULE ORAL at 21:47

## 2020-01-01 RX ADMIN — SODIUM CHLORIDE, PRESERVATIVE FREE 10 ML: 5 INJECTION INTRAVENOUS at 20:20

## 2020-01-01 RX ADMIN — ISOSORBIDE MONONITRATE 30 MG: 30 TABLET, EXTENDED RELEASE ORAL at 09:58

## 2020-01-01 RX ADMIN — AZTREONAM 1 G: 1 INJECTION, POWDER, LYOPHILIZED, FOR SOLUTION INTRAMUSCULAR; INTRAVENOUS at 13:31

## 2020-01-01 RX ADMIN — CLOPIDOGREL BISULFATE 75 MG: 75 TABLET ORAL at 08:55

## 2020-01-01 RX ADMIN — AZTREONAM 1 G: 1 INJECTION, POWDER, LYOPHILIZED, FOR SOLUTION INTRAMUSCULAR; INTRAVENOUS at 14:23

## 2020-01-01 RX ADMIN — GABAPENTIN 600 MG: 300 CAPSULE ORAL at 09:17

## 2020-01-01 RX ADMIN — CLOPIDOGREL BISULFATE 75 MG: 75 TABLET ORAL at 08:12

## 2020-01-01 RX ADMIN — ISOSORBIDE MONONITRATE 30 MG: 30 TABLET, EXTENDED RELEASE ORAL at 21:47

## 2020-01-01 RX ADMIN — AZTREONAM 1 G: 1 INJECTION, POWDER, LYOPHILIZED, FOR SOLUTION INTRAMUSCULAR; INTRAVENOUS at 13:42

## 2020-01-01 RX ADMIN — GABAPENTIN 600 MG: 300 CAPSULE ORAL at 21:16

## 2020-01-01 RX ADMIN — AZTREONAM 1 G: 1 INJECTION, SOLUTION INTRAVENOUS at 01:58

## 2020-01-01 RX ADMIN — ISOSORBIDE MONONITRATE 30 MG: 30 TABLET, EXTENDED RELEASE ORAL at 20:20

## 2020-01-01 RX ADMIN — ACETAMINOPHEN 650 MG: 325 TABLET, FILM COATED ORAL at 21:06

## 2020-01-01 RX ADMIN — DONEPEZIL HYDROCHLORIDE 10 MG: 5 TABLET, FILM COATED ORAL at 21:03

## 2020-01-01 RX ADMIN — APIXABAN 5 MG: 5 TABLET, FILM COATED ORAL at 08:55

## 2020-01-04 ENCOUNTER — APPOINTMENT (OUTPATIENT)
Dept: GENERAL RADIOLOGY | Facility: HOSPITAL | Age: 75
End: 2020-01-04

## 2020-01-04 ENCOUNTER — HOSPITAL ENCOUNTER (EMERGENCY)
Facility: HOSPITAL | Age: 75
Discharge: HOME | End: 2020-01-04
Attending: EMERGENCY MEDICINE | Admitting: EMERGENCY MEDICINE

## 2020-01-04 VITALS
DIASTOLIC BLOOD PRESSURE: 64 MMHG | TEMPERATURE: 98.3 F | RESPIRATION RATE: 18 BRPM | HEART RATE: 54 BPM | OXYGEN SATURATION: 98 % | SYSTOLIC BLOOD PRESSURE: 135 MMHG

## 2020-01-04 DIAGNOSIS — R07.9 CHEST PAIN, UNSPECIFIED TYPE: Primary | ICD-10-CM

## 2020-01-04 LAB
ALBUMIN SERPL-MCNC: 4.1 G/DL (ref 3.5–5.2)
ALBUMIN/GLOB SERPL: 1.5 G/DL
ALP SERPL-CCNC: 92 U/L (ref 39–117)
ALT SERPL W P-5'-P-CCNC: 12 U/L (ref 1–33)
ANION GAP SERPL CALCULATED.3IONS-SCNC: 15.3 MMOL/L (ref 5–15)
AST SERPL-CCNC: 19 U/L (ref 1–32)
BASOPHILS # BLD AUTO: 0.03 10*3/MM3 (ref 0–0.2)
BASOPHILS NFR BLD AUTO: 0.5 % (ref 0–1.5)
BILIRUB SERPL-MCNC: 0.4 MG/DL (ref 0.2–1.2)
BUN BLD-MCNC: 36 MG/DL (ref 8–23)
BUN/CREAT SERPL: 15.1 (ref 7–25)
CALCIUM SPEC-SCNC: 9.9 MG/DL (ref 8.6–10.5)
CHLORIDE SERPL-SCNC: 102 MMOL/L (ref 98–107)
CO2 SERPL-SCNC: 24.7 MMOL/L (ref 22–29)
CREAT BLD-MCNC: 2.38 MG/DL (ref 0.57–1)
DEPRECATED RDW RBC AUTO: 47.7 FL (ref 37–54)
EOSINOPHIL # BLD AUTO: 0.06 10*3/MM3 (ref 0–0.4)
EOSINOPHIL NFR BLD AUTO: 1.1 % (ref 0.3–6.2)
ERYTHROCYTE [DISTWIDTH] IN BLOOD BY AUTOMATED COUNT: 13.8 % (ref 12.3–15.4)
GFR SERPL CREATININE-BSD FRML MDRD: 20 ML/MIN/1.73
GLOBULIN UR ELPH-MCNC: 2.8 GM/DL
GLUCOSE BLD-MCNC: 106 MG/DL (ref 65–99)
HCT VFR BLD AUTO: 33.4 % (ref 34–46.6)
HGB BLD-MCNC: 10.6 G/DL (ref 12–15.9)
HOLD SPECIMEN: NORMAL
IMM GRANULOCYTES # BLD AUTO: 0.02 10*3/MM3 (ref 0–0.05)
IMM GRANULOCYTES NFR BLD AUTO: 0.4 % (ref 0–0.5)
LYMPHOCYTES # BLD AUTO: 1.34 10*3/MM3 (ref 0.7–3.1)
LYMPHOCYTES NFR BLD AUTO: 24.1 % (ref 19.6–45.3)
MCH RBC QN AUTO: 30.1 PG (ref 26.6–33)
MCHC RBC AUTO-ENTMCNC: 31.7 G/DL (ref 31.5–35.7)
MCV RBC AUTO: 94.9 FL (ref 79–97)
MONOCYTES # BLD AUTO: 0.36 10*3/MM3 (ref 0.1–0.9)
MONOCYTES NFR BLD AUTO: 6.5 % (ref 5–12)
NEUTROPHILS # BLD AUTO: 3.74 10*3/MM3 (ref 1.7–7)
NEUTROPHILS NFR BLD AUTO: 67.4 % (ref 42.7–76)
NRBC BLD AUTO-RTO: 0 /100 WBC (ref 0–0.2)
PLATELET # BLD AUTO: 228 10*3/MM3 (ref 140–450)
PMV BLD AUTO: 10.7 FL (ref 6–12)
POTASSIUM BLD-SCNC: 4 MMOL/L (ref 3.5–5.2)
PROT SERPL-MCNC: 6.9 G/DL (ref 6–8.5)
RBC # BLD AUTO: 3.52 10*6/MM3 (ref 3.77–5.28)
SODIUM BLD-SCNC: 142 MMOL/L (ref 136–145)
TROPONIN T SERPL-MCNC: 0.02 NG/ML (ref 0–0.03)
TROPONIN T SERPL-MCNC: 0.02 NG/ML (ref 0–0.03)
WBC NRBC COR # BLD: 5.55 10*3/MM3 (ref 3.4–10.8)
WHOLE BLOOD HOLD SPECIMEN: NORMAL

## 2020-01-04 PROCEDURE — 80053 COMPREHEN METABOLIC PANEL: CPT | Performed by: EMERGENCY MEDICINE

## 2020-01-04 PROCEDURE — 84484 ASSAY OF TROPONIN QUANT: CPT | Performed by: EMERGENCY MEDICINE

## 2020-01-04 PROCEDURE — 99284 EMERGENCY DEPT VISIT MOD MDM: CPT

## 2020-01-04 PROCEDURE — 93005 ELECTROCARDIOGRAM TRACING: CPT | Performed by: EMERGENCY MEDICINE

## 2020-01-04 PROCEDURE — 71045 X-RAY EXAM CHEST 1 VIEW: CPT

## 2020-01-04 PROCEDURE — 85025 COMPLETE CBC W/AUTO DIFF WBC: CPT | Performed by: EMERGENCY MEDICINE

## 2020-01-04 RX ORDER — NITROGLYCERIN 0.4 MG/1
0.4 TABLET SUBLINGUAL
Status: DISCONTINUED | OUTPATIENT
Start: 2020-01-04 | End: 2020-01-04 | Stop reason: HOSPADM

## 2020-01-04 RX ORDER — SODIUM CHLORIDE 0.9 % (FLUSH) 0.9 %
10 SYRINGE (ML) INJECTION AS NEEDED
Status: DISCONTINUED | OUTPATIENT
Start: 2020-01-04 | End: 2020-01-04 | Stop reason: HOSPADM

## 2020-01-04 RX ORDER — ASPIRIN 325 MG
325 TABLET ORAL ONCE
Status: COMPLETED | OUTPATIENT
Start: 2020-01-04 | End: 2020-01-04

## 2020-01-04 RX ADMIN — NITROGLYCERIN 0.4 MG: 0.4 TABLET SUBLINGUAL at 16:16

## 2020-01-04 RX ADMIN — ASPIRIN 325 MG ORAL TABLET 325 MG: 325 PILL ORAL at 15:30

## 2020-01-04 RX ADMIN — NITROGLYCERIN 0.4 MG: 0.4 TABLET SUBLINGUAL at 16:10

## 2020-01-04 NOTE — ED PROVIDER NOTES
Subjective   History of Present Illness    Chief Complaint: Chest pain  History of Present Illness: 74-year-old female with history of dementia, chronic kidney disease, atrial fibrillation on Eliquis, presents with chest pain earlier today.  Onset: Morning  Duration: Single episode now resolved  Exacerbating / Alleviating factors: None  Associated symptoms: None      Nurses Notes reviewed and agree, including vitals, allergies, social history and prior medical history.     REVIEW OF SYSTEMS: All systems reviewed and not pertinent unless noted.    Positive for: Resolved chest pain    Negative for: Cough hemoptysis fever syncope  Review of Systems    Past Medical History:   Diagnosis Date   • Alzheimer's dementia (CMS/HCC) 04/07/2019   • Anemia    • Arthritis    • Breast cancer (CMS/HCC)     naveed mastectomy   • Cancer (CMS/HCC)     breast   • Congestive heart failure (CMS/HCC)    • Diabetes mellitus (CMS/[facility])     DIET CONTROLLED   • Disease of thyroid gland    • Hearing loss    • Heart disease    • Hyperlipidemia    • Hypertension    • Kidney disease    • Sleep apnea        Allergies   Allergen Reactions   • Cephalosporins Anaphylaxis   • Quinolones Anaphylaxis   • Ciprofloxacin    • Keflex [Cephalexin] Hives       Past Surgical History:   Procedure Laterality Date   • BACK SURGERY  2015   • BREAST BIOPSY  1998   • BREAST EXCISIONAL BIOPSY Left 02/08/1999   • BREAST RECONSTRUCTION Bilateral    • CAROTID ARTERY ANGIOPLASTY Right 2008    ([facility])   • CATARACT EXTRACTION Bilateral    • COLONOSCOPY N/A 8/17/2017    Procedure: COLONOSCOPY;  Surgeon: Sindy Clement MD;  Location: [facility] ENDOSCOPY;  Service:    • HYSTERECTOMY     • MASTECTOMY Bilateral 2010       Family History   Problem Relation Age of Onset   • Cancer Mother         breast   • Diabetes Mother    • Heart disease Father    • Stroke Father    • Hypertension Father    • Breast cancer Maternal Grandmother    • Breast cancer Paternal  Grandmother        Social History     Socioeconomic History   • Marital status: Single     Spouse name: Not on file   • Number of children: Not on file   • Years of education: Not on file   • Highest education level: Not on file   Tobacco Use   • Smoking status: Former Smoker     Last attempt to quit: 2003     Years since quittin.5   • Smokeless tobacco: Never Used   Substance and Sexual Activity   • Alcohol use: No     Frequency: Never   • Drug use: No   • Sexual activity: Not Currently           Objective   Physical Exam      GENERAL APPEARANCE: Well developed, well nourished, in no acute distress.  VITAL SIGNS: per nursing, reviewed and noted  SKIN: no rashes, ulcerations or petechiae.  Head: Normocephalic, atraumatic.   EYES: perrla. EOMI.  ENT:  TM clear, posterior pharynx patent.  LUNGS:  normal breath sounds. No retractions.   CARDIOVASCULAR:  regular rate and rhythm, no murmurs.  Good Peripheral pulses.  ABDOMEN: Soft, nontender, normal bowel sounds. No hernia. No ascites.  MUSCULOSKELETAL:  No tenderness. Full ROM. Strength and tone normal.  NEUROLOGIC: Alert, oriented x 3. No gross deficits.   NECK: Supple, symmetric. No tenderness, no masses. Full ROM  Back: full rom, no paraspinal spasm. No CVA tenderness.   PSYCH: appropriate affect,   : no bladder tenderness or distention, no CVA tenderness      Procedures       No attending provider procedures were performed    ED Course    EKG interpreted by me reveals sinus bradycardia rate 59.  First-degree block.  No ectopy or ischemic changes.  ED Course as of 811   Sat 2020   1603 BUN(!): 36 [PF]   1603 Creatinine(!): 2.38 [PF]   1603 WBC: 5.55 [PF]   1603 Hemoglobin(!): 10.6 [PF]   1603 Hematocrit(!): 33.4 [PF]   1603 Platelets: 228 [PF]   1603 Troponin T: 0.018 [PF]   1603 FINDINGS: Atherosclerosis is noted. The heart is normal in size. The  mediastinum is unremarkable. Prominent interstitial markings, likely  chronic. There is no  pneumothorax.  There are no acute osseous  abnormalities. Surgical clips are seen in the left axilla.     IMPRESSION:  No acute cardiopulmonary process.    [PF]      ED Course User Index  [PF] Barrera Dill,                                                MDM  No evidence of ACS on initial work-up.  Patient pain-free.  Patient nontoxic.  Serial troponins pending.  Will discharge home in stable condition with outpatient follow-up with second probably negative.      Final diagnoses:   Chest pain, unspecified type            Barrera Dill DO  01/06/20 0811

## 2020-01-14 ENCOUNTER — APPOINTMENT (OUTPATIENT)
Dept: INFUSION THERAPY | Facility: HOSPITAL | Age: 75
End: 2020-01-14

## 2020-01-15 ENCOUNTER — HOSPITAL ENCOUNTER (OUTPATIENT)
Dept: INFUSION THERAPY | Facility: HOSPITAL | Age: 75
Discharge: HOME | End: 2020-01-15
Admitting: INTERNAL MEDICINE

## 2020-01-15 VITALS
DIASTOLIC BLOOD PRESSURE: 50 MMHG | RESPIRATION RATE: 18 BRPM | HEART RATE: 56 BPM | OXYGEN SATURATION: 98 % | TEMPERATURE: 97.7 F | SYSTOLIC BLOOD PRESSURE: 144 MMHG

## 2020-01-15 DIAGNOSIS — D50.9 IRON DEFICIENCY ANEMIA, UNSPECIFIED IRON DEFICIENCY ANEMIA TYPE: Primary | ICD-10-CM

## 2020-01-15 DIAGNOSIS — N18.4 CHRONIC KIDNEY DISEASE, STAGE IV (SEVERE) (HCC): ICD-10-CM

## 2020-01-15 LAB
DEPRECATED RDW RBC AUTO: 54.3 FL (ref 37–54)
ERYTHROCYTE [DISTWIDTH] IN BLOOD BY AUTOMATED COUNT: 15.3 % (ref 12.3–15.4)
HCT VFR BLD AUTO: 25.7 % (ref 34–46.6)
HGB BLD-MCNC: 8.3 G/DL (ref 12–15.9)
MCH RBC QN AUTO: 31.6 PG (ref 26.6–33)
MCHC RBC AUTO-ENTMCNC: 32.3 G/DL (ref 31.5–35.7)
MCV RBC AUTO: 97.7 FL (ref 79–97)
PLATELET # BLD AUTO: 211 10*3/MM3 (ref 140–450)
PMV BLD AUTO: 10.7 FL (ref 6–12)
RBC # BLD AUTO: 2.63 10*6/MM3 (ref 3.77–5.28)
WBC NRBC COR # BLD: 4.61 10*3/MM3 (ref 3.4–10.8)

## 2020-01-15 PROCEDURE — 85027 COMPLETE CBC AUTOMATED: CPT | Performed by: INTERNAL MEDICINE

## 2020-01-15 PROCEDURE — 96372 THER/PROPH/DIAG INJ SC/IM: CPT

## 2020-01-15 PROCEDURE — 36415 COLL VENOUS BLD VENIPUNCTURE: CPT

## 2020-01-15 PROCEDURE — 25010000002 EPOETIN ALFA PER 1000 UNITS: Performed by: PHYSICIAN ASSISTANT

## 2020-01-15 RX ORDER — LOSARTAN POTASSIUM 100 MG/1
100 TABLET ORAL DAILY
Status: ON HOLD | COMMUNITY
End: 2020-01-01

## 2020-01-15 RX ADMIN — ERYTHROPOIETIN 40000 UNITS: 40000 INJECTION, SOLUTION INTRAVENOUS; SUBCUTANEOUS at 15:34

## 2020-02-12 ENCOUNTER — HOSPITAL ENCOUNTER (OUTPATIENT)
Dept: INFUSION THERAPY | Facility: HOSPITAL | Age: 75
Setting detail: INFUSION SERIES
Discharge: HOME | End: 2020-02-12
Admitting: FAMILY MEDICINE

## 2020-02-12 VITALS
DIASTOLIC BLOOD PRESSURE: 58 MMHG | WEIGHT: 162 LBS | TEMPERATURE: 97.7 F | HEIGHT: 64 IN | RESPIRATION RATE: 18 BRPM | OXYGEN SATURATION: 100 % | HEART RATE: 54 BPM | BODY MASS INDEX: 27.66 KG/M2 | SYSTOLIC BLOOD PRESSURE: 103 MMHG

## 2020-02-12 DIAGNOSIS — D63.8 ANEMIA, CHRONIC DISEASE: Primary | ICD-10-CM

## 2020-02-12 DIAGNOSIS — N18.4 CHRONIC KIDNEY DISEASE, STAGE IV (SEVERE) (HCC): ICD-10-CM

## 2020-02-12 DIAGNOSIS — D50.9 IRON DEFICIENCY ANEMIA, UNSPECIFIED IRON DEFICIENCY ANEMIA TYPE: ICD-10-CM

## 2020-02-12 LAB
DEPRECATED RDW RBC AUTO: 50.3 FL (ref 37–54)
ERYTHROCYTE [DISTWIDTH] IN BLOOD BY AUTOMATED COUNT: 14.3 % (ref 12.3–15.4)
HCT VFR BLD AUTO: 33.1 % (ref 34–46.6)
HGB BLD-MCNC: 10.5 G/DL (ref 12–15.9)
MCH RBC QN AUTO: 30.4 PG (ref 26.6–33)
MCHC RBC AUTO-ENTMCNC: 31.7 G/DL (ref 31.5–35.7)
MCV RBC AUTO: 95.9 FL (ref 79–97)
PLATELET # BLD AUTO: 258 10*3/MM3 (ref 140–450)
PMV BLD AUTO: 10.6 FL (ref 6–12)
RBC # BLD AUTO: 3.45 10*6/MM3 (ref 3.77–5.28)
WBC NRBC COR # BLD: 4.48 10*3/MM3 (ref 3.4–10.8)

## 2020-02-12 PROCEDURE — 85027 COMPLETE CBC AUTOMATED: CPT | Performed by: FAMILY MEDICINE

## 2020-02-12 PROCEDURE — 36415 COLL VENOUS BLD VENIPUNCTURE: CPT

## 2020-02-12 RX ORDER — DONEPEZIL HYDROCHLORIDE 10 MG/1
10 TABLET, FILM COATED ORAL NIGHTLY
COMMUNITY
End: 2020-03-11 | Stop reason: SDUPTHER

## 2020-02-12 RX ORDER — LOPERAMIDE HYDROCHLORIDE 2 MG/1
2 CAPSULE ORAL 4 TIMES DAILY PRN
COMMUNITY
End: 2020-01-01 | Stop reason: HOSPADM

## 2020-03-11 ENCOUNTER — HOSPITAL ENCOUNTER (OUTPATIENT)
Dept: INFUSION THERAPY | Facility: HOSPITAL | Age: 75
Discharge: HOME | End: 2020-03-11
Admitting: INTERNAL MEDICINE

## 2020-03-11 VITALS
TEMPERATURE: 97.2 F | WEIGHT: 162 LBS | HEART RATE: 60 BPM | OXYGEN SATURATION: 100 % | DIASTOLIC BLOOD PRESSURE: 58 MMHG | RESPIRATION RATE: 17 BRPM | SYSTOLIC BLOOD PRESSURE: 174 MMHG | BODY MASS INDEX: 27.83 KG/M2

## 2020-03-11 DIAGNOSIS — N18.4 CHRONIC KIDNEY DISEASE, STAGE IV (SEVERE) (HCC): Primary | ICD-10-CM

## 2020-03-11 DIAGNOSIS — D50.9 IRON DEFICIENCY ANEMIA, UNSPECIFIED IRON DEFICIENCY ANEMIA TYPE: ICD-10-CM

## 2020-03-11 LAB
DEPRECATED RDW RBC AUTO: 53.8 FL (ref 37–54)
ERYTHROCYTE [DISTWIDTH] IN BLOOD BY AUTOMATED COUNT: 15.4 % (ref 12.3–15.4)
HCT VFR BLD AUTO: 25 % (ref 34–46.6)
HGB BLD-MCNC: 7.8 G/DL (ref 12–15.9)
IRON 24H UR-MRATE: 32 MCG/DL (ref 37–145)
IRON SATN MFR SERPL: 11 % (ref 20–50)
MCH RBC QN AUTO: 30 PG (ref 26.6–33)
MCHC RBC AUTO-ENTMCNC: 31.2 G/DL (ref 31.5–35.7)
MCV RBC AUTO: 96.2 FL (ref 79–97)
PLATELET # BLD AUTO: 216 10*3/MM3 (ref 140–450)
PMV BLD AUTO: 10.8 FL (ref 6–12)
RBC # BLD AUTO: 2.6 10*6/MM3 (ref 3.77–5.28)
TIBC SERPL-MCNC: 291 MCG/DL (ref 298–536)
TRANSFERRIN SERPL-MCNC: 195 MG/DL (ref 200–360)
WBC NRBC COR # BLD: 5.73 10*3/MM3 (ref 3.4–10.8)

## 2020-03-11 PROCEDURE — 25010000002 EPOETIN ALFA PER 1000 UNITS: Performed by: PHYSICIAN ASSISTANT

## 2020-03-11 PROCEDURE — 85027 COMPLETE CBC AUTOMATED: CPT | Performed by: PHYSICIAN ASSISTANT

## 2020-03-11 PROCEDURE — 96372 THER/PROPH/DIAG INJ SC/IM: CPT

## 2020-03-11 PROCEDURE — 83540 ASSAY OF IRON: CPT | Performed by: PHYSICIAN ASSISTANT

## 2020-03-11 PROCEDURE — 36415 COLL VENOUS BLD VENIPUNCTURE: CPT

## 2020-03-11 PROCEDURE — 84466 ASSAY OF TRANSFERRIN: CPT | Performed by: PHYSICIAN ASSISTANT

## 2020-03-11 RX ADMIN — ERYTHROPOIETIN 40000 UNITS: 40000 INJECTION, SOLUTION INTRAVENOUS; SUBCUTANEOUS at 15:22

## 2020-03-19 ENCOUNTER — TELEPHONE (OUTPATIENT)
Dept: INFUSION THERAPY | Facility: HOSPITAL | Age: 75
End: 2020-03-19

## 2020-03-19 RX ORDER — FUROSEMIDE 40 MG/1
40 TABLET ORAL ONCE
Status: CANCELLED
Start: 2020-03-20

## 2020-03-20 ENCOUNTER — HOSPITAL ENCOUNTER (OUTPATIENT)
Dept: INFUSION THERAPY | Facility: HOSPITAL | Age: 75
Setting detail: INFUSION SERIES
Discharge: HOME | End: 2020-03-20

## 2020-03-20 VITALS
HEART RATE: 56 BPM | TEMPERATURE: 98.4 F | OXYGEN SATURATION: 99 % | SYSTOLIC BLOOD PRESSURE: 180 MMHG | RESPIRATION RATE: 18 BRPM | DIASTOLIC BLOOD PRESSURE: 62 MMHG

## 2020-03-20 DIAGNOSIS — N17.9 ACUTE RENAL FAILURE SUPERIMPOSED ON STAGE 4 CHRONIC KIDNEY DISEASE, UNSPECIFIED ACUTE RENAL FAILURE TYPE (HCC): ICD-10-CM

## 2020-03-20 DIAGNOSIS — D63.1 ANEMIA DUE TO STAGE 4 CHRONIC KIDNEY DISEASE (HCC): Primary | ICD-10-CM

## 2020-03-20 DIAGNOSIS — N18.4 ACUTE RENAL FAILURE SUPERIMPOSED ON STAGE 4 CHRONIC KIDNEY DISEASE, UNSPECIFIED ACUTE RENAL FAILURE TYPE (HCC): ICD-10-CM

## 2020-03-20 DIAGNOSIS — N18.4 ANEMIA DUE TO STAGE 4 CHRONIC KIDNEY DISEASE (HCC): Primary | ICD-10-CM

## 2020-03-20 LAB
ABO GROUP BLD: NORMAL
ALBUMIN SERPL-MCNC: 3.4 G/DL (ref 3.5–5.2)
ANION GAP SERPL CALCULATED.3IONS-SCNC: 14.5 MMOL/L (ref 5–15)
BLD GP AB SCN SERPL QL: NEGATIVE
BUN BLD-MCNC: 27 MG/DL (ref 8–23)
BUN/CREAT SERPL: 12.2 (ref 7–25)
CALCIUM SPEC-SCNC: 9.2 MG/DL (ref 8.6–10.5)
CHLORIDE SERPL-SCNC: 107 MMOL/L (ref 98–107)
CO2 SERPL-SCNC: 20.5 MMOL/L (ref 22–29)
CREAT BLD-MCNC: 2.22 MG/DL (ref 0.57–1)
DEPRECATED RDW RBC AUTO: 57.7 FL (ref 37–54)
ERYTHROCYTE [DISTWIDTH] IN BLOOD BY AUTOMATED COUNT: 16.2 % (ref 12.3–15.4)
GFR SERPL CREATININE-BSD FRML MDRD: 22 ML/MIN/1.73
GLUCOSE BLD-MCNC: 117 MG/DL (ref 65–99)
HCT VFR BLD AUTO: 21.9 % (ref 34–46.6)
HGB BLD-MCNC: 6.7 G/DL (ref 12–15.9)
IRON 24H UR-MRATE: 21 MCG/DL (ref 37–145)
IRON SATN MFR SERPL: 8 % (ref 20–50)
MCH RBC QN AUTO: 29.6 PG (ref 26.6–33)
MCHC RBC AUTO-ENTMCNC: 30.6 G/DL (ref 31.5–35.7)
MCV RBC AUTO: 96.9 FL (ref 79–97)
PHOSPHATE SERPL-MCNC: 3.5 MG/DL (ref 2.5–4.5)
PLATELET # BLD AUTO: 321 10*3/MM3 (ref 140–450)
PMV BLD AUTO: 10.3 FL (ref 6–12)
POTASSIUM BLD-SCNC: 4.4 MMOL/L (ref 3.5–5.2)
PTH-INTACT SERPL-MCNC: 66.8 PG/ML (ref 15–65)
RBC # BLD AUTO: 2.26 10*6/MM3 (ref 3.77–5.28)
RH BLD: POSITIVE
SODIUM BLD-SCNC: 142 MMOL/L (ref 136–145)
T&S EXPIRATION DATE: NORMAL
TIBC SERPL-MCNC: 250 MCG/DL (ref 298–536)
TRANSFERRIN SERPL-MCNC: 168 MG/DL (ref 200–360)
URATE SERPL-MCNC: 6.3 MG/DL (ref 2.4–5.7)
WBC NRBC COR # BLD: 4.24 10*3/MM3 (ref 3.4–10.8)

## 2020-03-20 PROCEDURE — 84466 ASSAY OF TRANSFERRIN: CPT | Performed by: PHYSICIAN ASSISTANT

## 2020-03-20 PROCEDURE — 84550 ASSAY OF BLOOD/URIC ACID: CPT | Performed by: PHYSICIAN ASSISTANT

## 2020-03-20 PROCEDURE — 85027 COMPLETE CBC AUTOMATED: CPT | Performed by: PHYSICIAN ASSISTANT

## 2020-03-20 PROCEDURE — 96365 THER/PROPH/DIAG IV INF INIT: CPT

## 2020-03-20 PROCEDURE — 83540 ASSAY OF IRON: CPT | Performed by: PHYSICIAN ASSISTANT

## 2020-03-20 PROCEDURE — 80069 RENAL FUNCTION PANEL: CPT | Performed by: PHYSICIAN ASSISTANT

## 2020-03-20 PROCEDURE — 96374 THER/PROPH/DIAG INJ IV PUSH: CPT

## 2020-03-20 PROCEDURE — 83970 ASSAY OF PARATHORMONE: CPT | Performed by: PHYSICIAN ASSISTANT

## 2020-03-20 PROCEDURE — 25010000002 FUROSEMIDE PER 20 MG: Performed by: PHYSICIAN ASSISTANT

## 2020-03-20 PROCEDURE — 25010000002 FERRIC CARBOXYMALTOSE 750 MG/15ML SOLUTION 15 ML VIAL: Performed by: PHYSICIAN ASSISTANT

## 2020-03-20 PROCEDURE — 86850 RBC ANTIBODY SCREEN: CPT | Performed by: PHYSICIAN ASSISTANT

## 2020-03-20 PROCEDURE — 86901 BLOOD TYPING SEROLOGIC RH(D): CPT | Performed by: PHYSICIAN ASSISTANT

## 2020-03-20 PROCEDURE — 96375 TX/PRO/DX INJ NEW DRUG ADDON: CPT

## 2020-03-20 PROCEDURE — 86920 COMPATIBILITY TEST SPIN: CPT

## 2020-03-20 PROCEDURE — 86900 BLOOD TYPING SEROLOGIC ABO: CPT | Performed by: PHYSICIAN ASSISTANT

## 2020-03-20 RX ORDER — FUROSEMIDE 40 MG/1
40 TABLET ORAL ONCE
Status: CANCELLED
Start: 2020-03-27

## 2020-03-20 RX ORDER — SODIUM CHLORIDE 9 MG/ML
250 INJECTION, SOLUTION INTRAVENOUS ONCE
Status: CANCELLED | OUTPATIENT
Start: 2020-03-27

## 2020-03-20 RX ORDER — SODIUM CHLORIDE 9 MG/ML
250 INJECTION, SOLUTION INTRAVENOUS ONCE
Status: DISCONTINUED | OUTPATIENT
Start: 2020-03-20 | End: 2020-03-22 | Stop reason: HOSPADM

## 2020-03-20 RX ORDER — FUROSEMIDE 10 MG/ML
40 INJECTION INTRAMUSCULAR; INTRAVENOUS ONCE
Status: COMPLETED | OUTPATIENT
Start: 2020-03-20 | End: 2020-03-20

## 2020-03-20 RX ADMIN — FERRIC CARBOXYMALTOSE INJECTION 750 MG: 50 INJECTION, SOLUTION INTRAVENOUS at 14:56

## 2020-03-20 RX ADMIN — FUROSEMIDE 40 MG: 10 INJECTION, SOLUTION INTRAMUSCULAR; INTRAVENOUS at 14:46

## 2020-03-20 NOTE — CODE DOCUMENTATION
CALLED DR. MARIN'S OFFICE ABOUT BLOOD PRESSURE /70. PER RONNIE BOOGIE, GIVE PATIENT 40MG IV LASIX BEFORE IRON INFUSION AND FOR PATIENT TO RECEIVE INFUSION TODAY

## 2020-03-20 NOTE — CODE DOCUMENTATION
Instructed pt to go to ED if becomes SOA, c/o chest pain, increased weakness, or light headedness. Pt verbalized understanding. Pt discharged via w/c in care of friend Ashley.

## 2020-03-23 ENCOUNTER — HOSPITAL ENCOUNTER (OUTPATIENT)
Dept: INFUSION THERAPY | Facility: HOSPITAL | Age: 75
Setting detail: INFUSION SERIES
Discharge: HOME | End: 2020-03-23

## 2020-03-23 VITALS
SYSTOLIC BLOOD PRESSURE: 150 MMHG | DIASTOLIC BLOOD PRESSURE: 68 MMHG | HEART RATE: 58 BPM | RESPIRATION RATE: 18 BRPM | OXYGEN SATURATION: 98 % | TEMPERATURE: 97.8 F

## 2020-03-23 DIAGNOSIS — D63.1 ANEMIA DUE TO STAGE 4 CHRONIC KIDNEY DISEASE (HCC): ICD-10-CM

## 2020-03-23 DIAGNOSIS — N18.4 ACUTE RENAL FAILURE SUPERIMPOSED ON STAGE 4 CHRONIC KIDNEY DISEASE, UNSPECIFIED ACUTE RENAL FAILURE TYPE (HCC): ICD-10-CM

## 2020-03-23 DIAGNOSIS — N18.4 ANEMIA DUE TO STAGE 4 CHRONIC KIDNEY DISEASE (HCC): ICD-10-CM

## 2020-03-23 DIAGNOSIS — N17.9 ACUTE RENAL FAILURE SUPERIMPOSED ON STAGE 4 CHRONIC KIDNEY DISEASE, UNSPECIFIED ACUTE RENAL FAILURE TYPE (HCC): ICD-10-CM

## 2020-03-23 PROCEDURE — 86900 BLOOD TYPING SEROLOGIC ABO: CPT

## 2020-03-23 PROCEDURE — 36430 TRANSFUSION BLD/BLD COMPNT: CPT

## 2020-03-23 PROCEDURE — 25010000002 FUROSEMIDE PER 20 MG: Performed by: PHYSICIAN ASSISTANT

## 2020-03-23 PROCEDURE — 96374 THER/PROPH/DIAG INJ IV PUSH: CPT

## 2020-03-23 PROCEDURE — P9016 RBC LEUKOCYTES REDUCED: HCPCS

## 2020-03-23 RX ORDER — SODIUM CHLORIDE 9 MG/ML
250 INJECTION, SOLUTION INTRAVENOUS AS NEEDED
Status: CANCELLED | OUTPATIENT
Start: 2020-03-23

## 2020-03-23 RX ORDER — SODIUM CHLORIDE 9 MG/ML
250 INJECTION, SOLUTION INTRAVENOUS AS NEEDED
Status: DISCONTINUED | OUTPATIENT
Start: 2020-03-23 | End: 2020-03-25 | Stop reason: HOSPADM

## 2020-03-23 RX ORDER — FUROSEMIDE 10 MG/ML
40 INJECTION INTRAMUSCULAR; INTRAVENOUS ONCE
Status: COMPLETED | OUTPATIENT
Start: 2020-03-23 | End: 2020-03-23

## 2020-03-23 RX ADMIN — SODIUM CHLORIDE 250 ML: 9 INJECTION, SOLUTION INTRAVENOUS at 13:22

## 2020-03-23 RX ADMIN — FUROSEMIDE 40 MG: 10 INJECTION, SOLUTION INTRAMUSCULAR; INTRAVENOUS at 13:42

## 2020-03-23 NOTE — ADDENDUM NOTE
Encounter addended by: Merline Gallardo RN on: 3/23/2020 8:34 AM   Actions taken: Visit diagnoses modified, Diagnosis association updated, Order list changed

## 2020-03-24 LAB
ABO + RH BLD: NORMAL
BH BB BLOOD EXPIRATION DATE: NORMAL
BH BB BLOOD TYPE BARCODE: 8400
BH BB DISPENSE STATUS: NORMAL
BH BB PRODUCT CODE: NORMAL
BH BB UNIT NUMBER: NORMAL
CROSSMATCH INTERPRETATION: NORMAL
UNIT  ABO: NORMAL
UNIT  RH: NORMAL

## 2020-03-30 ENCOUNTER — APPOINTMENT (OUTPATIENT)
Dept: INFUSION THERAPY | Facility: HOSPITAL | Age: 75
End: 2020-03-30

## 2020-03-31 ENCOUNTER — HOSPITAL ENCOUNTER (OUTPATIENT)
Dept: INFUSION THERAPY | Facility: HOSPITAL | Age: 75
Setting detail: INFUSION SERIES
Discharge: HOME | End: 2020-03-31

## 2020-03-31 VITALS
OXYGEN SATURATION: 99 % | SYSTOLIC BLOOD PRESSURE: 148 MMHG | DIASTOLIC BLOOD PRESSURE: 60 MMHG | RESPIRATION RATE: 18 BRPM | TEMPERATURE: 97.5 F | HEART RATE: 61 BPM

## 2020-03-31 DIAGNOSIS — N18.4 ACUTE RENAL FAILURE SUPERIMPOSED ON STAGE 4 CHRONIC KIDNEY DISEASE, UNSPECIFIED ACUTE RENAL FAILURE TYPE (HCC): Primary | ICD-10-CM

## 2020-03-31 DIAGNOSIS — N17.9 ACUTE RENAL FAILURE SUPERIMPOSED ON STAGE 4 CHRONIC KIDNEY DISEASE, UNSPECIFIED ACUTE RENAL FAILURE TYPE (HCC): Primary | ICD-10-CM

## 2020-03-31 PROCEDURE — 25010000002 FERRIC CARBOXYMALTOSE 750 MG/15ML SOLUTION 15 ML VIAL: Performed by: PHYSICIAN ASSISTANT

## 2020-03-31 PROCEDURE — 96365 THER/PROPH/DIAG IV INF INIT: CPT

## 2020-03-31 PROCEDURE — 25010000002 FUROSEMIDE PER 20 MG: Performed by: INTERNAL MEDICINE

## 2020-03-31 PROCEDURE — 96375 TX/PRO/DX INJ NEW DRUG ADDON: CPT

## 2020-03-31 RX ORDER — FUROSEMIDE 10 MG/ML
40 INJECTION INTRAMUSCULAR; INTRAVENOUS ONCE
Status: COMPLETED | OUTPATIENT
Start: 2020-03-31 | End: 2020-03-31

## 2020-03-31 RX ORDER — SODIUM CHLORIDE 9 MG/ML
250 INJECTION, SOLUTION INTRAVENOUS ONCE
Status: DISCONTINUED | OUTPATIENT
Start: 2020-03-31 | End: 2020-04-02 | Stop reason: HOSPADM

## 2020-03-31 RX ORDER — FUROSEMIDE 10 MG/ML
40 INJECTION INTRAMUSCULAR; INTRAVENOUS ONCE
Status: CANCELLED
Start: 2020-04-03

## 2020-03-31 RX ORDER — SODIUM CHLORIDE 9 MG/ML
250 INJECTION, SOLUTION INTRAVENOUS ONCE
Status: CANCELLED | OUTPATIENT
Start: 2020-04-03

## 2020-03-31 RX ADMIN — FUROSEMIDE 40 MG: 10 INJECTION, SOLUTION INTRAMUSCULAR; INTRAVENOUS at 13:41

## 2020-03-31 RX ADMIN — FERRIC CARBOXYMALTOSE INJECTION 750 MG: 50 INJECTION, SOLUTION INTRAVENOUS at 13:21

## 2020-04-07 ENCOUNTER — TELEPHONE (OUTPATIENT)
Dept: INFUSION THERAPY | Facility: HOSPITAL | Age: 75
End: 2020-04-07

## 2020-04-08 ENCOUNTER — HOSPITAL ENCOUNTER (OUTPATIENT)
Dept: INFUSION THERAPY | Facility: HOSPITAL | Age: 75
Discharge: HOME | End: 2020-04-08
Admitting: PHYSICIAN ASSISTANT

## 2020-04-08 VITALS
WEIGHT: 162 LBS | DIASTOLIC BLOOD PRESSURE: 48 MMHG | SYSTOLIC BLOOD PRESSURE: 122 MMHG | RESPIRATION RATE: 18 BRPM | HEART RATE: 54 BPM | BODY MASS INDEX: 27.83 KG/M2 | TEMPERATURE: 97.1 F | OXYGEN SATURATION: 100 %

## 2020-04-08 DIAGNOSIS — N18.4 CHRONIC KIDNEY DISEASE, STAGE IV (SEVERE) (HCC): ICD-10-CM

## 2020-04-08 DIAGNOSIS — D50.9 IRON DEFICIENCY ANEMIA, UNSPECIFIED IRON DEFICIENCY ANEMIA TYPE: Primary | ICD-10-CM

## 2020-04-08 LAB
DEPRECATED RDW RBC AUTO: 67.2 FL (ref 37–54)
ERYTHROCYTE [DISTWIDTH] IN BLOOD BY AUTOMATED COUNT: 18.4 % (ref 12.3–15.4)
HCT VFR BLD AUTO: 27.8 % (ref 34–46.6)
HGB BLD-MCNC: 8.4 G/DL (ref 12–15.9)
MCH RBC QN AUTO: 30 PG (ref 26.6–33)
MCHC RBC AUTO-ENTMCNC: 30.2 G/DL (ref 31.5–35.7)
MCV RBC AUTO: 99.3 FL (ref 79–97)
PLATELET # BLD AUTO: 245 10*3/MM3 (ref 140–450)
PMV BLD AUTO: 10.2 FL (ref 6–12)
RBC # BLD AUTO: 2.8 10*6/MM3 (ref 3.77–5.28)
WBC NRBC COR # BLD: 4.46 10*3/MM3 (ref 3.4–10.8)

## 2020-04-08 PROCEDURE — 85027 COMPLETE CBC AUTOMATED: CPT | Performed by: INTERNAL MEDICINE

## 2020-04-08 PROCEDURE — 25010000002 EPOETIN ALFA PER 1000 UNITS: Performed by: PHYSICIAN ASSISTANT

## 2020-04-08 PROCEDURE — 96372 THER/PROPH/DIAG INJ SC/IM: CPT

## 2020-04-08 PROCEDURE — 36415 COLL VENOUS BLD VENIPUNCTURE: CPT

## 2020-04-08 RX ADMIN — ERYTHROPOIETIN 40000 UNITS: 40000 INJECTION, SOLUTION INTRAVENOUS; SUBCUTANEOUS at 13:22

## 2020-05-07 NOTE — ADDENDUM NOTE
Encounter addended by: Nadege Contreras RN on: 5/7/2020 10:44 AM   Actions taken: Order Reconciliation Section accessed, Home Medications modified, Medication List reviewed, Medication taking status modified

## 2020-06-01 NOTE — TELEPHONE ENCOUNTER
PT'S  CALLED TO CONFIRM 6/5/20 APPT.  HE SAID SHE IS AT [facility] ON LOCK DOWN.   PLEASE GIVE HER A CALL [phone] FOR HER TELEPHONE APPT WITH BRENDA DUMONT.     NIMA   [phone]

## 2020-06-02 NOTE — TELEPHONE ENCOUNTER
Left message with pt advising her to call [facility] to do COVID 19 screening. Also, called Satish Briones to make sure pt had transport to appt tomorrow.

## 2020-06-05 NOTE — PROGRESS NOTES
You have chosen to receive care through a telephone visit. Do you consent to use a telephone visit for your medical care today? Yes      PROBLEM LIST:  Oncology/Hematology History    1. History of left breast ductal carcinoma in situ, noncomedo with a [place]  Prognostic Index of 4. Status post excisional biopsy 02/08/1999 with a 1.5 cm  maximum dimension. She received 5 years of adjuvant treatment with tamoxifen  ending March 2004. She also received radiation therapy to the left breast  ending April 1999.   2. History of separate right breast cancer diagnosed May 2010. Stage I,  [id number] invasive moderately differentiated ductal carcinoma, [place] 7  out of 9, with no perineural or lymphvascular invasion with intermediate grade  ductal carcinoma in situ with necrosis and calcifications on biopsy 04/26/2010.  This was in the 12 o'clock position. ER/MN positive, HER-2/elvis 2+ by  immunohistochemistry, negative by FISH, 1.5 cm primary, 0 out of 2 node  negative.  a) Oncotype score 32 showing her to have a 22% distance recurrence risk on  hormonal therapy alone.  b) Status post adjuvant Taxotere and Cytoxan x4.  c) Began Arimidex May 2010.  d) Status post bilateral mastectomy followed by reconstruction.  3. History of left upper lobe small pulmonary nodules stable on serial scans,  most recent scan 05/13/2011 the nodules had become definitely calcified and  consistent with granulomas and no further scanning planned.  4. History of atrial fibrillation with rapid ventricular response under the  care of Dr. Campbell.        Malignant neoplasm of upper-outer quadrant of right breast in female, estrogen receptor positive (CMS/HCC)    5/25/2010 Initial Diagnosis     Breast cancer         REASON FOR VISIT: History of breast cancer    HISTORY OF PRESENT ILLNESS:   74 y.o.  female presents today for one year follow-up of her stage I breast cancer.She is been off Arimidex for over 5 years now.  She says she is doing fairly  well. She is in assisted living and they are on lock down at this time. She denies headaches, fevers or night sweats.       Past medical history, social history and family history was reviewed and unchanged from prior visit.    Review of Systems:    Review of Systems - Oncology   A comprehensive 14 point review of systems was performed and was negative except as mentioned.      Medications:  The current medication list was reviewed in the EMR    ALLERGIES:    Allergies   Allergen Reactions   • Cephalosporins Anaphylaxis   • Quinolones Anaphylaxis   • Ciprofloxacin    • Keflex [Cephalexin] Hives         Physical Exam    VITAL SIGNS:  There were no vitals taken for this visit.    Wt Readings from Last 3 Encounters:   04/08/20 73.5 kg (162 lb)   03/11/20 73.5 kg (162 lb)   02/12/20 73.5 kg (162 lb)        Performance Status: 1    Alert: Person, place  Psych: Mood is stable        RECENT LABS:    Lab Results   Component Value Date    HGB 10.3 (L) 06/03/2020    HCT 31.0 (L) 06/03/2020    MCV 92.8 06/03/2020     06/03/2020    WBC 6.35 06/03/2020    NEUTROABS 3.73 05/19/2020    LYMPHSABS 1.24 05/19/2020    MONOSABS 0.33 05/19/2020    EOSABS 0.07 05/19/2020    BASOSABS 0.03 05/19/2020       Lab Results   Component Value Date    GLUCOSE 111 (H) 06/03/2020    BUN 33 (H) 06/03/2020    CREATININE 2.25 (H) 06/03/2020     (L) 06/03/2020    K 4.7 06/03/2020    CL 95 (L) 06/03/2020    CO2 24.7 06/03/2020    CALCIUM 9.4 06/03/2020    PROTEINTOT 6.8 05/19/2020    ALBUMIN 3.90 06/03/2020    BILITOT 0.3 05/19/2020    ALKPHOS 98 05/19/2020    AST 26 05/19/2020    ALT 23 05/19/2020           Assessment/Plan    1. Stage I breast cancer: Patient is now over 5 years out from completion of adjuvant hormone therapy. She is doing fairly well.  She had bilateral mastectomies so she does not need any imaging. I discussed with the patient and director of assisted living that at this point she could follow up with her PCP for check  ups. Patient and staff is agreeable and will arrange for an in person visit for yearly exam in the coming months.           This visit has been rescheduled as a phone visit to comply with patient safety concerns in accordance with CDC recommendations. Total time of discussion was 10 minutes.        CUONG Rubio  [facility] Hematology and Oncology    6/5/2020

## 2020-07-02 NOTE — ED PROVIDER NOTES
Subjective   This patient has a history of Alzheimer's dementia, anemia, breast cancer, CHF, hyperlipidemia, hypertension.  She states last night she got up the middle night to go to the bathroom and tripped over her own feet and fell landing on her buttocks and striking her left lower ribs and left upper buttock on a vanity.  She states she got up went back to bed thought nothing of it went back to sleep.  She states she had an appointment scheduled at the infusion center here for her iron deficiency anemia and was found not to need the infusion but did mention that she fell and the individuals in the infusion clinic examined her left side and told her to come to the ER for evaluation of ecchymosis.  Patient is on Eliquis.  She complains of tenderness to the left lower posterior ribs with some ecchymosis and swelling, some ecchymosis to her left low back and tenderness to the left buttock.  She is ambulatory has no desire to be here and wishes to be discharged home.  She is here with a caregiver.  Did offer plain films of the ribs hip and pelvis and patient agrees.  She denies hitting her head or LOC.  She is awake and alert and pleasant at this time.  She has no abdominal pain or tenderness.  No hematuria.          Review of Systems   Constitutional: Negative.    HENT: Negative.    Eyes: Negative.    Respiratory: Negative.  Negative for shortness of breath.    Cardiovascular: Negative.  Negative for chest pain.   Gastrointestinal: Negative.  Negative for abdominal pain.   Genitourinary: Negative.  Negative for difficulty urinating and hematuria.   Musculoskeletal: Negative.         Ecchymosis and soft tissue swelling to the left posterior lower ribs, ecchymosis to the left low back, tenderness to the iliac wing on the left.   Skin: Negative.    Neurological: Negative.    Psychiatric/Behavioral: Negative.        Past Medical History:   Diagnosis Date   • Alzheimer's dementia (CMS/[facility]) 04/07/2019   • Anemia    •  Arthritis    • Breast cancer (CMS/HCC)     naveed mastectomy   • Cancer (CMS/HCC)     breast   • Congestive heart failure (CMS/HCC)    • Diabetes mellitus (CMS/HCC)     DIET CONTROLLED   • Disease of thyroid gland    • Hearing loss    • Heart disease    • Hyperlipidemia    • Hypertension    • Kidney disease    • Sleep apnea        Allergies   Allergen Reactions   • Cephalosporins Anaphylaxis   • Quinolones Anaphylaxis   • Ciprofloxacin    • Keflex [Cephalexin] Hives       Past Surgical History:   Procedure Laterality Date   • BACK SURGERY     • BREAST BIOPSY     • BREAST EXCISIONAL BIOPSY Left 1999   • BREAST RECONSTRUCTION Bilateral    • CAROTID ARTERY ANGIOPLASTY Right     ([facility])   • CATARACT EXTRACTION Bilateral    • COLONOSCOPY N/A 2017    Procedure: COLONOSCOPY;  Surgeon: Sindy Clement MD;  Location: [facility] ENDOSCOPY;  Service:    • HYSTERECTOMY     • MASTECTOMY Bilateral        Family History   Problem Relation Age of Onset   • Cancer Mother         breast   • Diabetes Mother    • Heart disease Father    • Stroke Father    • Hypertension Father    • Breast cancer Maternal Grandmother    • Breast cancer Paternal Grandmother        Social History     Socioeconomic History   • Marital status: Single     Spouse name: Not on file   • Number of children: Not on file   • Years of education: Not on file   • Highest education level: Not on file   Tobacco Use   • Smoking status: Former Smoker     Last attempt to quit: 2003     Years since quittin.0   • Smokeless tobacco: Never Used   Substance and Sexual Activity   • Alcohol use: No     Frequency: Never   • Drug use: No   • Sexual activity: Not Currently           Objective   Physical Exam   Constitutional: She appears well-developed and well-nourished.   HENT:   Head: Normocephalic and atraumatic.   Eyes: EOM are normal.   Neck: Normal range of motion.   Cardiovascular: Normal rate and regular rhythm.    Pulmonary/Chest: Effort normal and breath sounds normal.   Abdominal: Soft. There is no tenderness.   No abdominal wall bruising   Musculoskeletal:   Tenderness to palpation over the left lower posterior ribs with some mild soft tissue swelling and mild ecchymosis.  No crepitus.    Ecchymosis to left low back in a linear line horizontally approximately 8 inches long by an inch and a half tall.    Tenderness to the left gluteus dora.   Neurological: She is alert.   Skin: Skin is warm and dry.   Psychiatric: She has a normal mood and affect. Her behavior is normal.   Nursing note and vitals reviewed.      Procedures           ED Course                                           MDM    Final diagnoses:   Closed fracture of one rib of left side, initial encounter            Zach Casey PA-C  07/02/20 8104

## 2020-07-02 NOTE — CODE DOCUMENTATION
Called dr. Escalante;s office about [facility] and bp of 174/70. Per chaim segrua, patient is strongly urged to go to E.R. And for patient to get lasix 40mg PO. Patient verbalized understanding and stated she would go to e.r. To be checked out after visit with [facility].

## 2020-07-08 NOTE — ED NOTES
Patient up to bedside commode during triage. Assisted back to bed, repositioned and assisted into gown, given blankets. No further needs.      Kristi Burkett RN  07/08/20 9003

## 2020-07-08 NOTE — ED PROVIDER NOTES
Subjective   History of Present Illness  This patient has a history of Alzheimer's dementia, anemia, breast cancer, CHF, hyperlipidemia, hypertension who was sent here today from [facility] for complaint of tremors and weakness.  She does not recall how long she has had these tremors but she says for quite some time.  She reports just feeling weak.  She denies any other complaints.  Review of Systems   HENT: Negative.    Eyes: Negative.    Respiratory: Negative.    Cardiovascular: Negative.    Gastrointestinal: Negative.    Endocrine: Negative.    Genitourinary: Negative.    Musculoskeletal: Negative.    Skin: Negative.    Allergic/Immunologic: Negative.    Neurological: Positive for tremors and weakness.   Hematological: Negative.    Psychiatric/Behavioral: Positive for confusion.       Past Medical History:   Diagnosis Date   • Alzheimer's dementia (CMS/HCC) 04/07/2019   • Anemia    • Arthritis    • Breast cancer (CMS/HCC)     naveed mastectomy   • Cancer (CMS/HCC)     breast   • Congestive heart failure (CMS/HCC)    • Diabetes mellitus (CMS/HCC)     DIET CONTROLLED   • Disease of thyroid gland    • Hearing loss    • Heart disease    • Hyperlipidemia    • Hypertension    • Kidney disease    • Sleep apnea        Allergies   Allergen Reactions   • Cephalosporins Anaphylaxis   • Quinolones Anaphylaxis   • Ciprofloxacin    • Keflex [Cephalexin] Hives       Past Surgical History:   Procedure Laterality Date   • BACK SURGERY  2015   • BREAST BIOPSY  1998   • BREAST EXCISIONAL BIOPSY Left 02/08/1999   • BREAST RECONSTRUCTION Bilateral    • CAROTID ARTERY ANGIOPLASTY Right 2008    ([facility])   • CATARACT EXTRACTION Bilateral    • COLONOSCOPY N/A 8/17/2017    Procedure: COLONOSCOPY;  Surgeon: Sindy Clement MD;  Location: [facility] ENDOSCOPY;  Service:    • HYSTERECTOMY     • MASTECTOMY Bilateral 2010       Family History   Problem Relation Age of Onset   • Cancer Mother         breast   •  Diabetes Mother    • Heart disease Father    • Stroke Father    • Hypertension Father    • Breast cancer Maternal Grandmother    • Breast cancer Paternal Grandmother        Social History     Socioeconomic History   • Marital status: Single     Spouse name: Not on file   • Number of children: Not on file   • Years of education: Not on file   • Highest education level: Not on file   Tobacco Use   • Smoking status: Former Smoker     Last attempt to quit: 2003     Years since quittin.0   • Smokeless tobacco: Never Used   Substance and Sexual Activity   • Alcohol use: No     Frequency: Never   • Drug use: No   • Sexual activity: Not Currently           Objective   Physical Exam   Constitutional: She appears well-developed and well-nourished.   Nursing note and vitals reviewed.  GEN: No acute distress  Head: Normocephalic, atraumatic  Eyes: Pupils equal round reactive to light  ENT: Posterior pharynx normal in appearance, oral mucosa is moist  Chest: Nontender to palpation  Cardiovascular: Regular rate  Lungs: Clear to auscultation bilaterally  Abdomen: Soft, nontender, nondistended, no peritoneal signs  Extremities: No edema, normal appearance  Neuro: GCS 15  Psych: Mood and affect are appropriate      Procedures           ED Course  ED Course as of    1450 EKG interpreted by me reveals sinus rhythm rate 61 first-degree block.  No ectopy.  No ischemic changes.    [PF]   1608 I discussed the findings with the patient.  Have advised her to follow-up with her primary care provider for further work-up.  Have given her strict return to care instructions and she is agreeable to this plan of care    [TW]      ED Course User Index  [PF] Barrera Dill W,   [TW] Erika Rose, APRN                                           MDM  Number of Diagnoses or Management Options  Diagnosis management comments: She is a very poor historian we will do basic labs and a CT scan of her head  today.       Amount and/or Complexity of Data Reviewed  Clinical lab tests: ordered and reviewed  Tests in the radiology section of CPT®: ordered and reviewed  Review and summarize past medical records: yes  Discuss the patient with other providers: yes  Independent visualization of images, tracings, or specimens: yes    Risk of Complications, Morbidity, and/or Mortality  Presenting problems: moderate  Diagnostic procedures: moderate  Management options: moderate        Final diagnoses:   Tremors of nervous system            Erika Rose, APRN  07/08/20 1606

## 2020-07-08 NOTE — ED NOTES
[facility] Briones called at this time for transportation back. Stated they would send someone over.      Kristi Burkett RN  07/08/20 6172

## 2020-09-02 NOTE — CODE DOCUMENTATION
Called dr. cabral office about blood pressure of 230/80. Per ellie martini, patient is to have 40mg lasix po and then get Injectafer infusion. Patient is to have vital signs checked again once infusion is over.

## 2020-09-02 NOTE — CODE DOCUMENTATION
Patient refused to go to ER. Power of  contacted and per ginger arguelles and patient, patient wants to be discharged home. Patient educated on signs and symptoms to report if any were to arise. Written education on medication and discharge instructions given to patient and [employer] tomasor . ginger Arguelles was instructed about risks of going home and benefits of being seen in the ER. GINGER Arguelles verbalized understanding. Dr. cabral office notified of patients refusal

## 2020-09-09 NOTE — CODE DOCUMENTATION
Called dr. nixon's office about blood pressure of 206/114. Per ellie martini, patient needs to go to ER. Per ellie martini, if patient is to refuse to go to ER, patient is to have 40mg of lasix IV and to continue with iron infusion today. Patient educated on the benefits of going to ER . Patient verbalized understanding and refused to go to ER.

## 2020-09-09 NOTE — CODE DOCUMENTATION
Patient urinated x2 and finished iron infusion. Blood pressure was 238/78. Called dr. nixon's office, per ellie martini, instruct patient to go to ER. Patient educated about benefits of going to ER, monitoring blood pressure and taking medications. Patient verbalized understanding and refused to go to ER.

## 2020-11-12 PROBLEM — J18.9 PNEUMONIA OF LEFT LUNG DUE TO INFECTIOUS ORGANISM: Status: ACTIVE | Noted: 2020-01-01

## 2020-12-18 NOTE — TELEPHONE ENCOUNTER
JOHN NEW MEDICATION REQUEST FOR MORPHINE SULFATE 20MG/ML.      DIRECTIONS: GIVE 0.25 ML PO Q 3 HRS PRN.

## 2021-01-01 ENCOUNTER — APPOINTMENT (OUTPATIENT)
Dept: GENERAL RADIOLOGY | Facility: HOSPITAL | Age: 76
End: 2021-01-01

## 2021-01-01 ENCOUNTER — NURSING HOME (OUTPATIENT)
Dept: INTERNAL MEDICINE | Facility: CLINIC | Age: 76
End: 2021-01-01

## 2021-01-01 ENCOUNTER — APPOINTMENT (OUTPATIENT)
Dept: CARDIOLOGY | Facility: HOSPITAL | Age: 76
End: 2021-01-01

## 2021-01-01 ENCOUNTER — LAB REQUISITION (OUTPATIENT)
Dept: LAB | Facility: HOSPITAL | Age: 76
End: 2021-01-01

## 2021-01-01 ENCOUNTER — HOSPITAL ENCOUNTER (INPATIENT)
Facility: HOSPITAL | Age: 76
LOS: 7 days | Discharge: HOSPICE | End: 2021-05-03
Attending: EMERGENCY MEDICINE | Admitting: INTERNAL MEDICINE

## 2021-01-01 ENCOUNTER — HOSPITAL ENCOUNTER (INPATIENT)
Facility: HOSPITAL | Age: 76
LOS: 2 days | Discharge: NURSING, REHAB OR LONG-TERM CARE | End: 2021-01-19
Attending: EMERGENCY MEDICINE | Admitting: FAMILY MEDICINE

## 2021-01-01 ENCOUNTER — CONSULT (OUTPATIENT)
Dept: CARDIOLOGY | Facility: CLINIC | Age: 76
End: 2021-01-01

## 2021-01-01 VITALS
WEIGHT: 134 LBS | SYSTOLIC BLOOD PRESSURE: 127 MMHG | BODY MASS INDEX: 21.63 KG/M2 | DIASTOLIC BLOOD PRESSURE: 73 MMHG | OXYGEN SATURATION: 96 % | HEART RATE: 79 BPM | TEMPERATURE: 97.8 F | RESPIRATION RATE: 20 BRPM

## 2021-01-01 VITALS
HEIGHT: 66 IN | OXYGEN SATURATION: 97 % | TEMPERATURE: 98.1 F | HEART RATE: 131 BPM | DIASTOLIC BLOOD PRESSURE: 80 MMHG | WEIGHT: 124.78 LBS | SYSTOLIC BLOOD PRESSURE: 114 MMHG | BODY MASS INDEX: 20.05 KG/M2 | RESPIRATION RATE: 22 BRPM

## 2021-01-01 VITALS
SYSTOLIC BLOOD PRESSURE: 118 MMHG | HEART RATE: 94 BPM | DIASTOLIC BLOOD PRESSURE: 78 MMHG | BODY MASS INDEX: 21.86 KG/M2 | HEIGHT: 66 IN | OXYGEN SATURATION: 98 % | WEIGHT: 136 LBS | RESPIRATION RATE: 18 BRPM

## 2021-01-01 VITALS
DIASTOLIC BLOOD PRESSURE: 70 MMHG | TEMPERATURE: 97.5 F | SYSTOLIC BLOOD PRESSURE: 126 MMHG | OXYGEN SATURATION: 96 % | RESPIRATION RATE: 20 BRPM | WEIGHT: 135 LBS | BODY MASS INDEX: 21.79 KG/M2 | HEART RATE: 71 BPM

## 2021-01-01 VITALS
SYSTOLIC BLOOD PRESSURE: 163 MMHG | HEIGHT: 66 IN | BODY MASS INDEX: 22 KG/M2 | WEIGHT: 136.91 LBS | TEMPERATURE: 97.7 F | RESPIRATION RATE: 18 BRPM | DIASTOLIC BLOOD PRESSURE: 50 MMHG | OXYGEN SATURATION: 100 % | HEART RATE: 63 BPM

## 2021-01-01 DIAGNOSIS — I50.32 CHRONIC DIASTOLIC HEART FAILURE (HCC): ICD-10-CM

## 2021-01-01 DIAGNOSIS — Z74.09 IMPAIRED MOBILITY: ICD-10-CM

## 2021-01-01 DIAGNOSIS — N18.30 STAGE 3 CHRONIC KIDNEY DISEASE, UNSPECIFIED WHETHER STAGE 3A OR 3B CKD (HCC): ICD-10-CM

## 2021-01-01 DIAGNOSIS — G30.1 DEMENTIA OF THE ALZHEIMER'S TYPE WITH LATE ONSET WITHOUT BEHAVIORAL DISTURBANCE (HCC): ICD-10-CM

## 2021-01-01 DIAGNOSIS — I73.9 PAD (PERIPHERAL ARTERY DISEASE) (HCC): ICD-10-CM

## 2021-01-01 DIAGNOSIS — F02.80 DEMENTIA OF THE ALZHEIMER'S TYPE WITH LATE ONSET WITHOUT BEHAVIORAL DISTURBANCE (HCC): ICD-10-CM

## 2021-01-01 DIAGNOSIS — R53.1 GENERALIZED WEAKNESS: Primary | ICD-10-CM

## 2021-01-01 DIAGNOSIS — T14.8XXA DEEP TISSUE INJURY: ICD-10-CM

## 2021-01-01 DIAGNOSIS — U07.1 COVID-19 VIRUS INFECTION: Primary | ICD-10-CM

## 2021-01-01 DIAGNOSIS — R41.0 DISORIENTATION: ICD-10-CM

## 2021-01-01 DIAGNOSIS — I10 ESSENTIAL HYPERTENSION: ICD-10-CM

## 2021-01-01 DIAGNOSIS — I48.0 PAROXYSMAL ATRIAL FIBRILLATION (HCC): ICD-10-CM

## 2021-01-01 DIAGNOSIS — G62.9 PERIPHERAL POLYNEUROPATHY: ICD-10-CM

## 2021-01-01 DIAGNOSIS — I73.9 PAD (PERIPHERAL ARTERY DISEASE) (HCC): Primary | ICD-10-CM

## 2021-01-01 DIAGNOSIS — R77.8 ELEVATED TROPONIN: ICD-10-CM

## 2021-01-01 DIAGNOSIS — N18.4 CHRONIC KIDNEY DISEASE, STAGE 4 (SEVERE) (HCC): ICD-10-CM

## 2021-01-01 DIAGNOSIS — N18.4 CHRONIC KIDNEY DISEASE, STAGE IV (SEVERE) (HCC): ICD-10-CM

## 2021-01-01 DIAGNOSIS — R55 SYNCOPE, UNSPECIFIED SYNCOPE TYPE: Primary | ICD-10-CM

## 2021-01-01 DIAGNOSIS — J18.9 PNEUMONIA OF BOTH LUNGS DUE TO INFECTIOUS ORGANISM, UNSPECIFIED PART OF LUNG: Primary | ICD-10-CM

## 2021-01-01 DIAGNOSIS — U07.1 COVID-19: ICD-10-CM

## 2021-01-01 DIAGNOSIS — U07.1 COVID-19 VIRUS INFECTION: ICD-10-CM

## 2021-01-01 DIAGNOSIS — I50.30 DIASTOLIC CONGESTIVE HEART FAILURE, UNSPECIFIED HF CHRONICITY (HCC): ICD-10-CM

## 2021-01-01 DIAGNOSIS — F02.80 DEMENTIA OF THE ALZHEIMER'S TYPE WITH LATE ONSET WITHOUT BEHAVIORAL DISTURBANCE (HCC): Primary | ICD-10-CM

## 2021-01-01 DIAGNOSIS — G30.1 DEMENTIA OF THE ALZHEIMER'S TYPE WITH LATE ONSET WITHOUT BEHAVIORAL DISTURBANCE (HCC): Primary | ICD-10-CM

## 2021-01-01 LAB
ALBUMIN SERPL-MCNC: 2.5 G/DL (ref 3.5–5.2)
ALBUMIN SERPL-MCNC: 2.5 G/DL (ref 3.5–5.2)
ALBUMIN SERPL-MCNC: 2.6 G/DL (ref 3.5–5.2)
ALBUMIN SERPL-MCNC: 2.6 G/DL (ref 3.5–5.2)
ALBUMIN SERPL-MCNC: 2.7 G/DL (ref 3.5–5.2)
ALBUMIN SERPL-MCNC: 2.7 G/DL (ref 3.5–5.2)
ALBUMIN SERPL-MCNC: 3 G/DL (ref 3.5–5.2)
ALBUMIN SERPL-MCNC: 3.1 G/DL (ref 3.5–5.2)
ALBUMIN SERPL-MCNC: 3.1 G/DL (ref 3.5–5.2)
ALBUMIN/GLOB SERPL: 1 G/DL
ALBUMIN/GLOB SERPL: 1.3 G/DL
ALBUMIN/GLOB SERPL: 1.3 G/DL
ALP SERPL-CCNC: 65 U/L (ref 39–117)
ALP SERPL-CCNC: 77 U/L (ref 39–117)
ALP SERPL-CCNC: 88 U/L (ref 39–117)
ALT SERPL W P-5'-P-CCNC: 17 U/L (ref 1–33)
ALT SERPL W P-5'-P-CCNC: 31 U/L (ref 1–33)
ALT SERPL W P-5'-P-CCNC: 33 U/L (ref 1–33)
ANION GAP SERPL CALCULATED.3IONS-SCNC: 10 MMOL/L (ref 5–15)
ANION GAP SERPL CALCULATED.3IONS-SCNC: 11.2 MMOL/L (ref 5–15)
ANION GAP SERPL CALCULATED.3IONS-SCNC: 11.6 MMOL/L (ref 5–15)
ANION GAP SERPL CALCULATED.3IONS-SCNC: 11.6 MMOL/L (ref 5–15)
ANION GAP SERPL CALCULATED.3IONS-SCNC: 11.7 MMOL/L (ref 5–15)
ANION GAP SERPL CALCULATED.3IONS-SCNC: 11.8 MMOL/L (ref 5–15)
ANION GAP SERPL CALCULATED.3IONS-SCNC: 12.3 MMOL/L (ref 5–15)
ANION GAP SERPL CALCULATED.3IONS-SCNC: 12.5 MMOL/L (ref 5–15)
ANION GAP SERPL CALCULATED.3IONS-SCNC: 12.9 MMOL/L (ref 5–15)
ANION GAP SERPL CALCULATED.3IONS-SCNC: 13.5 MMOL/L (ref 5–15)
ANION GAP SERPL CALCULATED.3IONS-SCNC: 9.7 MMOL/L (ref 5–15)
ANISOCYTOSIS BLD QL: NORMAL
AST SERPL-CCNC: 23 U/L (ref 1–32)
AST SERPL-CCNC: 25 U/L (ref 1–32)
AST SERPL-CCNC: 39 U/L (ref 1–32)
B PARAPERT DNA SPEC QL NAA+PROBE: NOT DETECTED
B PERT DNA SPEC QL NAA+PROBE: NOT DETECTED
BACTERIA SPEC AEROBE CULT: NO GROWTH
BACTERIA SPEC AEROBE CULT: NORMAL
BACTERIA SPEC AEROBE CULT: NORMAL
BACTERIA UR QL AUTO: ABNORMAL /HPF
BASOPHILS # BLD AUTO: 0 10*3/MM3 (ref 0–0.2)
BASOPHILS # BLD AUTO: 0 10*3/MM3 (ref 0–0.2)
BASOPHILS # BLD AUTO: 0.03 10*3/MM3 (ref 0–0.2)
BASOPHILS # BLD AUTO: 0.03 10*3/MM3 (ref 0–0.2)
BASOPHILS # BLD AUTO: 0.04 10*3/MM3 (ref 0–0.2)
BASOPHILS # BLD AUTO: 0.05 10*3/MM3 (ref 0–0.2)
BASOPHILS # BLD AUTO: 0.05 10*3/MM3 (ref 0–0.2)
BASOPHILS NFR BLD AUTO: 0 % (ref 0–1.5)
BASOPHILS NFR BLD AUTO: 0 % (ref 0–1.5)
BASOPHILS NFR BLD AUTO: 0.3 % (ref 0–1.5)
BASOPHILS NFR BLD AUTO: 0.4 % (ref 0–1.5)
BASOPHILS NFR BLD AUTO: 0.5 % (ref 0–1.5)
BASOPHILS NFR BLD AUTO: 0.5 % (ref 0–1.5)
BASOPHILS NFR BLD AUTO: 0.8 % (ref 0–1.5)
BH CV ECHO MEAS - % IVS THICK: 6.3 %
BH CV ECHO MEAS - % LVPW THICK: 39.4 %
BH CV ECHO MEAS - AI DEC SLOPE: 134.5 CM/SEC^2
BH CV ECHO MEAS - AI MAX PG: 33.5 MMHG
BH CV ECHO MEAS - AI MAX VEL: 289 CM/SEC
BH CV ECHO MEAS - AI P1/2T: 629.3 MSEC
BH CV ECHO MEAS - AO MAX PG (FULL): 8.9 MMHG
BH CV ECHO MEAS - AO MAX PG: 12 MMHG
BH CV ECHO MEAS - AO MEAN PG (FULL): 5.3 MMHG
BH CV ECHO MEAS - AO MEAN PG: 6.3 MMHG
BH CV ECHO MEAS - AO ROOT AREA (BSA CORRECTED): 1.5
BH CV ECHO MEAS - AO ROOT AREA: 4.9 CM^2
BH CV ECHO MEAS - AO ROOT DIAM: 2.5 CM
BH CV ECHO MEAS - AO V2 MAX: 172.7 CM/SEC
BH CV ECHO MEAS - AO V2 MEAN: 111.3 CM/SEC
BH CV ECHO MEAS - AO V2 VTI: 27.9 CM
BH CV ECHO MEAS - AVA(I,A): 1.5 CM^2
BH CV ECHO MEAS - AVA(I,D): 1.5 CM^2
BH CV ECHO MEAS - AVA(V,A): 1.6 CM^2
BH CV ECHO MEAS - AVA(V,D): 1.6 CM^2
BH CV ECHO MEAS - BSA(HAYCOCK): 1.6 M^2
BH CV ECHO MEAS - BSA: 1.6 M^2
BH CV ECHO MEAS - BZI_BMI: 20 KILOGRAMS/M^2
BH CV ECHO MEAS - BZI_METRIC_HEIGHT: 167.6 CM
BH CV ECHO MEAS - BZI_METRIC_WEIGHT: 56.2 KG
BH CV ECHO MEAS - EDV(CUBED): 59.8 ML
BH CV ECHO MEAS - EDV(MOD-SP2): 101 ML
BH CV ECHO MEAS - EDV(MOD-SP4): 110 ML
BH CV ECHO MEAS - EDV(TEICH): 66.3 ML
BH CV ECHO MEAS - EF(CUBED): 42 %
BH CV ECHO MEAS - EF(MOD-BP): 58.4 %
BH CV ECHO MEAS - EF(MOD-SP2): 59.6 %
BH CV ECHO MEAS - EF(MOD-SP4): 59.2 %
BH CV ECHO MEAS - EF(TEICH): 35.4 %
BH CV ECHO MEAS - ESV(CUBED): 34.6 ML
BH CV ECHO MEAS - ESV(MOD-SP2): 40.8 ML
BH CV ECHO MEAS - ESV(MOD-SP4): 44.9 ML
BH CV ECHO MEAS - ESV(TEICH): 42.8 ML
BH CV ECHO MEAS - FS: 16.6 %
BH CV ECHO MEAS - IVS/LVPW: 1
BH CV ECHO MEAS - IVSD: 1.4 CM
BH CV ECHO MEAS - IVSS: 1.5 CM
BH CV ECHO MEAS - LA DIMENSION: 4.7 CM
BH CV ECHO MEAS - LA/AO: 1.9
BH CV ECHO MEAS - LAD MAJOR: 7.8 CM
BH CV ECHO MEAS - LAT PEAK E' VEL: 9.9 CM/SEC
BH CV ECHO MEAS - LATERAL E/E' RATIO: 12.6
BH CV ECHO MEAS - LV DIASTOLIC VOL/BSA (35-75): 67.4 ML/M^2
BH CV ECHO MEAS - LV MASS(C)D: 207.9 GRAMS
BH CV ECHO MEAS - LV MASS(C)DI: 127.3 GRAMS/M^2
BH CV ECHO MEAS - LV MASS(C)S: 228.8 GRAMS
BH CV ECHO MEAS - LV MASS(C)SI: 140.2 GRAMS/M^2
BH CV ECHO MEAS - LV MAX PG: 3.1 MMHG
BH CV ECHO MEAS - LV MEAN PG: 1 MMHG
BH CV ECHO MEAS - LV SYSTOLIC VOL/BSA (12-30): 27.5 ML/M^2
BH CV ECHO MEAS - LV V1 MAX: 87.6 CM/SEC
BH CV ECHO MEAS - LV V1 MEAN: 55.1 CM/SEC
BH CV ECHO MEAS - LV V1 VTI: 13.9 CM
BH CV ECHO MEAS - LVIDD: 3.9 CM
BH CV ECHO MEAS - LVIDS: 3.3 CM
BH CV ECHO MEAS - LVLD AP2: 7.5 CM
BH CV ECHO MEAS - LVLD AP4: 7.3 CM
BH CV ECHO MEAS - LVLS AP2: 6.8 CM
BH CV ECHO MEAS - LVLS AP4: 6.2 CM
BH CV ECHO MEAS - LVOT AREA (M): 3.1 CM^2
BH CV ECHO MEAS - LVOT AREA: 3.1 CM^2
BH CV ECHO MEAS - LVOT DIAM: 2 CM
BH CV ECHO MEAS - LVPWD: 1.4 CM
BH CV ECHO MEAS - LVPWS: 2 CM
BH CV ECHO MEAS - MED PEAK E' VEL: 6.2 CM/SEC
BH CV ECHO MEAS - MEDIAL E/E' RATIO: 20.2
BH CV ECHO MEAS - MV DEC TIME: 0.18 SEC
BH CV ECHO MEAS - MV E MAX VEL: 125 CM/SEC
BH CV ECHO MEAS - MV MAX PG: 8.1 MMHG
BH CV ECHO MEAS - MV MEAN PG: 3 MMHG
BH CV ECHO MEAS - MV V2 MAX: 142 CM/SEC
BH CV ECHO MEAS - MV V2 MEAN: 71.8 CM/SEC
BH CV ECHO MEAS - MV V2 VTI: 19.6 CM
BH CV ECHO MEAS - MVA(VTI): 2.2 CM^2
BH CV ECHO MEAS - PA ACC TIME: 0.08 SEC
BH CV ECHO MEAS - PA MAX PG (FULL): 2 MMHG
BH CV ECHO MEAS - PA MAX PG: 6.3 MMHG
BH CV ECHO MEAS - PA MEAN PG (FULL): 0.5 MMHG
BH CV ECHO MEAS - PA MEAN PG: 3 MMHG
BH CV ECHO MEAS - PA PR(ACCEL): 42.6 MMHG
BH CV ECHO MEAS - PA V2 MAX: 125 CM/SEC
BH CV ECHO MEAS - PA V2 MEAN: 83.4 CM/SEC
BH CV ECHO MEAS - PA V2 VTI: 23.1 CM
BH CV ECHO MEAS - RAP SYSTOLE: 3 MMHG
BH CV ECHO MEAS - RV MAX PG: 4.2 MMHG
BH CV ECHO MEAS - RV MEAN PG: 2.5 MMHG
BH CV ECHO MEAS - RV V1 MAX: 102.2 CM/SEC
BH CV ECHO MEAS - RV V1 MEAN: 69.4 CM/SEC
BH CV ECHO MEAS - RV V1 VTI: 20.6 CM
BH CV ECHO MEAS - RVSP: 23 MMHG
BH CV ECHO MEAS - SI(AO): 84.5 ML/M^2
BH CV ECHO MEAS - SI(CUBED): 15.4 ML/M^2
BH CV ECHO MEAS - SI(LVOT): 26.2 ML/M^2
BH CV ECHO MEAS - SI(MOD-SP2): 36.9 ML/M^2
BH CV ECHO MEAS - SI(MOD-SP4): 39.9 ML/M^2
BH CV ECHO MEAS - SI(TEICH): 14.4 ML/M^2
BH CV ECHO MEAS - SV(AO): 137.9 ML
BH CV ECHO MEAS - SV(CUBED): 25.1 ML
BH CV ECHO MEAS - SV(LVOT): 42.8 ML
BH CV ECHO MEAS - SV(MOD-SP2): 60.2 ML
BH CV ECHO MEAS - SV(MOD-SP4): 65.1 ML
BH CV ECHO MEAS - SV(TEICH): 23.5 ML
BH CV ECHO MEAS - TAPSE (>1.6): 2.2 CM
BH CV ECHO MEAS - TR MAX PG: 20 MMHG
BH CV ECHO MEAS - TR MAX VEL: 223.7 CM/SEC
BH CV ECHO MEASUREMENTS AVERAGE E/E' RATIO: 15.53
BH CV XLRA - RV BASE: 3.5 CM
BH CV XLRA - RV LENGTH: 6.5 CM
BH CV XLRA - RV MID: 3.3 CM
BH CV XLRA - TDI S': 13.3 CM/SEC
BILIRUB SERPL-MCNC: 0.7 MG/DL (ref 0–1.2)
BILIRUB SERPL-MCNC: 0.9 MG/DL (ref 0–1.2)
BILIRUB SERPL-MCNC: 0.9 MG/DL (ref 0–1.2)
BILIRUB UR QL STRIP: NEGATIVE
BUN SERPL-MCNC: 20 MG/DL (ref 8–23)
BUN SERPL-MCNC: 24 MG/DL (ref 8–23)
BUN SERPL-MCNC: 24 MG/DL (ref 8–23)
BUN SERPL-MCNC: 35 MG/DL (ref 8–23)
BUN SERPL-MCNC: 37 MG/DL (ref 8–23)
BUN SERPL-MCNC: 38 MG/DL (ref 8–23)
BUN SERPL-MCNC: 42 MG/DL (ref 8–23)
BUN SERPL-MCNC: 43 MG/DL (ref 8–23)
BUN SERPL-MCNC: 45 MG/DL (ref 8–23)
BUN SERPL-MCNC: 45 MG/DL (ref 8–23)
BUN SERPL-MCNC: 52 MG/DL (ref 8–23)
BUN/CREAT SERPL: 12 (ref 7–25)
BUN/CREAT SERPL: 14.8 (ref 7–25)
BUN/CREAT SERPL: 17.6 (ref 7–25)
BUN/CREAT SERPL: 26.5 (ref 7–25)
BUN/CREAT SERPL: 27.4 (ref 7–25)
BUN/CREAT SERPL: 28.8 (ref 7–25)
BUN/CREAT SERPL: 28.9 (ref 7–25)
BUN/CREAT SERPL: 29.6 (ref 7–25)
BUN/CREAT SERPL: 30 (ref 7–25)
BUN/CREAT SERPL: 30.5 (ref 7–25)
BUN/CREAT SERPL: 32.1 (ref 7–25)
C PNEUM DNA NPH QL NAA+NON-PROBE: NOT DETECTED
CALCIUM SPEC-SCNC: 9.1 MG/DL (ref 8.6–10.5)
CALCIUM SPEC-SCNC: 9.2 MG/DL (ref 8.6–10.5)
CALCIUM SPEC-SCNC: 9.3 MG/DL (ref 8.6–10.5)
CALCIUM SPEC-SCNC: 9.4 MG/DL (ref 8.6–10.5)
CALCIUM SPEC-SCNC: 9.5 MG/DL (ref 8.6–10.5)
CALCIUM SPEC-SCNC: 9.6 MG/DL (ref 8.6–10.5)
CALCIUM SPEC-SCNC: 9.7 MG/DL (ref 8.6–10.5)
CALCIUM SPEC-SCNC: 9.7 MG/DL (ref 8.6–10.5)
CALCIUM SPEC-SCNC: 9.8 MG/DL (ref 8.6–10.5)
CHLORIDE SERPL-SCNC: 103 MMOL/L (ref 98–107)
CHLORIDE SERPL-SCNC: 103 MMOL/L (ref 98–107)
CHLORIDE SERPL-SCNC: 105 MMOL/L (ref 98–107)
CHLORIDE SERPL-SCNC: 105 MMOL/L (ref 98–107)
CHLORIDE SERPL-SCNC: 107 MMOL/L (ref 98–107)
CHLORIDE SERPL-SCNC: 108 MMOL/L (ref 98–107)
CHLORIDE SERPL-SCNC: 108 MMOL/L (ref 98–107)
CHLORIDE SERPL-SCNC: 113 MMOL/L (ref 98–107)
CHLORIDE SERPL-SCNC: 120 MMOL/L (ref 98–107)
CHLORIDE SERPL-SCNC: 121 MMOL/L (ref 98–107)
CHLORIDE SERPL-SCNC: 124 MMOL/L (ref 98–107)
CLARITY UR: ABNORMAL
CO2 SERPL-SCNC: 23 MMOL/L (ref 22–29)
CO2 SERPL-SCNC: 23.2 MMOL/L (ref 22–29)
CO2 SERPL-SCNC: 23.4 MMOL/L (ref 22–29)
CO2 SERPL-SCNC: 23.5 MMOL/L (ref 22–29)
CO2 SERPL-SCNC: 23.7 MMOL/L (ref 22–29)
CO2 SERPL-SCNC: 23.8 MMOL/L (ref 22–29)
CO2 SERPL-SCNC: 24.3 MMOL/L (ref 22–29)
CO2 SERPL-SCNC: 24.5 MMOL/L (ref 22–29)
CO2 SERPL-SCNC: 25.3 MMOL/L (ref 22–29)
CO2 SERPL-SCNC: 26.1 MMOL/L (ref 22–29)
CO2 SERPL-SCNC: 26.4 MMOL/L (ref 22–29)
COD CRY URNS QL: ABNORMAL /HPF
COLOR UR: ABNORMAL
COLOR UR: YELLOW
COLOR UR: YELLOW
CREAT SERPL-MCNC: 1.28 MG/DL (ref 0.57–1)
CREAT SERPL-MCNC: 1.32 MG/DL (ref 0.57–1)
CREAT SERPL-MCNC: 1.32 MG/DL (ref 0.57–1)
CREAT SERPL-MCNC: 1.36 MG/DL (ref 0.57–1)
CREAT SERPL-MCNC: 1.4 MG/DL (ref 0.57–1)
CREAT SERPL-MCNC: 1.4 MG/DL (ref 0.57–1)
CREAT SERPL-MCNC: 1.41 MG/DL (ref 0.57–1)
CREAT SERPL-MCNC: 1.52 MG/DL (ref 0.57–1)
CREAT SERPL-MCNC: 1.62 MG/DL (ref 0.57–1)
CREAT SERPL-MCNC: 1.67 MG/DL (ref 0.57–1)
CREAT SERPL-MCNC: 1.9 MG/DL (ref 0.57–1)
CRP SERPL-MCNC: 20.39 MG/DL (ref 0–0.5)
D-LACTATE SERPL-SCNC: 1 MMOL/L (ref 0.5–2)
D-LACTATE SERPL-SCNC: 1.2 MMOL/L (ref 0.5–2)
D-LACTATE SERPL-SCNC: 2.2 MMOL/L (ref 0.5–2)
DEPRECATED RDW RBC AUTO: 50.3 FL (ref 37–54)
DEPRECATED RDW RBC AUTO: 50.4 FL (ref 37–54)
DEPRECATED RDW RBC AUTO: 50.4 FL (ref 37–54)
DEPRECATED RDW RBC AUTO: 50.5 FL (ref 37–54)
DEPRECATED RDW RBC AUTO: 51.8 FL (ref 37–54)
DEPRECATED RDW RBC AUTO: 52.5 FL (ref 37–54)
DEPRECATED RDW RBC AUTO: 53.5 FL (ref 37–54)
DEPRECATED RDW RBC AUTO: 58.2 FL (ref 37–54)
EOSINOPHIL # BLD AUTO: 0 10*3/MM3 (ref 0–0.4)
EOSINOPHIL # BLD AUTO: 0 10*3/MM3 (ref 0–0.4)
EOSINOPHIL # BLD AUTO: 0.06 10*3/MM3 (ref 0–0.4)
EOSINOPHIL # BLD AUTO: 0.07 10*3/MM3 (ref 0–0.4)
EOSINOPHIL # BLD AUTO: 0.09 10*3/MM3 (ref 0–0.4)
EOSINOPHIL # BLD AUTO: 0.25 10*3/MM3 (ref 0–0.4)
EOSINOPHIL # BLD AUTO: 0.26 10*3/MM3 (ref 0–0.4)
EOSINOPHIL NFR BLD AUTO: 0 % (ref 0.3–6.2)
EOSINOPHIL NFR BLD AUTO: 0 % (ref 0.3–6.2)
EOSINOPHIL NFR BLD AUTO: 0.6 % (ref 0.3–6.2)
EOSINOPHIL NFR BLD AUTO: 0.9 % (ref 0.3–6.2)
EOSINOPHIL NFR BLD AUTO: 1.5 % (ref 0.3–6.2)
EOSINOPHIL NFR BLD AUTO: 2.5 % (ref 0.3–6.2)
EOSINOPHIL NFR BLD AUTO: 2.7 % (ref 0.3–6.2)
ERYTHROCYTE [DISTWIDTH] IN BLOOD BY AUTOMATED COUNT: 14.2 % (ref 12.3–15.4)
ERYTHROCYTE [DISTWIDTH] IN BLOOD BY AUTOMATED COUNT: 14.2 % (ref 12.3–15.4)
ERYTHROCYTE [DISTWIDTH] IN BLOOD BY AUTOMATED COUNT: 14.3 % (ref 12.3–15.4)
ERYTHROCYTE [DISTWIDTH] IN BLOOD BY AUTOMATED COUNT: 14.4 % (ref 12.3–15.4)
ERYTHROCYTE [DISTWIDTH] IN BLOOD BY AUTOMATED COUNT: 14.4 % (ref 12.3–15.4)
ERYTHROCYTE [DISTWIDTH] IN BLOOD BY AUTOMATED COUNT: 14.5 % (ref 12.3–15.4)
ERYTHROCYTE [DISTWIDTH] IN BLOOD BY AUTOMATED COUNT: 14.7 % (ref 12.3–15.4)
ERYTHROCYTE [DISTWIDTH] IN BLOOD BY AUTOMATED COUNT: 14.8 % (ref 12.3–15.4)
FLUAV SUBTYP SPEC NAA+PROBE: NOT DETECTED
FLUBV RNA ISLT QL NAA+PROBE: NOT DETECTED
FOLATE SERPL-MCNC: 16.8 NG/ML (ref 4.78–24.2)
GFR SERPL CREATININE-BSD FRML MDRD: 26 ML/MIN/1.73
GFR SERPL CREATININE-BSD FRML MDRD: 30 ML/MIN/1.73
GFR SERPL CREATININE-BSD FRML MDRD: 31 ML/MIN/1.73
GFR SERPL CREATININE-BSD FRML MDRD: 33 ML/MIN/1.73
GFR SERPL CREATININE-BSD FRML MDRD: 36 ML/MIN/1.73
GFR SERPL CREATININE-BSD FRML MDRD: 37 ML/MIN/1.73
GFR SERPL CREATININE-BSD FRML MDRD: 37 ML/MIN/1.73
GFR SERPL CREATININE-BSD FRML MDRD: 38 ML/MIN/1.73
GFR SERPL CREATININE-BSD FRML MDRD: 39 ML/MIN/1.73
GFR SERPL CREATININE-BSD FRML MDRD: 39 ML/MIN/1.73
GFR SERPL CREATININE-BSD FRML MDRD: 41 ML/MIN/1.73
GLOBULIN UR ELPH-MCNC: 2.4 GM/DL
GLOBULIN UR ELPH-MCNC: 2.4 GM/DL
GLOBULIN UR ELPH-MCNC: 3 GM/DL
GLUCOSE SERPL-MCNC: 103 MG/DL (ref 65–99)
GLUCOSE SERPL-MCNC: 109 MG/DL (ref 65–99)
GLUCOSE SERPL-MCNC: 111 MG/DL (ref 65–99)
GLUCOSE SERPL-MCNC: 123 MG/DL (ref 65–99)
GLUCOSE SERPL-MCNC: 132 MG/DL (ref 65–99)
GLUCOSE SERPL-MCNC: 142 MG/DL (ref 65–99)
GLUCOSE SERPL-MCNC: 143 MG/DL (ref 65–99)
GLUCOSE SERPL-MCNC: 146 MG/DL (ref 65–99)
GLUCOSE SERPL-MCNC: 158 MG/DL (ref 65–99)
GLUCOSE SERPL-MCNC: 158 MG/DL (ref 65–99)
GLUCOSE SERPL-MCNC: 164 MG/DL (ref 65–99)
GLUCOSE UR STRIP-MCNC: NEGATIVE MG/DL
HADV DNA SPEC NAA+PROBE: NOT DETECTED
HBA1C MFR BLD: 4.9 % (ref 4.8–5.6)
HCOV 229E RNA SPEC QL NAA+PROBE: NOT DETECTED
HCOV HKU1 RNA SPEC QL NAA+PROBE: NOT DETECTED
HCOV NL63 RNA SPEC QL NAA+PROBE: NOT DETECTED
HCOV OC43 RNA SPEC QL NAA+PROBE: NOT DETECTED
HCT VFR BLD AUTO: 18.9 % (ref 34–46.6)
HCT VFR BLD AUTO: 23.4 % (ref 34–46.6)
HCT VFR BLD AUTO: 24.3 % (ref 34–46.6)
HCT VFR BLD AUTO: 27.4 % (ref 34–46.6)
HCT VFR BLD AUTO: 27.5 % (ref 34–46.6)
HCT VFR BLD AUTO: 27.7 % (ref 34–46.6)
HCT VFR BLD AUTO: 31 % (ref 34–46.6)
HCT VFR BLD AUTO: 34.4 % (ref 34–46.6)
HCT VFR BLD AUTO: 41.3 % (ref 34–46.6)
HEMOCCULT STL QL: NEGATIVE
HGB BLD-MCNC: 10.1 G/DL (ref 12–15.9)
HGB BLD-MCNC: 11.4 G/DL (ref 12–15.9)
HGB BLD-MCNC: 12.7 G/DL (ref 12–15.9)
HGB BLD-MCNC: 6.1 G/DL (ref 12–15.9)
HGB BLD-MCNC: 7.6 G/DL (ref 12–15.9)
HGB BLD-MCNC: 8 G/DL (ref 12–15.9)
HGB BLD-MCNC: 8.6 G/DL (ref 12–15.9)
HGB BLD-MCNC: 8.8 G/DL (ref 12–15.9)
HGB BLD-MCNC: 9.1 G/DL (ref 12–15.9)
HGB UR QL STRIP.AUTO: ABNORMAL
HMPV RNA NPH QL NAA+NON-PROBE: NOT DETECTED
HOLD SPECIMEN: NORMAL
HPIV1 RNA SPEC QL NAA+PROBE: NOT DETECTED
HPIV2 RNA SPEC QL NAA+PROBE: NOT DETECTED
HPIV3 RNA NPH QL NAA+PROBE: NOT DETECTED
HPIV4 P GENE NPH QL NAA+PROBE: NOT DETECTED
HYALINE CASTS UR QL AUTO: ABNORMAL /LPF
IMM GRANULOCYTES # BLD AUTO: 0.02 10*3/MM3 (ref 0–0.05)
IMM GRANULOCYTES # BLD AUTO: 0.03 10*3/MM3 (ref 0–0.05)
IMM GRANULOCYTES # BLD AUTO: 0.03 10*3/MM3 (ref 0–0.05)
IMM GRANULOCYTES # BLD AUTO: 0.04 10*3/MM3 (ref 0–0.05)
IMM GRANULOCYTES # BLD AUTO: 0.04 10*3/MM3 (ref 0–0.05)
IMM GRANULOCYTES # BLD AUTO: 0.06 10*3/MM3 (ref 0–0.05)
IMM GRANULOCYTES # BLD AUTO: 0.09 10*3/MM3 (ref 0–0.05)
IMM GRANULOCYTES NFR BLD AUTO: 0.3 % (ref 0–0.5)
IMM GRANULOCYTES NFR BLD AUTO: 0.4 % (ref 0–0.5)
IMM GRANULOCYTES NFR BLD AUTO: 0.6 % (ref 0–0.5)
IMM GRANULOCYTES NFR BLD AUTO: 0.6 % (ref 0–0.5)
IMM GRANULOCYTES NFR BLD AUTO: 0.8 % (ref 0–0.5)
IMM GRANULOCYTES NFR BLD AUTO: 0.8 % (ref 0–0.5)
IMM GRANULOCYTES NFR BLD AUTO: 0.9 % (ref 0–0.5)
IRON 24H UR-MRATE: 14 MCG/DL (ref 37–145)
IRON SATN MFR SERPL: 8 % (ref 20–50)
KETONES UR QL STRIP: NEGATIVE
L PNEUMO1 AG UR QL IA: NEGATIVE
LEFT ATRIUM VOLUME INDEX: 95 ML/M^2
LEFT ATRIUM VOLUME: 155 ML
LEUKOCYTE ESTERASE UR QL STRIP.AUTO: ABNORMAL
LEUKOCYTE ESTERASE UR QL STRIP.AUTO: ABNORMAL
LEUKOCYTE ESTERASE UR QL STRIP.AUTO: NEGATIVE
LYMPHOCYTES # BLD AUTO: 0.22 10*3/MM3 (ref 0.7–3.1)
LYMPHOCYTES # BLD AUTO: 0.47 10*3/MM3 (ref 0.7–3.1)
LYMPHOCYTES # BLD AUTO: 0.81 10*3/MM3 (ref 0.7–3.1)
LYMPHOCYTES # BLD AUTO: 0.9 10*3/MM3 (ref 0.7–3.1)
LYMPHOCYTES # BLD AUTO: 0.9 10*3/MM3 (ref 0.7–3.1)
LYMPHOCYTES # BLD AUTO: 0.94 10*3/MM3 (ref 0.7–3.1)
LYMPHOCYTES # BLD AUTO: 1.03 10*3/MM3 (ref 0.7–3.1)
LYMPHOCYTES NFR BLD AUTO: 11 % (ref 19.6–45.3)
LYMPHOCYTES NFR BLD AUTO: 23.6 % (ref 19.6–45.3)
LYMPHOCYTES NFR BLD AUTO: 6.9 % (ref 19.6–45.3)
LYMPHOCYTES NFR BLD AUTO: 7.8 % (ref 19.6–45.3)
LYMPHOCYTES NFR BLD AUTO: 8 % (ref 19.6–45.3)
LYMPHOCYTES NFR BLD AUTO: 9.7 % (ref 19.6–45.3)
LYMPHOCYTES NFR BLD AUTO: 9.9 % (ref 19.6–45.3)
Lab: ABNORMAL
M PNEUMO IGG SER IA-ACNC: NOT DETECTED
MAGNESIUM SERPL-MCNC: 1.9 MG/DL (ref 1.6–2.4)
MAGNESIUM SERPL-MCNC: 2 MG/DL (ref 1.6–2.4)
MAGNESIUM SERPL-MCNC: 2 MG/DL (ref 1.6–2.4)
MAGNESIUM SERPL-MCNC: 2.5 MG/DL (ref 1.6–2.4)
MAGNESIUM SERPL-MCNC: 2.6 MG/DL (ref 1.6–2.4)
MAGNESIUM SERPL-MCNC: 2.7 MG/DL (ref 1.6–2.4)
MAXIMAL PREDICTED HEART RATE: 144 BPM
MCH RBC QN AUTO: 30.7 PG (ref 26.6–33)
MCH RBC QN AUTO: 30.8 PG (ref 26.6–33)
MCH RBC QN AUTO: 30.8 PG (ref 26.6–33)
MCH RBC QN AUTO: 31 PG (ref 26.6–33)
MCH RBC QN AUTO: 31.5 PG (ref 26.6–33)
MCH RBC QN AUTO: 31.6 PG (ref 26.6–33)
MCH RBC QN AUTO: 32.3 PG (ref 26.6–33)
MCH RBC QN AUTO: 32.5 PG (ref 26.6–33)
MCHC RBC AUTO-ENTMCNC: 30.8 G/DL (ref 31.5–35.7)
MCHC RBC AUTO-ENTMCNC: 31.4 G/DL (ref 31.5–35.7)
MCHC RBC AUTO-ENTMCNC: 32 G/DL (ref 31.5–35.7)
MCHC RBC AUTO-ENTMCNC: 32.3 G/DL (ref 31.5–35.7)
MCHC RBC AUTO-ENTMCNC: 32.5 G/DL (ref 31.5–35.7)
MCHC RBC AUTO-ENTMCNC: 32.6 G/DL (ref 31.5–35.7)
MCHC RBC AUTO-ENTMCNC: 32.9 G/DL (ref 31.5–35.7)
MCHC RBC AUTO-ENTMCNC: 33.1 G/DL (ref 31.5–35.7)
MCV RBC AUTO: 105.1 FL (ref 79–97)
MCV RBC AUTO: 94.7 FL (ref 79–97)
MCV RBC AUTO: 95.8 FL (ref 79–97)
MCV RBC AUTO: 95.8 FL (ref 79–97)
MCV RBC AUTO: 95.9 FL (ref 79–97)
MCV RBC AUTO: 96.9 FL (ref 79–97)
MCV RBC AUTO: 98 FL (ref 79–97)
MCV RBC AUTO: 98.2 FL (ref 79–97)
METHYLMALONATE SERPL-SCNC: 876 NMOL/L (ref 0–378)
MONOCYTES # BLD AUTO: 0.07 10*3/MM3 (ref 0.1–0.9)
MONOCYTES # BLD AUTO: 0.26 10*3/MM3 (ref 0.1–0.9)
MONOCYTES # BLD AUTO: 0.31 10*3/MM3 (ref 0.1–0.9)
MONOCYTES # BLD AUTO: 0.66 10*3/MM3 (ref 0.1–0.9)
MONOCYTES # BLD AUTO: 0.69 10*3/MM3 (ref 0.1–0.9)
MONOCYTES # BLD AUTO: 0.74 10*3/MM3 (ref 0.1–0.9)
MONOCYTES # BLD AUTO: 0.79 10*3/MM3 (ref 0.1–0.9)
MONOCYTES NFR BLD AUTO: 2.2 % (ref 5–12)
MONOCYTES NFR BLD AUTO: 5.9 % (ref 5–12)
MONOCYTES NFR BLD AUTO: 6.5 % (ref 5–12)
MONOCYTES NFR BLD AUTO: 7.1 % (ref 5–12)
MONOCYTES NFR BLD AUTO: 7.2 % (ref 5–12)
MONOCYTES NFR BLD AUTO: 7.4 % (ref 5–12)
MONOCYTES NFR BLD AUTO: 7.6 % (ref 5–12)
MRSA DNA SPEC QL NAA+PROBE: NORMAL
NEUTROPHILS NFR BLD AUTO: 2.66 10*3/MM3 (ref 1.7–7)
NEUTROPHILS NFR BLD AUTO: 2.88 10*3/MM3 (ref 1.7–7)
NEUTROPHILS NFR BLD AUTO: 3.47 10*3/MM3 (ref 1.7–7)
NEUTROPHILS NFR BLD AUTO: 66.8 % (ref 42.7–76)
NEUTROPHILS NFR BLD AUTO: 7.36 10*3/MM3 (ref 1.7–7)
NEUTROPHILS NFR BLD AUTO: 79.4 % (ref 42.7–76)
NEUTROPHILS NFR BLD AUTO: 79.6 % (ref 42.7–76)
NEUTROPHILS NFR BLD AUTO: 8.29 10*3/MM3 (ref 1.7–7)
NEUTROPHILS NFR BLD AUTO: 8.58 10*3/MM3 (ref 1.7–7)
NEUTROPHILS NFR BLD AUTO: 80.9 % (ref 42.7–76)
NEUTROPHILS NFR BLD AUTO: 82.7 % (ref 42.7–76)
NEUTROPHILS NFR BLD AUTO: 84.4 % (ref 42.7–76)
NEUTROPHILS NFR BLD AUTO: 9.46 10*3/MM3 (ref 1.7–7)
NEUTROPHILS NFR BLD AUTO: 90.3 % (ref 42.7–76)
NITRITE UR QL STRIP: NEGATIVE
NRBC BLD AUTO-RTO: 0 /100 WBC (ref 0–0.2)
NT-PROBNP SERPL-MCNC: ABNORMAL PG/ML (ref 0–1800)
NT-PROBNP SERPL-MCNC: ABNORMAL PG/ML (ref 0–1800)
PH UR STRIP.AUTO: 5.5 [PH] (ref 5–8)
PH UR STRIP.AUTO: 6 [PH] (ref 5–8)
PH UR STRIP.AUTO: 7 [PH] (ref 5–8)
PHOSPHATE SERPL-MCNC: 3.4 MG/DL (ref 2.5–4.5)
PHOSPHATE SERPL-MCNC: 3.5 MG/DL (ref 2.5–4.5)
PHOSPHATE SERPL-MCNC: 3.6 MG/DL (ref 2.5–4.5)
PHOSPHATE SERPL-MCNC: 3.8 MG/DL (ref 2.5–4.5)
PHOSPHATE SERPL-MCNC: 3.8 MG/DL (ref 2.5–4.5)
PHOSPHATE SERPL-MCNC: 3.9 MG/DL (ref 2.5–4.5)
PHOSPHATE SERPL-MCNC: 4.4 MG/DL (ref 2.5–4.5)
PLAT MORPH BLD: NORMAL
PLATELET # BLD AUTO: 157 10*3/MM3 (ref 140–450)
PLATELET # BLD AUTO: 181 10*3/MM3 (ref 140–450)
PLATELET # BLD AUTO: 216 10*3/MM3 (ref 140–450)
PLATELET # BLD AUTO: 262 10*3/MM3 (ref 140–450)
PLATELET # BLD AUTO: 268 10*3/MM3 (ref 140–450)
PLATELET # BLD AUTO: 286 10*3/MM3 (ref 140–450)
PLATELET # BLD AUTO: 348 10*3/MM3 (ref 140–450)
PLATELET # BLD AUTO: 360 10*3/MM3 (ref 140–450)
PMV BLD AUTO: 10.5 FL (ref 6–12)
PMV BLD AUTO: 10.6 FL (ref 6–12)
PMV BLD AUTO: 10.7 FL (ref 6–12)
PMV BLD AUTO: 11.2 FL (ref 6–12)
PMV BLD AUTO: 11.4 FL (ref 6–12)
PMV BLD AUTO: 11.5 FL (ref 6–12)
PMV BLD AUTO: 11.6 FL (ref 6–12)
PMV BLD AUTO: 11.6 FL (ref 6–12)
POTASSIUM SERPL-SCNC: 2.7 MMOL/L (ref 3.5–5.2)
POTASSIUM SERPL-SCNC: 3.2 MMOL/L (ref 3.5–5.2)
POTASSIUM SERPL-SCNC: 3.3 MMOL/L (ref 3.5–5.2)
POTASSIUM SERPL-SCNC: 3.4 MMOL/L (ref 3.5–5.2)
POTASSIUM SERPL-SCNC: 3.5 MMOL/L (ref 3.5–5.2)
POTASSIUM SERPL-SCNC: 3.6 MMOL/L (ref 3.5–5.2)
POTASSIUM SERPL-SCNC: 3.7 MMOL/L (ref 3.5–5.2)
POTASSIUM SERPL-SCNC: 3.8 MMOL/L (ref 3.5–5.2)
POTASSIUM SERPL-SCNC: 3.8 MMOL/L (ref 3.5–5.2)
POTASSIUM SERPL-SCNC: 4 MMOL/L (ref 3.5–5.2)
POTASSIUM SERPL-SCNC: 4.3 MMOL/L (ref 3.5–5.2)
PROCALCITONIN SERPL-MCNC: 0.24 NG/ML (ref 0–0.25)
PROCALCITONIN SERPL-MCNC: 0.42 NG/ML (ref 0–0.25)
PROCALCITONIN SERPL-MCNC: 0.72 NG/ML (ref 0–0.25)
PROT SERPL-MCNC: 5.4 G/DL (ref 6–8.5)
PROT SERPL-MCNC: 5.5 G/DL (ref 6–8.5)
PROT SERPL-MCNC: 6.1 G/DL (ref 6–8.5)
PROT UR QL STRIP: ABNORMAL
QT INTERVAL: 314 MS
QTC INTERVAL: 471 MS
RBC # BLD AUTO: 1.97 10*6/MM3 (ref 3.77–5.28)
RBC # BLD AUTO: 2.47 10*6/MM3 (ref 3.77–5.28)
RBC # BLD AUTO: 2.79 10*6/MM3 (ref 3.77–5.28)
RBC # BLD AUTO: 2.87 10*6/MM3 (ref 3.77–5.28)
RBC # BLD AUTO: 2.89 10*6/MM3 (ref 3.77–5.28)
RBC # BLD AUTO: 3.2 10*6/MM3 (ref 3.77–5.28)
RBC # BLD AUTO: 3.51 10*6/MM3 (ref 3.77–5.28)
RBC # BLD AUTO: 3.93 10*6/MM3 (ref 3.77–5.28)
RBC # UR: ABNORMAL /HPF
REF LAB TEST METHOD: ABNORMAL
RHINOVIRUS RNA SPEC NAA+PROBE: NOT DETECTED
RSV RNA NPH QL NAA+NON-PROBE: NOT DETECTED
S PNEUM AG SPEC QL LA: NEGATIVE
SARS-COV-2 RNA PNL SPEC NAA+PROBE: DETECTED
SARS-COV-2 RNA PNL SPEC NAA+PROBE: NOT DETECTED
SARS-COV-2 RNA PNL SPEC NAA+PROBE: NOT DETECTED
SODIUM SERPL-SCNC: 138 MMOL/L (ref 136–145)
SODIUM SERPL-SCNC: 140 MMOL/L (ref 136–145)
SODIUM SERPL-SCNC: 141 MMOL/L (ref 136–145)
SODIUM SERPL-SCNC: 142 MMOL/L (ref 136–145)
SODIUM SERPL-SCNC: 143 MMOL/L (ref 136–145)
SODIUM SERPL-SCNC: 145 MMOL/L (ref 136–145)
SODIUM SERPL-SCNC: 147 MMOL/L (ref 136–145)
SODIUM SERPL-SCNC: 149 MMOL/L (ref 136–145)
SODIUM SERPL-SCNC: 154 MMOL/L (ref 136–145)
SODIUM SERPL-SCNC: 155 MMOL/L (ref 136–145)
SODIUM SERPL-SCNC: 159 MMOL/L (ref 136–145)
SP GR UR STRIP: 1.02 (ref 1–1.03)
SQUAMOUS #/AREA URNS HPF: ABNORMAL /HPF
STRESS TARGET HR: 122 BPM
TIBC SERPL-MCNC: 177 MCG/DL (ref 298–536)
TRANSFERRIN SERPL-MCNC: 119 MG/DL (ref 200–360)
TROPONIN T SERPL-MCNC: 0.02 NG/ML (ref 0–0.03)
TROPONIN T SERPL-MCNC: 0.03 NG/ML (ref 0–0.03)
TROPONIN T SERPL-MCNC: 0.04 NG/ML (ref 0–0.03)
TROPONIN T SERPL-MCNC: 0.04 NG/ML (ref 0–0.03)
TSH SERPL DL<=0.05 MIU/L-ACNC: 2.03 UIU/ML (ref 0.27–4.2)
UROBILINOGEN UR QL STRIP: ABNORMAL
VIT B12 BLD-MCNC: 581 PG/ML (ref 211–946)
WBC # BLD AUTO: 10.37 10*3/MM3 (ref 3.4–10.8)
WBC # BLD AUTO: 10.41 10*3/MM3 (ref 3.4–10.8)
WBC # BLD AUTO: 11.21 10*3/MM3 (ref 3.4–10.8)
WBC # BLD AUTO: 3.19 10*3/MM3 (ref 3.4–10.8)
WBC # BLD AUTO: 3.98 10*3/MM3 (ref 3.4–10.8)
WBC # BLD AUTO: 4.29 10*3/MM3 (ref 3.4–10.8)
WBC # BLD AUTO: 8.34 10*3/MM3 (ref 3.4–10.8)
WBC # BLD AUTO: 9.28 10*3/MM3 (ref 3.4–10.8)
WBC MORPH BLD: NORMAL
WBC UR QL AUTO: ABNORMAL /HPF
WHOLE BLOOD HOLD SPECIMEN: NORMAL
WHOLE BLOOD HOLD SPECIMEN: NORMAL

## 2021-01-01 PROCEDURE — 87086 URINE CULTURE/COLONY COUNT: CPT | Performed by: INTERNAL MEDICINE

## 2021-01-01 PROCEDURE — 97161 PT EVAL LOW COMPLEX 20 MIN: CPT

## 2021-01-01 PROCEDURE — 87635 SARS-COV-2 COVID-19 AMP PRB: CPT | Performed by: INTERNAL MEDICINE

## 2021-01-01 PROCEDURE — 99223 1ST HOSP IP/OBS HIGH 75: CPT | Performed by: INTERNAL MEDICINE

## 2021-01-01 PROCEDURE — 99305 1ST NF CARE MODERATE MDM 35: CPT | Performed by: INTERNAL MEDICINE

## 2021-01-01 PROCEDURE — 94660 CPAP INITIATION&MGMT: CPT

## 2021-01-01 PROCEDURE — 94799 UNLISTED PULMONARY SVC/PX: CPT

## 2021-01-01 PROCEDURE — 99223 1ST HOSP IP/OBS HIGH 75: CPT | Performed by: FAMILY MEDICINE

## 2021-01-01 PROCEDURE — 25010000002 MEROPENEM IN SODIUM CHLORIDE 0.9% 50 ML 1 GM/50ML RECONSTITUTED SOLUTION: Performed by: INTERNAL MEDICINE

## 2021-01-01 PROCEDURE — 99232 SBSQ HOSP IP/OBS MODERATE 35: CPT | Performed by: INTERNAL MEDICINE

## 2021-01-01 PROCEDURE — 25810000003 DEXTROSE 5 % WITH KCL 20 MEQ 20-5 MEQ/L-% SOLUTION: Performed by: INTERNAL MEDICINE

## 2021-01-01 PROCEDURE — 99497 ADVNCD CARE PLAN 30 MIN: CPT | Performed by: INTERNAL MEDICINE

## 2021-01-01 PROCEDURE — 83735 ASSAY OF MAGNESIUM: CPT | Performed by: INTERNAL MEDICINE

## 2021-01-01 PROCEDURE — 99309 SBSQ NF CARE MODERATE MDM 30: CPT | Performed by: PHYSICIAN ASSISTANT

## 2021-01-01 PROCEDURE — 93306 TTE W/DOPPLER COMPLETE: CPT

## 2021-01-01 PROCEDURE — 85014 HEMATOCRIT: CPT | Performed by: FAMILY MEDICINE

## 2021-01-01 PROCEDURE — 71045 X-RAY EXAM CHEST 1 VIEW: CPT

## 2021-01-01 PROCEDURE — 84466 ASSAY OF TRANSFERRIN: CPT | Performed by: INTERNAL MEDICINE

## 2021-01-01 PROCEDURE — 93010 ELECTROCARDIOGRAM REPORT: CPT | Performed by: INTERNAL MEDICINE

## 2021-01-01 PROCEDURE — 85025 COMPLETE CBC W/AUTO DIFF WBC: CPT

## 2021-01-01 PROCEDURE — 99239 HOSP IP/OBS DSCHRG MGMT >30: CPT | Performed by: FAMILY MEDICINE

## 2021-01-01 PROCEDURE — 99233 SBSQ HOSP IP/OBS HIGH 50: CPT | Performed by: INTERNAL MEDICINE

## 2021-01-01 PROCEDURE — 85025 COMPLETE CBC W/AUTO DIFF WBC: CPT | Performed by: FAMILY MEDICINE

## 2021-01-01 PROCEDURE — 85027 COMPLETE CBC AUTOMATED: CPT | Performed by: FAMILY MEDICINE

## 2021-01-01 PROCEDURE — 87633 RESP VIRUS 12-25 TARGETS: CPT | Performed by: INTERNAL MEDICINE

## 2021-01-01 PROCEDURE — 99284 EMERGENCY DEPT VISIT MOD MDM: CPT

## 2021-01-01 PROCEDURE — 63710000001 DEXAMETHASONE PER 0.25 MG: Performed by: INTERNAL MEDICINE

## 2021-01-01 PROCEDURE — 85025 COMPLETE CBC W/AUTO DIFF WBC: CPT | Performed by: INTERNAL MEDICINE

## 2021-01-01 PROCEDURE — 87899 AGENT NOS ASSAY W/OPTIC: CPT | Performed by: FAMILY MEDICINE

## 2021-01-01 PROCEDURE — 83880 ASSAY OF NATRIURETIC PEPTIDE: CPT | Performed by: PHYSICIAN ASSISTANT

## 2021-01-01 PROCEDURE — 25010000003 MAGNESIUM SULFATE 4 GM/100ML SOLUTION: Performed by: INTERNAL MEDICINE

## 2021-01-01 PROCEDURE — 99232 SBSQ HOSP IP/OBS MODERATE 35: CPT | Performed by: FAMILY MEDICINE

## 2021-01-01 PROCEDURE — 80048 BASIC METABOLIC PNL TOTAL CA: CPT | Performed by: INTERNAL MEDICINE

## 2021-01-01 PROCEDURE — 25010000002 ENOXAPARIN PER 10 MG: Performed by: INTERNAL MEDICINE

## 2021-01-01 PROCEDURE — 85018 HEMOGLOBIN: CPT | Performed by: FAMILY MEDICINE

## 2021-01-01 PROCEDURE — 25010000003 POTASSIUM CHLORIDE 10 MEQ/100ML SOLUTION: Performed by: INTERNAL MEDICINE

## 2021-01-01 PROCEDURE — 99285 EMERGENCY DEPT VISIT HI MDM: CPT

## 2021-01-01 PROCEDURE — 83921 ORGANIC ACID SINGLE QUANT: CPT | Performed by: INTERNAL MEDICINE

## 2021-01-01 PROCEDURE — 92611 MOTION FLUOROSCOPY/SWALLOW: CPT

## 2021-01-01 PROCEDURE — 99222 1ST HOSP IP/OBS MODERATE 55: CPT | Performed by: INTERNAL MEDICINE

## 2021-01-01 PROCEDURE — 81001 URINALYSIS AUTO W/SCOPE: CPT | Performed by: INTERNAL MEDICINE

## 2021-01-01 PROCEDURE — 85007 BL SMEAR W/DIFF WBC COUNT: CPT

## 2021-01-01 PROCEDURE — 84145 PROCALCITONIN (PCT): CPT | Performed by: INTERNAL MEDICINE

## 2021-01-01 PROCEDURE — 84145 PROCALCITONIN (PCT): CPT | Performed by: PHYSICIAN ASSISTANT

## 2021-01-01 PROCEDURE — 99214 OFFICE O/P EST MOD 30 MIN: CPT | Performed by: INTERNAL MEDICINE

## 2021-01-01 PROCEDURE — 80048 BASIC METABOLIC PNL TOTAL CA: CPT | Performed by: FAMILY MEDICINE

## 2021-01-01 PROCEDURE — 87635 SARS-COV-2 COVID-19 AMP PRB: CPT | Performed by: EMERGENCY MEDICINE

## 2021-01-01 PROCEDURE — 81001 URINALYSIS AUTO W/SCOPE: CPT | Performed by: PHYSICIAN ASSISTANT

## 2021-01-01 PROCEDURE — 84484 ASSAY OF TROPONIN QUANT: CPT | Performed by: INTERNAL MEDICINE

## 2021-01-01 PROCEDURE — 25010000002 FUROSEMIDE PER 20 MG: Performed by: INTERNAL MEDICINE

## 2021-01-01 PROCEDURE — 84484 ASSAY OF TROPONIN QUANT: CPT | Performed by: PHYSICIAN ASSISTANT

## 2021-01-01 PROCEDURE — 84132 ASSAY OF SERUM POTASSIUM: CPT | Performed by: INTERNAL MEDICINE

## 2021-01-01 PROCEDURE — 80069 RENAL FUNCTION PANEL: CPT | Performed by: INTERNAL MEDICINE

## 2021-01-01 PROCEDURE — 82746 ASSAY OF FOLIC ACID SERUM: CPT | Performed by: INTERNAL MEDICINE

## 2021-01-01 PROCEDURE — 93005 ELECTROCARDIOGRAM TRACING: CPT

## 2021-01-01 PROCEDURE — 83605 ASSAY OF LACTIC ACID: CPT | Performed by: INTERNAL MEDICINE

## 2021-01-01 PROCEDURE — 93005 ELECTROCARDIOGRAM TRACING: CPT | Performed by: PHYSICIAN ASSISTANT

## 2021-01-01 PROCEDURE — 87635 SARS-COV-2 COVID-19 AMP PRB: CPT | Performed by: PHYSICIAN ASSISTANT

## 2021-01-01 PROCEDURE — 92610 EVALUATE SWALLOWING FUNCTION: CPT

## 2021-01-01 PROCEDURE — 83540 ASSAY OF IRON: CPT | Performed by: INTERNAL MEDICINE

## 2021-01-01 PROCEDURE — 81001 URINALYSIS AUTO W/SCOPE: CPT

## 2021-01-01 PROCEDURE — 84484 ASSAY OF TROPONIN QUANT: CPT

## 2021-01-01 PROCEDURE — 83735 ASSAY OF MAGNESIUM: CPT

## 2021-01-01 PROCEDURE — 86140 C-REACTIVE PROTEIN: CPT | Performed by: INTERNAL MEDICINE

## 2021-01-01 PROCEDURE — G0378 HOSPITAL OBSERVATION PER HR: HCPCS

## 2021-01-01 PROCEDURE — 93306 TTE W/DOPPLER COMPLETE: CPT | Performed by: INTERNAL MEDICINE

## 2021-01-01 PROCEDURE — P9612 CATHETERIZE FOR URINE SPEC: HCPCS

## 2021-01-01 PROCEDURE — 83036 HEMOGLOBIN GLYCOSYLATED A1C: CPT | Performed by: INTERNAL MEDICINE

## 2021-01-01 PROCEDURE — 80053 COMPREHEN METABOLIC PANEL: CPT

## 2021-01-01 PROCEDURE — 25010000002 FUROSEMIDE PER 20 MG: Performed by: PHYSICIAN ASSISTANT

## 2021-01-01 PROCEDURE — 87040 BLOOD CULTURE FOR BACTERIA: CPT | Performed by: PHYSICIAN ASSISTANT

## 2021-01-01 PROCEDURE — 93000 ELECTROCARDIOGRAM COMPLETE: CPT | Performed by: INTERNAL MEDICINE

## 2021-01-01 PROCEDURE — 83605 ASSAY OF LACTIC ACID: CPT | Performed by: FAMILY MEDICINE

## 2021-01-01 PROCEDURE — 99498 ADVNCD CARE PLAN ADDL 30 MIN: CPT | Performed by: INTERNAL MEDICINE

## 2021-01-01 PROCEDURE — 3E0333Z INTRODUCTION OF ANTI-INFLAMMATORY INTO PERIPHERAL VEIN, PERCUTANEOUS APPROACH: ICD-10-PCS | Performed by: INTERNAL MEDICINE

## 2021-01-01 PROCEDURE — 74230 X-RAY XM SWLNG FUNCJ C+: CPT

## 2021-01-01 PROCEDURE — 83880 ASSAY OF NATRIURETIC PEPTIDE: CPT | Performed by: INTERNAL MEDICINE

## 2021-01-01 PROCEDURE — 97166 OT EVAL MOD COMPLEX 45 MIN: CPT

## 2021-01-01 PROCEDURE — 94640 AIRWAY INHALATION TREATMENT: CPT

## 2021-01-01 PROCEDURE — 85025 COMPLETE CBC W/AUTO DIFF WBC: CPT | Performed by: PHYSICIAN ASSISTANT

## 2021-01-01 PROCEDURE — 87641 MR-STAPH DNA AMP PROBE: CPT | Performed by: INTERNAL MEDICINE

## 2021-01-01 PROCEDURE — 99239 HOSP IP/OBS DSCHRG MGMT >30: CPT | Performed by: INTERNAL MEDICINE

## 2021-01-01 PROCEDURE — 82272 OCCULT BLD FECES 1-3 TESTS: CPT | Performed by: INTERNAL MEDICINE

## 2021-01-01 PROCEDURE — 97162 PT EVAL MOD COMPLEX 30 MIN: CPT

## 2021-01-01 PROCEDURE — 25010000002 AZITHROMYCIN 500 MG/250 ML: Performed by: FAMILY MEDICINE

## 2021-01-01 PROCEDURE — 93005 ELECTROCARDIOGRAM TRACING: CPT | Performed by: INTERNAL MEDICINE

## 2021-01-01 PROCEDURE — 99316 NF DSCHRG MGMT 30 MIN+: CPT | Performed by: PHYSICIAN ASSISTANT

## 2021-01-01 PROCEDURE — 82607 VITAMIN B-12: CPT | Performed by: INTERNAL MEDICINE

## 2021-01-01 PROCEDURE — 84443 ASSAY THYROID STIM HORMONE: CPT | Performed by: FAMILY MEDICINE

## 2021-01-01 PROCEDURE — 80053 COMPREHEN METABOLIC PANEL: CPT | Performed by: PHYSICIAN ASSISTANT

## 2021-01-01 PROCEDURE — 80053 COMPREHEN METABOLIC PANEL: CPT | Performed by: FAMILY MEDICINE

## 2021-01-01 PROCEDURE — 84100 ASSAY OF PHOSPHORUS: CPT | Performed by: INTERNAL MEDICINE

## 2021-01-01 PROCEDURE — 25010000002 DEXAMETHASONE PER 1 MG: Performed by: INTERNAL MEDICINE

## 2021-01-01 RX ORDER — AMLODIPINE BESYLATE 5 MG/1
10 TABLET ORAL
Status: DISCONTINUED | OUTPATIENT
Start: 2021-01-01 | End: 2021-01-01 | Stop reason: HOSPADM

## 2021-01-01 RX ORDER — ACETAMINOPHEN 325 MG/1
650 TABLET ORAL EVERY 6 HOURS PRN
COMMUNITY
End: 2021-01-01 | Stop reason: HOSPADM

## 2021-01-01 RX ORDER — MAGNESIUM SULFATE HEPTAHYDRATE 40 MG/ML
4 INJECTION, SOLUTION INTRAVENOUS AS NEEDED
Status: DISCONTINUED | OUTPATIENT
Start: 2021-01-01 | End: 2021-01-01 | Stop reason: HOSPADM

## 2021-01-01 RX ORDER — DULOXETIN HYDROCHLORIDE 20 MG/1
60 CAPSULE, DELAYED RELEASE ORAL DAILY
Status: DISCONTINUED | OUTPATIENT
Start: 2021-01-01 | End: 2021-01-01 | Stop reason: HOSPADM

## 2021-01-01 RX ORDER — FUROSEMIDE 10 MG/ML
40 INJECTION INTRAMUSCULAR; INTRAVENOUS DAILY
Status: DISCONTINUED | OUTPATIENT
Start: 2021-01-01 | End: 2021-01-01

## 2021-01-01 RX ORDER — POTASSIUM CHLORIDE 750 MG/1
40 CAPSULE, EXTENDED RELEASE ORAL AS NEEDED
Status: DISCONTINUED | OUTPATIENT
Start: 2021-01-01 | End: 2021-01-01 | Stop reason: HOSPADM

## 2021-01-01 RX ORDER — MEROPENEM AND SODIUM CHLORIDE 1 G/50ML
1 INJECTION, SOLUTION INTRAVENOUS ONCE
Status: COMPLETED | OUTPATIENT
Start: 2021-01-01 | End: 2021-01-01

## 2021-01-01 RX ORDER — MEMANTINE HYDROCHLORIDE 5 MG/1
10 TABLET ORAL EVERY 12 HOURS SCHEDULED
Status: DISCONTINUED | OUTPATIENT
Start: 2021-01-01 | End: 2021-01-01 | Stop reason: HOSPADM

## 2021-01-01 RX ORDER — POTASSIUM CHLORIDE 1.5 G/1.77G
40 POWDER, FOR SOLUTION ORAL EVERY 4 HOURS
Status: DISCONTINUED | OUTPATIENT
Start: 2021-01-01 | End: 2021-01-01 | Stop reason: HOSPADM

## 2021-01-01 RX ORDER — MAGNESIUM SULFATE HEPTAHYDRATE 40 MG/ML
4 INJECTION, SOLUTION INTRAVENOUS ONCE
Status: COMPLETED | OUTPATIENT
Start: 2021-01-01 | End: 2021-01-01

## 2021-01-01 RX ORDER — GUAIFENESIN 600 MG/1
600 TABLET, EXTENDED RELEASE ORAL 2 TIMES DAILY
Status: DISCONTINUED | OUTPATIENT
Start: 2021-01-01 | End: 2021-01-01 | Stop reason: CLARIF

## 2021-01-01 RX ORDER — DONEPEZIL HYDROCHLORIDE 5 MG/1
10 TABLET, FILM COATED ORAL NIGHTLY
Status: DISCONTINUED | OUTPATIENT
Start: 2021-01-01 | End: 2021-01-01 | Stop reason: HOSPADM

## 2021-01-01 RX ORDER — GABAPENTIN 300 MG/1
300 CAPSULE ORAL EVERY 12 HOURS SCHEDULED
Qty: 6 CAPSULE | Refills: 0 | Status: SHIPPED | OUTPATIENT
Start: 2021-01-01 | End: 2021-01-01 | Stop reason: SDUPTHER

## 2021-01-01 RX ORDER — SODIUM CHLORIDE 9 MG/ML
100 INJECTION, SOLUTION INTRAVENOUS CONTINUOUS
Status: ACTIVE | OUTPATIENT
Start: 2021-01-01 | End: 2021-01-01

## 2021-01-01 RX ORDER — QUETIAPINE FUMARATE 25 MG/1
25 TABLET, FILM COATED ORAL NIGHTLY
Status: DISCONTINUED | OUTPATIENT
Start: 2021-01-01 | End: 2021-01-01

## 2021-01-01 RX ORDER — ONDANSETRON 2 MG/ML
4 INJECTION INTRAMUSCULAR; INTRAVENOUS EVERY 6 HOURS PRN
Status: DISCONTINUED | OUTPATIENT
Start: 2021-01-01 | End: 2021-01-01 | Stop reason: HOSPADM

## 2021-01-01 RX ORDER — DILTIAZEM HYDROCHLORIDE 5 MG/ML
10 INJECTION INTRAVENOUS ONCE
Status: COMPLETED | OUTPATIENT
Start: 2021-01-01 | End: 2021-01-01

## 2021-01-01 RX ORDER — SODIUM CHLORIDE 0.9 % (FLUSH) 0.9 %
10 SYRINGE (ML) INJECTION AS NEEDED
Status: DISCONTINUED | OUTPATIENT
Start: 2021-01-01 | End: 2021-01-01 | Stop reason: HOSPADM

## 2021-01-01 RX ORDER — BUDESONIDE 0.5 MG/2ML
0.5 INHALANT ORAL
Status: DISCONTINUED | OUTPATIENT
Start: 2021-01-01 | End: 2021-01-01 | Stop reason: HOSPADM

## 2021-01-01 RX ORDER — ONDANSETRON 4 MG/1
4 TABLET, FILM COATED ORAL EVERY 6 HOURS PRN
Status: DISCONTINUED | OUTPATIENT
Start: 2021-01-01 | End: 2021-01-01 | Stop reason: HOSPADM

## 2021-01-01 RX ORDER — AMINO ACIDS/PROTEIN HYDROLYS 15G-100/30
30 LIQUID (ML) ORAL 2 TIMES DAILY
Status: DISCONTINUED | OUTPATIENT
Start: 2021-01-01 | End: 2021-01-01 | Stop reason: HOSPADM

## 2021-01-01 RX ORDER — ANORECTAL OINTMENT 15.7; .44; 24; 20.6 G/100G; G/100G; G/100G; G/100G
OINTMENT TOPICAL
Status: ON HOLD | COMMUNITY
End: 2021-01-01

## 2021-01-01 RX ORDER — AMLODIPINE BESYLATE 5 MG/1
5 TABLET ORAL
Status: DISCONTINUED | OUTPATIENT
Start: 2021-01-01 | End: 2021-01-01

## 2021-01-01 RX ORDER — DOXYCYCLINE HYCLATE 100 MG/1
100 TABLET, DELAYED RELEASE ORAL 2 TIMES DAILY
Start: 2021-01-01 | End: 2021-01-01

## 2021-01-01 RX ORDER — POTASSIUM CHLORIDE 7.45 MG/ML
10 INJECTION INTRAVENOUS
Status: DISCONTINUED | OUTPATIENT
Start: 2021-01-01 | End: 2021-01-01 | Stop reason: HOSPADM

## 2021-01-01 RX ORDER — ARGININE/ASCORBATE SOD/VITE AC 4.5 G/9.2G
1 POWDER IN PACKET (EA) ORAL 2 TIMES DAILY
COMMUNITY

## 2021-01-01 RX ORDER — LORAZEPAM 2 MG/ML
0.5 INJECTION INTRAMUSCULAR ONCE AS NEEDED
Status: DISCONTINUED | OUTPATIENT
Start: 2021-01-01 | End: 2021-01-01

## 2021-01-01 RX ORDER — DEXAMETHASONE 6 MG/1
6 TABLET ORAL
Qty: 2 TABLET | Refills: 0
Start: 2021-01-01 | End: 2021-01-01

## 2021-01-01 RX ORDER — AMLODIPINE BESYLATE 10 MG/1
10 TABLET ORAL
Start: 2021-01-01 | End: 2021-01-01 | Stop reason: HOSPADM

## 2021-01-01 RX ORDER — SODIUM CHLORIDE 0.9 % (FLUSH) 0.9 %
10 SYRINGE (ML) INJECTION EVERY 12 HOURS SCHEDULED
Status: DISCONTINUED | OUTPATIENT
Start: 2021-01-01 | End: 2021-01-01 | Stop reason: HOSPADM

## 2021-01-01 RX ORDER — POTASSIUM CHLORIDE 750 MG/1
40 CAPSULE, EXTENDED RELEASE ORAL EVERY 4 HOURS
Status: COMPLETED | OUTPATIENT
Start: 2021-01-01 | End: 2021-01-01

## 2021-01-01 RX ORDER — ACETAMINOPHEN 160 MG/5ML
650 SOLUTION ORAL EVERY 4 HOURS PRN
Status: DISCONTINUED | OUTPATIENT
Start: 2021-01-01 | End: 2021-01-01 | Stop reason: HOSPADM

## 2021-01-01 RX ORDER — ISOSORBIDE DINITRATE 20 MG/1
10 TABLET ORAL
Status: DISCONTINUED | OUTPATIENT
Start: 2021-01-01 | End: 2021-01-01 | Stop reason: HOSPADM

## 2021-01-01 RX ORDER — FUROSEMIDE 10 MG/ML
40 INJECTION INTRAMUSCULAR; INTRAVENOUS ONCE
Status: COMPLETED | OUTPATIENT
Start: 2021-01-01 | End: 2021-01-01

## 2021-01-01 RX ORDER — GUAIFENESIN 600 MG/1
600 TABLET, EXTENDED RELEASE ORAL 2 TIMES DAILY
Start: 2021-01-01

## 2021-01-01 RX ORDER — GABAPENTIN 300 MG/1
300 CAPSULE ORAL EVERY 12 HOURS SCHEDULED
Qty: 6 CAPSULE | Refills: 0 | Status: SHIPPED | OUTPATIENT
Start: 2021-01-01 | End: 2021-01-01

## 2021-01-01 RX ORDER — AMOXICILLIN 250 MG
1 CAPSULE ORAL 2 TIMES DAILY PRN
Status: DISCONTINUED | OUTPATIENT
Start: 2021-01-01 | End: 2021-01-01 | Stop reason: HOSPADM

## 2021-01-01 RX ORDER — CLOPIDOGREL BISULFATE 75 MG/1
75 TABLET ORAL DAILY
Status: DISCONTINUED | OUTPATIENT
Start: 2021-01-01 | End: 2021-01-01 | Stop reason: HOSPADM

## 2021-01-01 RX ORDER — FUROSEMIDE 10 MG/ML
80 INJECTION INTRAMUSCULAR; INTRAVENOUS ONCE
Status: COMPLETED | OUTPATIENT
Start: 2021-01-01 | End: 2021-01-01

## 2021-01-01 RX ORDER — ACETAMINOPHEN 650 MG/1
650 SUPPOSITORY RECTAL EVERY 4 HOURS PRN
Status: DISCONTINUED | OUTPATIENT
Start: 2021-01-01 | End: 2021-01-01 | Stop reason: HOSPADM

## 2021-01-01 RX ORDER — ATORVASTATIN CALCIUM 40 MG/1
40 TABLET, FILM COATED ORAL NIGHTLY
Status: DISCONTINUED | OUTPATIENT
Start: 2021-01-01 | End: 2021-01-01 | Stop reason: HOSPADM

## 2021-01-01 RX ORDER — POTASSIUM CHLORIDE 1.5 G/1.77G
40 POWDER, FOR SOLUTION ORAL AS NEEDED
Status: DISCONTINUED | OUTPATIENT
Start: 2021-01-01 | End: 2021-01-01 | Stop reason: HOSPADM

## 2021-01-01 RX ORDER — FUROSEMIDE 10 MG/ML
60 INJECTION INTRAMUSCULAR; INTRAVENOUS
Status: DISCONTINUED | OUTPATIENT
Start: 2021-01-01 | End: 2021-01-01

## 2021-01-01 RX ORDER — FERROUS SULFATE TAB EC 324 MG (65 MG FE EQUIVALENT) 324 (65 FE) MG
324 TABLET DELAYED RESPONSE ORAL
Status: DISCONTINUED | OUTPATIENT
Start: 2021-01-01 | End: 2021-01-01 | Stop reason: HOSPADM

## 2021-01-01 RX ORDER — MEROPENEM AND SODIUM CHLORIDE 500 MG/50ML
500 INJECTION, SOLUTION INTRAVENOUS EVERY 12 HOURS
Status: DISCONTINUED | OUTPATIENT
Start: 2021-01-01 | End: 2021-01-01 | Stop reason: HOSPADM

## 2021-01-01 RX ORDER — DEXTROSE MONOHYDRATE 50 MG/ML
100 INJECTION, SOLUTION INTRAVENOUS CONTINUOUS
Status: DISCONTINUED | OUTPATIENT
Start: 2021-01-01 | End: 2021-01-01

## 2021-01-01 RX ORDER — ISOSORBIDE MONONITRATE 30 MG/1
30 TABLET, EXTENDED RELEASE ORAL
Status: DISCONTINUED | OUTPATIENT
Start: 2021-01-01 | End: 2021-01-01 | Stop reason: HOSPADM

## 2021-01-01 RX ORDER — IPRATROPIUM BROMIDE AND ALBUTEROL SULFATE 2.5; .5 MG/3ML; MG/3ML
3 SOLUTION RESPIRATORY (INHALATION)
Qty: 360 ML
Start: 2021-01-01

## 2021-01-01 RX ORDER — DULOXETIN HYDROCHLORIDE 30 MG/1
60 CAPSULE, DELAYED RELEASE ORAL DAILY
Status: DISCONTINUED | OUTPATIENT
Start: 2021-01-01 | End: 2021-01-01 | Stop reason: CLARIF

## 2021-01-01 RX ORDER — GABAPENTIN 300 MG/1
300 CAPSULE ORAL EVERY 12 HOURS SCHEDULED
Status: DISCONTINUED | OUTPATIENT
Start: 2021-01-01 | End: 2021-01-01 | Stop reason: HOSPADM

## 2021-01-01 RX ORDER — METOPROLOL TARTRATE 5 MG/5ML
5 INJECTION INTRAVENOUS ONCE
Status: COMPLETED | OUTPATIENT
Start: 2021-01-01 | End: 2021-01-01

## 2021-01-01 RX ORDER — MEROPENEM AND SODIUM CHLORIDE 1 G/50ML
1 INJECTION, SOLUTION INTRAVENOUS EVERY 12 HOURS
Status: DISCONTINUED | OUTPATIENT
Start: 2021-01-01 | End: 2021-01-01

## 2021-01-01 RX ORDER — ACETAMINOPHEN 325 MG/1
650 TABLET ORAL EVERY 4 HOURS PRN
Status: DISCONTINUED | OUTPATIENT
Start: 2021-01-01 | End: 2021-01-01 | Stop reason: HOSPADM

## 2021-01-01 RX ORDER — ISOSORBIDE MONONITRATE 30 MG/1
30 TABLET, EXTENDED RELEASE ORAL
Status: DISCONTINUED | OUTPATIENT
Start: 2021-01-01 | End: 2021-01-01 | Stop reason: ALTCHOICE

## 2021-01-01 RX ORDER — MAGNESIUM SULFATE HEPTAHYDRATE 40 MG/ML
2 INJECTION, SOLUTION INTRAVENOUS AS NEEDED
Status: DISCONTINUED | OUTPATIENT
Start: 2021-01-01 | End: 2021-01-01 | Stop reason: HOSPADM

## 2021-01-01 RX ORDER — AMOXICILLIN 250 MG
1 CAPSULE ORAL 2 TIMES DAILY PRN
Start: 2021-01-01

## 2021-01-01 RX ORDER — MULTIVIT WITH IRON,MINERALS
15 LIQUID (ML) ORAL DAILY
Status: DISCONTINUED | OUTPATIENT
Start: 2021-01-01 | End: 2021-01-01 | Stop reason: HOSPADM

## 2021-01-01 RX ORDER — AMLODIPINE BESYLATE 5 MG/1
10 TABLET ORAL
Status: DISCONTINUED | OUTPATIENT
Start: 2021-01-01 | End: 2021-01-01

## 2021-01-01 RX ORDER — IPRATROPIUM BROMIDE AND ALBUTEROL SULFATE 2.5; .5 MG/3ML; MG/3ML
3 SOLUTION RESPIRATORY (INHALATION)
Status: DISCONTINUED | OUTPATIENT
Start: 2021-01-01 | End: 2021-01-01 | Stop reason: HOSPADM

## 2021-01-01 RX ORDER — SODIUM CHLORIDE 0.9 % (FLUSH) 0.9 %
3-10 SYRINGE (ML) INJECTION AS NEEDED
Status: DISCONTINUED | OUTPATIENT
Start: 2021-01-01 | End: 2021-01-01 | Stop reason: HOSPADM

## 2021-01-01 RX ORDER — IPRATROPIUM BROMIDE AND ALBUTEROL SULFATE 2.5; .5 MG/3ML; MG/3ML
3 SOLUTION RESPIRATORY (INHALATION)
Status: DISCONTINUED | OUTPATIENT
Start: 2021-01-01 | End: 2021-01-01

## 2021-01-01 RX ORDER — GABAPENTIN 300 MG/1
300 CAPSULE ORAL EVERY 12 HOURS SCHEDULED
Qty: 60 CAPSULE | Refills: 0 | Status: SHIPPED | OUTPATIENT
Start: 2021-01-01 | End: 2021-01-01 | Stop reason: SDUPTHER

## 2021-01-01 RX ORDER — DEXTROSE MONOHYDRATE 50 MG/ML
50 INJECTION, SOLUTION INTRAVENOUS CONTINUOUS
Status: DISCONTINUED | OUTPATIENT
Start: 2021-01-01 | End: 2021-01-01

## 2021-01-01 RX ORDER — POTASSIUM CHLORIDE 7.45 MG/ML
10 INJECTION INTRAVENOUS
Status: COMPLETED | OUTPATIENT
Start: 2021-01-01 | End: 2021-01-01

## 2021-01-01 RX ORDER — QUETIAPINE FUMARATE 25 MG/1
25 TABLET, FILM COATED ORAL NIGHTLY
Start: 2021-01-01

## 2021-01-01 RX ORDER — LORAZEPAM 0.5 MG/1
0.5 TABLET ORAL EVERY 6 HOURS PRN
Status: DISCONTINUED | OUTPATIENT
Start: 2021-01-01 | End: 2021-01-01 | Stop reason: HOSPADM

## 2021-01-01 RX ORDER — FERROUS SULFATE 300 MG/5ML
300 LIQUID (ML) ORAL
Start: 2021-01-01

## 2021-01-01 RX ORDER — DILTIAZEM HYDROCHLORIDE 60 MG/1
30 TABLET, FILM COATED ORAL EVERY 6 HOURS SCHEDULED
Status: DISCONTINUED | OUTPATIENT
Start: 2021-01-01 | End: 2021-01-01 | Stop reason: HOSPADM

## 2021-01-01 RX ORDER — FERROUS SULFATE TAB EC 324 MG (65 MG FE EQUIVALENT) 324 (65 FE) MG
324 TABLET DELAYED RESPONSE ORAL
Status: DISCONTINUED | OUTPATIENT
Start: 2021-01-01 | End: 2021-01-01 | Stop reason: ALTCHOICE

## 2021-01-01 RX ORDER — GABAPENTIN 300 MG/1
300 CAPSULE ORAL EVERY 12 HOURS SCHEDULED
Qty: 60 CAPSULE | Refills: 0 | Status: SHIPPED | OUTPATIENT
Start: 2021-01-01

## 2021-01-01 RX ORDER — POTASSIUM CHLORIDE, DEXTROSE MONOHYDRATE 150; 5 MG/100ML; G/100ML
100 INJECTION, SOLUTION INTRAVENOUS CONTINUOUS
Status: DISCONTINUED | OUTPATIENT
Start: 2021-01-01 | End: 2021-01-01 | Stop reason: HOSPADM

## 2021-01-01 RX ORDER — METOPROLOL TARTRATE 5 MG/5ML
2.5 INJECTION INTRAVENOUS EVERY 4 HOURS PRN
Status: DISCONTINUED | OUTPATIENT
Start: 2021-01-01 | End: 2021-01-01 | Stop reason: HOSPADM

## 2021-01-01 RX ORDER — BISACODYL 10 MG
10 SUPPOSITORY, RECTAL RECTAL DAILY PRN
Status: DISCONTINUED | OUTPATIENT
Start: 2021-01-01 | End: 2021-01-01 | Stop reason: HOSPADM

## 2021-01-01 RX ORDER — POTASSIUM CHLORIDE 20 MEQ/1
20 TABLET, EXTENDED RELEASE ORAL DAILY
COMMUNITY
End: 2021-01-01 | Stop reason: HOSPADM

## 2021-01-01 RX ORDER — LORAZEPAM 2 MG/ML
0.5 INJECTION INTRAMUSCULAR ONCE
Status: DISCONTINUED | OUTPATIENT
Start: 2021-01-01 | End: 2021-01-01

## 2021-01-01 RX ORDER — AMINO ACIDS/PROTEIN HYDROLYS 15G-100/30
30 LIQUID (ML) ORAL 2 TIMES DAILY
Start: 2021-01-01

## 2021-01-01 RX ORDER — MULTIPLE VITAMINS W/ MINERALS TAB 9MG-400MCG
1 TAB ORAL DAILY
Status: DISCONTINUED | OUTPATIENT
Start: 2021-01-01 | End: 2021-01-01 | Stop reason: CLARIF

## 2021-01-01 RX ORDER — METOPROLOL SUCCINATE 25 MG/1
25 TABLET, EXTENDED RELEASE ORAL
Status: DISCONTINUED | OUTPATIENT
Start: 2021-01-01 | End: 2021-01-01

## 2021-01-01 RX ORDER — DEXTROSE, SODIUM CHLORIDE, AND POTASSIUM CHLORIDE 5; .45; .15 G/100ML; G/100ML; G/100ML
50 INJECTION INTRAVENOUS CONTINUOUS
Status: DISCONTINUED | OUTPATIENT
Start: 2021-01-01 | End: 2021-01-01

## 2021-01-01 RX ORDER — QUETIAPINE FUMARATE 25 MG/1
25 TABLET, FILM COATED ORAL NIGHTLY
Status: DISCONTINUED | OUTPATIENT
Start: 2021-01-01 | End: 2021-01-01 | Stop reason: HOSPADM

## 2021-01-01 RX ORDER — POTASSIUM CHLORIDE 1.5 G/1.77G
40 POWDER, FOR SOLUTION ORAL EVERY 4 HOURS
Status: DISCONTINUED | OUTPATIENT
Start: 2021-01-01 | End: 2021-01-01 | Stop reason: CLARIF

## 2021-01-01 RX ORDER — DEXAMETHASONE SODIUM PHOSPHATE 4 MG/ML
6 INJECTION, SOLUTION INTRA-ARTICULAR; INTRALESIONAL; INTRAMUSCULAR; INTRAVENOUS; SOFT TISSUE ONCE
Status: COMPLETED | OUTPATIENT
Start: 2021-01-01 | End: 2021-01-01

## 2021-01-01 RX ORDER — MULTIPLE VITAMINS W/ MINERALS TAB 9MG-400MCG
1 TAB ORAL DAILY
COMMUNITY
End: 2021-01-01 | Stop reason: HOSPADM

## 2021-01-01 RX ORDER — DULOXETIN HYDROCHLORIDE 60 MG/1
60 CAPSULE, DELAYED RELEASE ORAL DAILY
COMMUNITY
End: 2021-01-01 | Stop reason: HOSPADM

## 2021-01-01 RX ORDER — SODIUM CHLORIDE 0.9 % (FLUSH) 0.9 %
3 SYRINGE (ML) INJECTION EVERY 12 HOURS SCHEDULED
Status: DISCONTINUED | OUTPATIENT
Start: 2021-01-01 | End: 2021-01-01 | Stop reason: HOSPADM

## 2021-01-01 RX ORDER — FERROUS SULFATE 300 MG/5ML
300 LIQUID (ML) ORAL
Status: DISCONTINUED | OUTPATIENT
Start: 2021-01-01 | End: 2021-01-01 | Stop reason: HOSPADM

## 2021-01-01 RX ADMIN — CLOPIDOGREL BISULFATE 75 MG: 75 TABLET ORAL at 12:36

## 2021-01-01 RX ADMIN — IPRATROPIUM BROMIDE AND ALBUTEROL SULFATE 3 ML: .5; 3 SOLUTION RESPIRATORY (INHALATION) at 14:57

## 2021-01-01 RX ADMIN — DONEPEZIL HYDROCHLORIDE 10 MG: 5 TABLET, FILM COATED ORAL at 20:17

## 2021-01-01 RX ADMIN — ISOSORBIDE DINITRATE 10 MG: 20 TABLET ORAL at 17:35

## 2021-01-01 RX ADMIN — ISOSORBIDE DINITRATE 10 MG: 20 TABLET ORAL at 17:24

## 2021-01-01 RX ADMIN — DONEPEZIL HYDROCHLORIDE 10 MG: 5 TABLET ORAL at 20:07

## 2021-01-01 RX ADMIN — ISOSORBIDE DINITRATE 10 MG: 20 TABLET ORAL at 10:20

## 2021-01-01 RX ADMIN — AZTREONAM 2 G: 2 INJECTION, POWDER, LYOPHILIZED, FOR SOLUTION INTRAMUSCULAR; INTRAVENOUS at 20:41

## 2021-01-01 RX ADMIN — APIXABAN 2.5 MG: 2.5 TABLET, FILM COATED ORAL at 20:06

## 2021-01-01 RX ADMIN — MEMANTINE 10 MG: 5 TABLET ORAL at 08:24

## 2021-01-01 RX ADMIN — APIXABAN 2.5 MG: 2.5 TABLET, FILM COATED ORAL at 08:24

## 2021-01-01 RX ADMIN — Medication 30 ML: at 20:07

## 2021-01-01 RX ADMIN — GABAPENTIN 300 MG: 300 CAPSULE ORAL at 20:08

## 2021-01-01 RX ADMIN — METOPROLOL SUCCINATE 25 MG: 25 TABLET, EXTENDED RELEASE ORAL at 14:34

## 2021-01-01 RX ADMIN — ISOSORBIDE DINITRATE 10 MG: 20 TABLET ORAL at 12:30

## 2021-01-01 RX ADMIN — ATORVASTATIN CALCIUM 40 MG: 40 TABLET, FILM COATED ORAL at 20:00

## 2021-01-01 RX ADMIN — AMLODIPINE BESYLATE 10 MG: 5 TABLET ORAL at 08:10

## 2021-01-01 RX ADMIN — SODIUM CHLORIDE, PRESERVATIVE FREE 10 ML: 5 INJECTION INTRAVENOUS at 08:20

## 2021-01-01 RX ADMIN — DEXTROSE MONOHYDRATE 50 ML/HR: 50 INJECTION, SOLUTION INTRAVENOUS at 09:27

## 2021-01-01 RX ADMIN — Medication 30 ML: at 08:20

## 2021-01-01 RX ADMIN — AZTREONAM 2 G: 2 INJECTION, POWDER, LYOPHILIZED, FOR SOLUTION INTRAMUSCULAR; INTRAVENOUS at 04:35

## 2021-01-01 RX ADMIN — DEXAMETHASONE 6 MG: 4 TABLET ORAL at 08:31

## 2021-01-01 RX ADMIN — Medication 30 ML: at 20:41

## 2021-01-01 RX ADMIN — GUAIFENESIN 600 MG: 600 TABLET, EXTENDED RELEASE ORAL at 20:00

## 2021-01-01 RX ADMIN — AZTREONAM 2 G: 2 INJECTION, POWDER, LYOPHILIZED, FOR SOLUTION INTRAMUSCULAR; INTRAVENOUS at 03:30

## 2021-01-01 RX ADMIN — SODIUM CHLORIDE, PRESERVATIVE FREE 3 ML: 5 INJECTION INTRAVENOUS at 19:10

## 2021-01-01 RX ADMIN — SODIUM CHLORIDE, PRESERVATIVE FREE 10 ML: 5 INJECTION INTRAVENOUS at 20:07

## 2021-01-01 RX ADMIN — AZTREONAM 2 G: 2 INJECTION, POWDER, LYOPHILIZED, FOR SOLUTION INTRAMUSCULAR; INTRAVENOUS at 20:19

## 2021-01-01 RX ADMIN — SODIUM CHLORIDE, PRESERVATIVE FREE 10 ML: 5 INJECTION INTRAVENOUS at 22:36

## 2021-01-01 RX ADMIN — FUROSEMIDE 40 MG: 10 INJECTION, SOLUTION INTRAMUSCULAR; INTRAVENOUS at 17:24

## 2021-01-01 RX ADMIN — POTASSIUM CHLORIDE 10 MEQ: 7.46 INJECTION, SOLUTION INTRAVENOUS at 09:36

## 2021-01-01 RX ADMIN — ISOSORBIDE DINITRATE 10 MG: 20 TABLET ORAL at 13:39

## 2021-01-01 RX ADMIN — GABAPENTIN 300 MG: 300 CAPSULE ORAL at 12:35

## 2021-01-01 RX ADMIN — IPRATROPIUM BROMIDE AND ALBUTEROL SULFATE 3 ML: .5; 3 SOLUTION RESPIRATORY (INHALATION) at 07:08

## 2021-01-01 RX ADMIN — DEXAMETHASONE 6 MG: 4 TABLET ORAL at 08:24

## 2021-01-01 RX ADMIN — ATORVASTATIN CALCIUM 40 MG: 40 TABLET, FILM COATED ORAL at 20:17

## 2021-01-01 RX ADMIN — APIXABAN 2.5 MG: 2.5 TABLET, FILM COATED ORAL at 08:19

## 2021-01-01 RX ADMIN — POTASSIUM CHLORIDE 40 MEQ: 10 CAPSULE, COATED, EXTENDED RELEASE ORAL at 12:29

## 2021-01-01 RX ADMIN — GABAPENTIN 300 MG: 300 CAPSULE ORAL at 20:07

## 2021-01-01 RX ADMIN — IPRATROPIUM BROMIDE AND ALBUTEROL SULFATE 3 ML: .5; 3 SOLUTION RESPIRATORY (INHALATION) at 12:55

## 2021-01-01 RX ADMIN — GABAPENTIN 300 MG: 300 CAPSULE ORAL at 20:16

## 2021-01-01 RX ADMIN — ISOSORBIDE DINITRATE 10 MG: 20 TABLET ORAL at 12:35

## 2021-01-01 RX ADMIN — CLOPIDOGREL BISULFATE 75 MG: 75 TABLET ORAL at 08:32

## 2021-01-01 RX ADMIN — SODIUM CHLORIDE, PRESERVATIVE FREE 3 ML: 5 INJECTION INTRAVENOUS at 08:24

## 2021-01-01 RX ADMIN — GABAPENTIN 300 MG: 300 CAPSULE ORAL at 21:45

## 2021-01-01 RX ADMIN — ACETAMINOPHEN 650 MG: 650 SUPPOSITORY RECTAL at 01:35

## 2021-01-01 RX ADMIN — GUAIFENESIN 600 MG: 600 TABLET, EXTENDED RELEASE ORAL at 20:40

## 2021-01-01 RX ADMIN — MEROPENEM AND SODIUM CHLORIDE 1 G: 1 INJECTION, SOLUTION INTRAVENOUS at 05:18

## 2021-01-01 RX ADMIN — DOXYCYCLINE 100 MG: 100 INJECTION, POWDER, LYOPHILIZED, FOR SOLUTION INTRAVENOUS at 09:29

## 2021-01-01 RX ADMIN — ISOSORBIDE DINITRATE 10 MG: 20 TABLET ORAL at 07:49

## 2021-01-01 RX ADMIN — IPRATROPIUM BROMIDE AND ALBUTEROL SULFATE 3 ML: .5; 3 SOLUTION RESPIRATORY (INHALATION) at 19:27

## 2021-01-01 RX ADMIN — DILTIAZEM HYDROCHLORIDE 5 MG/HR: 100 INJECTION, POWDER, LYOPHILIZED, FOR SOLUTION INTRAVENOUS at 17:14

## 2021-01-01 RX ADMIN — DILTIAZEM HYDROCHLORIDE 10 MG/HR: 100 INJECTION, POWDER, LYOPHILIZED, FOR SOLUTION INTRAVENOUS at 19:06

## 2021-01-01 RX ADMIN — POTASSIUM CHLORIDE 10 MEQ: 7.46 INJECTION, SOLUTION INTRAVENOUS at 01:00

## 2021-01-01 RX ADMIN — DILTIAZEM HYDROCHLORIDE 15 MG/HR: 100 INJECTION, POWDER, LYOPHILIZED, FOR SOLUTION INTRAVENOUS at 15:28

## 2021-01-01 RX ADMIN — ISOSORBIDE DINITRATE 10 MG: 20 TABLET ORAL at 18:02

## 2021-01-01 RX ADMIN — ISOSORBIDE MONONITRATE 30 MG: 30 TABLET, EXTENDED RELEASE ORAL at 08:25

## 2021-01-01 RX ADMIN — SODIUM CHLORIDE, PRESERVATIVE FREE 3 ML: 5 INJECTION INTRAVENOUS at 20:17

## 2021-01-01 RX ADMIN — SODIUM CHLORIDE, PRESERVATIVE FREE 10 ML: 5 INJECTION INTRAVENOUS at 21:00

## 2021-01-01 RX ADMIN — AZTREONAM 2 G: 2 INJECTION, POWDER, LYOPHILIZED, FOR SOLUTION INTRAMUSCULAR; INTRAVENOUS at 20:02

## 2021-01-01 RX ADMIN — CLOPIDOGREL BISULFATE 75 MG: 75 TABLET ORAL at 08:19

## 2021-01-01 RX ADMIN — METOPROLOL TARTRATE 5 MG: 1 INJECTION, SOLUTION INTRAVENOUS at 22:23

## 2021-01-01 RX ADMIN — DILTIAZEM HYDROCHLORIDE 15 MG/HR: 100 INJECTION, POWDER, LYOPHILIZED, FOR SOLUTION INTRAVENOUS at 08:38

## 2021-01-01 RX ADMIN — DEXAMETHASONE 6 MG: 4 TABLET ORAL at 09:32

## 2021-01-01 RX ADMIN — GUAIFENESIN 600 MG: 600 TABLET, EXTENDED RELEASE ORAL at 08:12

## 2021-01-01 RX ADMIN — MEMANTINE 10 MG: 5 TABLET ORAL at 20:04

## 2021-01-01 RX ADMIN — ISOSORBIDE DINITRATE 10 MG: 20 TABLET ORAL at 08:42

## 2021-01-01 RX ADMIN — FUROSEMIDE 80 MG: 10 INJECTION, SOLUTION INTRAMUSCULAR; INTRAVENOUS at 20:12

## 2021-01-01 RX ADMIN — APIXABAN 2.5 MG: 2.5 TABLET, FILM COATED ORAL at 20:16

## 2021-01-01 RX ADMIN — POTASSIUM CHLORIDE 10 MEQ: 10 INJECTION, SOLUTION INTRAVENOUS at 08:44

## 2021-01-01 RX ADMIN — IPRATROPIUM BROMIDE AND ALBUTEROL SULFATE 3 ML: .5; 3 SOLUTION RESPIRATORY (INHALATION) at 07:04

## 2021-01-01 RX ADMIN — ENOXAPARIN SODIUM 60 MG: 60 INJECTION SUBCUTANEOUS at 18:02

## 2021-01-01 RX ADMIN — GABAPENTIN 300 MG: 300 CAPSULE ORAL at 20:20

## 2021-01-01 RX ADMIN — DILTIAZEM HYDROCHLORIDE 10 MG: 5 INJECTION INTRAVENOUS at 16:21

## 2021-01-01 RX ADMIN — GUAIFENESIN 200 MG: 200 SOLUTION ORAL at 20:20

## 2021-01-01 RX ADMIN — GABAPENTIN 300 MG: 300 CAPSULE ORAL at 20:40

## 2021-01-01 RX ADMIN — IPRATROPIUM BROMIDE AND ALBUTEROL SULFATE 3 ML: .5; 3 SOLUTION RESPIRATORY (INHALATION) at 12:11

## 2021-01-01 RX ADMIN — DONEPEZIL HYDROCHLORIDE 10 MG: 5 TABLET, FILM COATED ORAL at 21:45

## 2021-01-01 RX ADMIN — FUROSEMIDE 40 MG: 10 INJECTION, SOLUTION INTRAMUSCULAR; INTRAVENOUS at 12:37

## 2021-01-01 RX ADMIN — METOPROLOL TARTRATE 5 MG: 1 INJECTION, SOLUTION INTRAVENOUS at 15:23

## 2021-01-01 RX ADMIN — SODIUM CHLORIDE, PRESERVATIVE FREE 10 ML: 5 INJECTION INTRAVENOUS at 09:00

## 2021-01-01 RX ADMIN — AZTREONAM 2 G: 2 INJECTION, POWDER, LYOPHILIZED, FOR SOLUTION INTRAMUSCULAR; INTRAVENOUS at 12:37

## 2021-01-01 RX ADMIN — AZTREONAM 2 G: 2 INJECTION, POWDER, LYOPHILIZED, FOR SOLUTION INTRAMUSCULAR; INTRAVENOUS at 12:29

## 2021-01-01 RX ADMIN — FERROUS SULFATE TAB EC 324 MG (65 MG FE EQUIVALENT) 324 MG: 324 (65 FE) TABLET DELAYED RESPONSE at 08:24

## 2021-01-01 RX ADMIN — AZTREONAM 2 G: 2 INJECTION, POWDER, LYOPHILIZED, FOR SOLUTION INTRAMUSCULAR; INTRAVENOUS at 11:33

## 2021-01-01 RX ADMIN — DONEPEZIL HYDROCHLORIDE 10 MG: 5 TABLET ORAL at 20:08

## 2021-01-01 RX ADMIN — GABAPENTIN 300 MG: 300 CAPSULE ORAL at 08:24

## 2021-01-01 RX ADMIN — IPRATROPIUM BROMIDE AND ALBUTEROL SULFATE 3 ML: .5; 3 SOLUTION RESPIRATORY (INHALATION) at 00:24

## 2021-01-01 RX ADMIN — POTASSIUM CHLORIDE 10 MEQ: 10 INJECTION, SOLUTION INTRAVENOUS at 17:42

## 2021-01-01 RX ADMIN — MEMANTINE 10 MG: 5 TABLET ORAL at 08:42

## 2021-01-01 RX ADMIN — DOXYCYCLINE 100 MG: 100 INJECTION, POWDER, LYOPHILIZED, FOR SOLUTION INTRAVENOUS at 09:57

## 2021-01-01 RX ADMIN — IPRATROPIUM BROMIDE AND ALBUTEROL SULFATE 3 ML: .5; 3 SOLUTION RESPIRATORY (INHALATION) at 13:18

## 2021-01-01 RX ADMIN — ISOSORBIDE DINITRATE 10 MG: 20 TABLET ORAL at 17:41

## 2021-01-01 RX ADMIN — MEMANTINE 10 MG: 5 TABLET ORAL at 20:20

## 2021-01-01 RX ADMIN — MEMANTINE 10 MG: 5 TABLET ORAL at 08:31

## 2021-01-01 RX ADMIN — DULOXETINE HYDROCHLORIDE 60 MG: 20 CAPSULE, DELAYED RELEASE ORAL at 10:27

## 2021-01-01 RX ADMIN — AMLODIPINE BESYLATE 10 MG: 5 TABLET ORAL at 08:32

## 2021-01-01 RX ADMIN — AZTREONAM 2 G: 2 INJECTION, POWDER, LYOPHILIZED, FOR SOLUTION INTRAMUSCULAR; INTRAVENOUS at 03:58

## 2021-01-01 RX ADMIN — FUROSEMIDE 40 MG: 10 INJECTION, SOLUTION INTRAMUSCULAR; INTRAVENOUS at 08:19

## 2021-01-01 RX ADMIN — AZTREONAM 2 G: 2 INJECTION, POWDER, LYOPHILIZED, FOR SOLUTION INTRAMUSCULAR; INTRAVENOUS at 20:00

## 2021-01-01 RX ADMIN — GABAPENTIN 300 MG: 300 CAPSULE ORAL at 08:11

## 2021-01-01 RX ADMIN — DEXAMETHASONE SODIUM PHOSPHATE 6 MG: 4 INJECTION, SOLUTION INTRAMUSCULAR; INTRAVENOUS at 19:10

## 2021-01-01 RX ADMIN — AMLODIPINE BESYLATE 10 MG: 5 TABLET ORAL at 09:32

## 2021-01-01 RX ADMIN — POTASSIUM CHLORIDE 10 MEQ: 7.46 INJECTION, SOLUTION INTRAVENOUS at 23:59

## 2021-01-01 RX ADMIN — DONEPEZIL HYDROCHLORIDE 10 MG: 5 TABLET ORAL at 22:35

## 2021-01-01 RX ADMIN — SODIUM CHLORIDE, PRESERVATIVE FREE 3 ML: 5 INJECTION INTRAVENOUS at 21:48

## 2021-01-01 RX ADMIN — SODIUM CHLORIDE 100 ML/HR: 9 INJECTION, SOLUTION INTRAVENOUS at 17:00

## 2021-01-01 RX ADMIN — SODIUM CHLORIDE, PRESERVATIVE FREE 10 ML: 5 INJECTION INTRAVENOUS at 08:51

## 2021-01-01 RX ADMIN — MEROPENEM AND SODIUM CHLORIDE 1 G: 1 INJECTION, SOLUTION INTRAVENOUS at 18:53

## 2021-01-01 RX ADMIN — APIXABAN 2.5 MG: 2.5 TABLET, FILM COATED ORAL at 12:36

## 2021-01-01 RX ADMIN — GUAIFENESIN 200 MG: 200 SOLUTION ORAL at 17:24

## 2021-01-01 RX ADMIN — POTASSIUM CHLORIDE 10 MEQ: 7.46 INJECTION, SOLUTION INTRAVENOUS at 11:51

## 2021-01-01 RX ADMIN — AMLODIPINE BESYLATE 5 MG: 5 TABLET ORAL at 08:43

## 2021-01-01 RX ADMIN — FERROUS SULFATE TAB EC 324 MG (65 MG FE EQUIVALENT) 324 MG: 324 (65 FE) TABLET DELAYED RESPONSE at 08:32

## 2021-01-01 RX ADMIN — DOXYCYCLINE 100 MG: 100 INJECTION, POWDER, LYOPHILIZED, FOR SOLUTION INTRAVENOUS at 21:48

## 2021-01-01 RX ADMIN — SODIUM CHLORIDE, PRESERVATIVE FREE 10 ML: 5 INJECTION INTRAVENOUS at 09:29

## 2021-01-01 RX ADMIN — APIXABAN 2.5 MG: 2.5 TABLET, FILM COATED ORAL at 20:08

## 2021-01-01 RX ADMIN — CLOPIDOGREL BISULFATE 75 MG: 75 TABLET ORAL at 09:32

## 2021-01-01 RX ADMIN — GUAIFENESIN 200 MG: 200 SOLUTION ORAL at 12:34

## 2021-01-01 RX ADMIN — POTASSIUM CHLORIDE 40 MEQ: 1.5 POWDER, FOR SOLUTION ORAL at 12:34

## 2021-01-01 RX ADMIN — DEXTROSE MONOHYDRATE 100 ML/HR: 50 INJECTION, SOLUTION INTRAVENOUS at 17:09

## 2021-01-01 RX ADMIN — IPRATROPIUM BROMIDE AND ALBUTEROL SULFATE 3 ML: .5; 3 SOLUTION RESPIRATORY (INHALATION) at 19:18

## 2021-01-01 RX ADMIN — ATORVASTATIN CALCIUM 40 MG: 40 TABLET, FILM COATED ORAL at 20:40

## 2021-01-01 RX ADMIN — IPRATROPIUM BROMIDE AND ALBUTEROL SULFATE 3 ML: .5; 3 SOLUTION RESPIRATORY (INHALATION) at 13:31

## 2021-01-01 RX ADMIN — AZITHROMYCIN 500 MG: 500 INJECTION, POWDER, LYOPHILIZED, FOR SOLUTION INTRAVENOUS at 22:35

## 2021-01-01 RX ADMIN — GABAPENTIN 300 MG: 300 CAPSULE ORAL at 08:31

## 2021-01-01 RX ADMIN — IPRATROPIUM BROMIDE AND ALBUTEROL SULFATE 3 ML: .5; 3 SOLUTION RESPIRATORY (INHALATION) at 18:30

## 2021-01-01 RX ADMIN — ATORVASTATIN CALCIUM 40 MG: 40 TABLET, FILM COATED ORAL at 20:06

## 2021-01-01 RX ADMIN — IPRATROPIUM BROMIDE AND ALBUTEROL SULFATE 3 ML: .5; 3 SOLUTION RESPIRATORY (INHALATION) at 06:41

## 2021-01-01 RX ADMIN — MEMANTINE 10 MG: 5 TABLET ORAL at 12:35

## 2021-01-01 RX ADMIN — MEROPENEM AND SODIUM CHLORIDE 1 G: 1 INJECTION, SOLUTION INTRAVENOUS at 06:22

## 2021-01-01 RX ADMIN — DONEPEZIL HYDROCHLORIDE 10 MG: 5 TABLET ORAL at 20:41

## 2021-01-01 RX ADMIN — POTASSIUM CHLORIDE AND DEXTROSE MONOHYDRATE 100 ML/HR: 150; 5 INJECTION, SOLUTION INTRAVENOUS at 10:05

## 2021-01-01 RX ADMIN — IPRATROPIUM BROMIDE AND ALBUTEROL SULFATE 3 ML: .5; 3 SOLUTION RESPIRATORY (INHALATION) at 19:29

## 2021-01-01 RX ADMIN — IPRATROPIUM BROMIDE AND ALBUTEROL SULFATE 3 ML: .5; 3 SOLUTION RESPIRATORY (INHALATION) at 12:14

## 2021-01-01 RX ADMIN — POTASSIUM CHLORIDE 10 MEQ: 7.46 INJECTION, SOLUTION INTRAVENOUS at 03:18

## 2021-01-01 RX ADMIN — DONEPEZIL HYDROCHLORIDE 10 MG: 5 TABLET ORAL at 20:00

## 2021-01-01 RX ADMIN — ISOSORBIDE DINITRATE 10 MG: 20 TABLET ORAL at 08:19

## 2021-01-01 RX ADMIN — APIXABAN 2.5 MG: 2.5 TABLET, FILM COATED ORAL at 08:43

## 2021-01-01 RX ADMIN — POTASSIUM CHLORIDE 40 MEQ: 10 CAPSULE, COATED, EXTENDED RELEASE ORAL at 17:24

## 2021-01-01 RX ADMIN — APIXABAN 2.5 MG: 2.5 TABLET, FILM COATED ORAL at 21:46

## 2021-01-01 RX ADMIN — APIXABAN 2.5 MG: 2.5 TABLET, FILM COATED ORAL at 09:32

## 2021-01-01 RX ADMIN — GUAIFENESIN 600 MG: 600 TABLET, EXTENDED RELEASE ORAL at 08:19

## 2021-01-01 RX ADMIN — MEMANTINE 10 MG: 5 TABLET ORAL at 21:46

## 2021-01-01 RX ADMIN — POTASSIUM CHLORIDE 10 MEQ: 7.46 INJECTION, SOLUTION INTRAVENOUS at 02:09

## 2021-01-01 RX ADMIN — SODIUM CHLORIDE, PRESERVATIVE FREE 3 ML: 5 INJECTION INTRAVENOUS at 09:29

## 2021-01-01 RX ADMIN — METOPROLOL TARTRATE 25 MG: 25 TABLET, FILM COATED ORAL at 10:23

## 2021-01-01 RX ADMIN — MEMANTINE 10 MG: 5 TABLET ORAL at 09:32

## 2021-01-01 RX ADMIN — DONEPEZIL HYDROCHLORIDE 10 MG: 5 TABLET ORAL at 20:20

## 2021-01-01 RX ADMIN — GABAPENTIN 300 MG: 300 CAPSULE ORAL at 21:46

## 2021-01-01 RX ADMIN — AZITHROMYCIN 500 MG: 500 INJECTION, POWDER, LYOPHILIZED, FOR SOLUTION INTRAVENOUS at 22:42

## 2021-01-01 RX ADMIN — DOXYCYCLINE 100 MG: 100 INJECTION, POWDER, LYOPHILIZED, FOR SOLUTION INTRAVENOUS at 08:30

## 2021-01-01 RX ADMIN — POTASSIUM CHLORIDE 10 MEQ: 7.46 INJECTION, SOLUTION INTRAVENOUS at 10:37

## 2021-01-01 RX ADMIN — IPRATROPIUM BROMIDE AND ALBUTEROL SULFATE 3 ML: .5; 3 SOLUTION RESPIRATORY (INHALATION) at 00:52

## 2021-01-01 RX ADMIN — MEMANTINE 10 MG: 5 TABLET ORAL at 20:08

## 2021-01-01 RX ADMIN — Medication 30 ML: at 08:13

## 2021-01-01 RX ADMIN — AZTREONAM 2 G: 1 INJECTION, POWDER, LYOPHILIZED, FOR SOLUTION INTRAMUSCULAR; INTRAVENOUS at 20:12

## 2021-01-01 RX ADMIN — APIXABAN 2.5 MG: 2.5 TABLET, FILM COATED ORAL at 20:00

## 2021-01-01 RX ADMIN — GABAPENTIN 300 MG: 300 CAPSULE ORAL at 10:25

## 2021-01-01 RX ADMIN — POTASSIUM CHLORIDE 10 MEQ: 10 INJECTION, SOLUTION INTRAVENOUS at 16:25

## 2021-01-01 RX ADMIN — CLOPIDOGREL BISULFATE 75 MG: 75 TABLET ORAL at 10:27

## 2021-01-01 RX ADMIN — MEMANTINE 10 MG: 5 TABLET ORAL at 20:17

## 2021-01-01 RX ADMIN — ISOSORBIDE DINITRATE 10 MG: 20 TABLET ORAL at 17:12

## 2021-01-01 RX ADMIN — ISOSORBIDE MONONITRATE 30 MG: 30 TABLET, EXTENDED RELEASE ORAL at 08:31

## 2021-01-01 RX ADMIN — ATORVASTATIN CALCIUM 40 MG: 40 TABLET, FILM COATED ORAL at 21:46

## 2021-01-01 RX ADMIN — IPRATROPIUM BROMIDE AND ALBUTEROL SULFATE 3 ML: .5; 3 SOLUTION RESPIRATORY (INHALATION) at 19:17

## 2021-01-01 RX ADMIN — CLOPIDOGREL BISULFATE 75 MG: 75 TABLET ORAL at 08:43

## 2021-01-01 RX ADMIN — IPRATROPIUM BROMIDE AND ALBUTEROL SULFATE 3 ML: .5; 3 SOLUTION RESPIRATORY (INHALATION) at 00:08

## 2021-01-01 RX ADMIN — AZTREONAM 2 G: 2 INJECTION, POWDER, LYOPHILIZED, FOR SOLUTION INTRAMUSCULAR; INTRAVENOUS at 04:37

## 2021-01-01 RX ADMIN — AMLODIPINE BESYLATE 5 MG: 5 TABLET ORAL at 08:24

## 2021-01-01 RX ADMIN — MEROPENEM AND SODIUM CHLORIDE 1 G: 1 INJECTION, SOLUTION INTRAVENOUS at 18:02

## 2021-01-01 RX ADMIN — DOXYCYCLINE 100 MG: 100 INJECTION, POWDER, LYOPHILIZED, FOR SOLUTION INTRAVENOUS at 20:16

## 2021-01-01 RX ADMIN — POTASSIUM CHLORIDE, DEXTROSE MONOHYDRATE AND SODIUM CHLORIDE 50 ML/HR: 150; 5; 450 INJECTION, SOLUTION INTRAVENOUS at 08:51

## 2021-01-01 RX ADMIN — POTASSIUM CHLORIDE 10 MEQ: 10 INJECTION, SOLUTION INTRAVENOUS at 13:04

## 2021-01-01 RX ADMIN — Medication 1 TABLET: at 08:43

## 2021-01-01 RX ADMIN — Medication 300 MG: at 12:41

## 2021-01-01 RX ADMIN — MEMANTINE 10 MG: 5 TABLET ORAL at 20:40

## 2021-01-01 RX ADMIN — AZTREONAM 2 G: 2 INJECTION, POWDER, LYOPHILIZED, FOR SOLUTION INTRAMUSCULAR; INTRAVENOUS at 04:01

## 2021-01-01 RX ADMIN — POTASSIUM CHLORIDE, DEXTROSE MONOHYDRATE AND SODIUM CHLORIDE 50 ML/HR: 150; 5; 450 INJECTION, SOLUTION INTRAVENOUS at 08:26

## 2021-01-01 RX ADMIN — MAGNESIUM SULFATE IN WATER 4 G: 40 INJECTION, SOLUTION INTRAVENOUS at 08:18

## 2021-01-01 RX ADMIN — AZTREONAM 2 G: 2 INJECTION, POWDER, LYOPHILIZED, FOR SOLUTION INTRAMUSCULAR; INTRAVENOUS at 11:51

## 2021-01-01 RX ADMIN — DONEPEZIL HYDROCHLORIDE 10 MG: 5 TABLET, FILM COATED ORAL at 21:46

## 2021-01-01 RX ADMIN — APIXABAN 2.5 MG: 2.5 TABLET, FILM COATED ORAL at 08:32

## 2021-01-01 RX ADMIN — MEMANTINE 10 MG: 5 TABLET ORAL at 20:00

## 2021-01-01 RX ADMIN — Medication 1 TABLET: at 08:11

## 2021-01-01 RX ADMIN — Medication 300 MG: at 08:43

## 2021-01-01 RX ADMIN — POTASSIUM CHLORIDE 10 MEQ: 10 INJECTION, SOLUTION INTRAVENOUS at 15:06

## 2021-01-01 RX ADMIN — GABAPENTIN 300 MG: 300 CAPSULE ORAL at 08:43

## 2021-01-01 RX ADMIN — Medication 30 ML: at 12:41

## 2021-01-01 RX ADMIN — POTASSIUM CHLORIDE 10 MEQ: 7.46 INJECTION, SOLUTION INTRAVENOUS at 08:25

## 2021-01-01 RX ADMIN — IPRATROPIUM BROMIDE AND ALBUTEROL SULFATE 3 ML: .5; 3 SOLUTION RESPIRATORY (INHALATION) at 13:00

## 2021-01-01 RX ADMIN — SODIUM CHLORIDE, PRESERVATIVE FREE 10 ML: 5 INJECTION INTRAVENOUS at 08:12

## 2021-01-01 RX ADMIN — ISOSORBIDE DINITRATE 10 MG: 20 TABLET ORAL at 14:33

## 2021-01-01 RX ADMIN — Medication 300 MG: at 07:49

## 2021-01-01 RX ADMIN — AZTREONAM 2 G: 2 INJECTION, POWDER, LYOPHILIZED, FOR SOLUTION INTRAMUSCULAR; INTRAVENOUS at 13:39

## 2021-01-01 RX ADMIN — DILTIAZEM HYDROCHLORIDE 30 MG: 60 TABLET, FILM COATED ORAL at 12:35

## 2021-01-01 RX ADMIN — Medication 300 MG: at 08:19

## 2021-01-01 RX ADMIN — SODIUM CHLORIDE, PRESERVATIVE FREE 3 ML: 5 INJECTION INTRAVENOUS at 17:00

## 2021-01-01 RX ADMIN — GUAIFENESIN 200 MG: 200 SOLUTION ORAL at 12:29

## 2021-01-01 RX ADMIN — IPRATROPIUM BROMIDE AND ALBUTEROL SULFATE 3 ML: .5; 3 SOLUTION RESPIRATORY (INHALATION) at 15:55

## 2021-01-01 RX ADMIN — AMLODIPINE BESYLATE 10 MG: 5 TABLET ORAL at 12:35

## 2021-01-01 RX ADMIN — APIXABAN 2.5 MG: 2.5 TABLET, FILM COATED ORAL at 20:40

## 2021-01-01 RX ADMIN — IPRATROPIUM BROMIDE AND ALBUTEROL SULFATE 3 ML: .5; 3 SOLUTION RESPIRATORY (INHALATION) at 06:31

## 2021-01-01 RX ADMIN — SODIUM CHLORIDE, PRESERVATIVE FREE 3 ML: 5 INJECTION INTRAVENOUS at 08:31

## 2021-01-01 RX ADMIN — SODIUM CHLORIDE, PRESERVATIVE FREE 10 ML: 5 INJECTION INTRAVENOUS at 20:00

## 2021-01-01 RX ADMIN — IPRATROPIUM BROMIDE AND ALBUTEROL SULFATE 3 ML: .5; 3 SOLUTION RESPIRATORY (INHALATION) at 00:05

## 2021-01-01 RX ADMIN — AMLODIPINE BESYLATE 10 MG: 5 TABLET ORAL at 08:19

## 2021-01-01 RX ADMIN — IPRATROPIUM BROMIDE AND ALBUTEROL SULFATE 3 ML: .5; 3 SOLUTION RESPIRATORY (INHALATION) at 07:03

## 2021-01-01 RX ADMIN — ATORVASTATIN CALCIUM 40 MG: 40 TABLET, FILM COATED ORAL at 20:20

## 2021-01-01 RX ADMIN — MEMANTINE 10 MG: 5 TABLET ORAL at 08:19

## 2021-01-01 RX ADMIN — SODIUM CHLORIDE, PRESERVATIVE FREE 10 ML: 5 INJECTION INTRAVENOUS at 08:45

## 2021-01-01 RX ADMIN — DULOXETINE HYDROCHLORIDE 60 MG: 30 CAPSULE, DELAYED RELEASE ORAL at 16:00

## 2021-01-01 RX ADMIN — DULOXETINE HYDROCHLORIDE 60 MG: 20 CAPSULE, DELAYED RELEASE ORAL at 12:30

## 2021-01-01 RX ADMIN — MEMANTINE 10 MG: 5 TABLET ORAL at 10:25

## 2021-01-01 RX ADMIN — GABAPENTIN 300 MG: 300 CAPSULE ORAL at 08:19

## 2021-01-01 RX ADMIN — DULOXETINE HYDROCHLORIDE 60 MG: 30 CAPSULE, DELAYED RELEASE ORAL at 08:11

## 2021-01-01 RX ADMIN — NITROGLYCERIN 1 INCH: 20 OINTMENT TOPICAL at 20:12

## 2021-01-01 RX ADMIN — MEMANTINE 10 MG: 5 TABLET ORAL at 08:11

## 2021-01-01 RX ADMIN — POTASSIUM CHLORIDE 10 MEQ: 10 INJECTION, SOLUTION INTRAVENOUS at 11:28

## 2021-01-01 RX ADMIN — Medication 1 TABLET: at 16:00

## 2021-01-01 RX ADMIN — ISOSORBIDE DINITRATE 10 MG: 20 TABLET ORAL at 13:30

## 2021-01-01 RX ADMIN — CLOPIDOGREL BISULFATE 75 MG: 75 TABLET ORAL at 08:24

## 2021-01-01 RX ADMIN — ATORVASTATIN CALCIUM 40 MG: 40 TABLET, FILM COATED ORAL at 20:08

## 2021-01-01 RX ADMIN — GABAPENTIN 300 MG: 300 CAPSULE ORAL at 09:32

## 2021-01-01 RX ADMIN — IPRATROPIUM BROMIDE AND ALBUTEROL SULFATE 3 ML: .5; 3 SOLUTION RESPIRATORY (INHALATION) at 07:00

## 2021-01-01 RX ADMIN — GABAPENTIN 300 MG: 300 CAPSULE ORAL at 20:00

## 2021-01-01 RX ADMIN — APIXABAN 2.5 MG: 2.5 TABLET, FILM COATED ORAL at 08:11

## 2021-01-01 RX ADMIN — FERROUS SULFATE TAB EC 324 MG (65 MG FE EQUIVALENT) 324 MG: 324 (65 FE) TABLET DELAYED RESPONSE at 09:31

## 2021-01-01 RX ADMIN — ISOSORBIDE MONONITRATE 30 MG: 30 TABLET, EXTENDED RELEASE ORAL at 09:31

## 2021-01-01 RX ADMIN — ENOXAPARIN SODIUM 60 MG: 60 INJECTION SUBCUTANEOUS at 18:53

## 2021-01-01 RX ADMIN — CLOPIDOGREL BISULFATE 75 MG: 75 TABLET ORAL at 08:11

## 2021-01-12 NOTE — PROGRESS NOTES
Nursing Home Progress Note        Tano Cevallos DO []  CUONG Bauer []  [address] [place] [address]  Phone: [phone]  Fax: [phone] Freida Alanis MD []  Woodrow Quiroz DO []  Alexandra Pate PA-C [x]   [address] [place] [address]  Phone: [phone]  Fax: [phone]     PATIENT NAME: Tammy Pascual                                                                          YOB: 1945           DATE OF SERVICE: 12/28/2020  FACILITY:  [x] [facility]   [] [facility]   [] [facility]   [] [facility]   [] Other ______________________________________________________________________     CHIEF COMPLAINT:  Bradycardia, lethargy      HISTORY OF PRESENT ILLNESS:   Ms. Pascual is a 74 y/o female who presents today with bradycardia.  Per nursing, patient has been sleeping more throughout the day, appearing fatigued.  Appetite has been poor.  She has noted bradycardia.  Currently on amiodarone.  Patient recently diagnosed with COVID 19 on 12/17.  Per nursing, no report of hypoxia, shortness of breath, fever.  Upon assessment she is lying in bed, awakens briefly to verbal stimuli.  Presents with no acute distress.    PAST MEDICAL & SURGICAL HISTORY:   Past Medical History:   Diagnosis Date   • Alzheimer's dementia (CMS/HCC) 04/07/2019   • Anemia    • Arthritis    • Breast cancer (CMS/HCC)     naveed mastectomy   • Cancer (CMS/HCC)     breast   • Congestive heart failure (CMS/HCC)    • Diabetes mellitus (CMS/HCC)     DIET CONTROLLED   • Disease of thyroid gland    • Hearing loss    • Heart disease    • Hyperlipidemia    • Hypertension    • Kidney disease    • Sleep apnea       Past Surgical History:   Procedure Laterality Date   • BACK SURGERY  2015   • BREAST BIOPSY  1998   • BREAST EXCISIONAL BIOPSY Left 02/08/1999   • BREAST RECONSTRUCTION Bilateral    • CAROTID ARTERY ANGIOPLASTY Right 2008    ([facility])   • CATARACT EXTRACTION Bilateral    • COLONOSCOPY N/A 8/17/2017     Procedure: COLONOSCOPY;  Surgeon: Sindy Clement MD;  Location: [facility] ENDOSCOPY;  Service:    • HYSTERECTOMY     • MASTECTOMY Bilateral 2010         MEDICATIONS:  I have reviewed and reconciled the patients medication list in the patients chart at the skilled nursing facility today.      ALLERGIES:  Allergies   Allergen Reactions   • Cephalosporins Anaphylaxis   • Quinolones Anaphylaxis   • Ciprofloxacin    • Keflex [Cephalexin] Hives         SOCIAL HISTORY:  Social History     Socioeconomic History   • Marital status: Single     Spouse name: Not on file   • Number of children: Not on file   • Years of education: Not on file   • Highest education level: Not on file   Tobacco Use   • Smoking status: Former Smoker     Quit date: 2003     Years since quittin.5   • Smokeless tobacco: Never Used   Substance and Sexual Activity   • Alcohol use: No     Frequency: Never   • Drug use: No   • Sexual activity: Not Currently       FAMILY HISTORY:  Family History   Problem Relation Age of Onset   • Cancer Mother         breast   • Diabetes Mother    • Heart disease Father    • Stroke Father    • Hypertension Father    • Breast cancer Maternal Grandmother    • Breast cancer Paternal Grandmother        REVIEW OF SYSTEMS:  Review of Systems   Unable to perform ROS: Mental status change        PHYSICAL EXAMINATION:     VITAL SIGNS:  /62   Pulse (!) 48   Temp 97.2 °F (36.2 °C)   Resp 16   SpO2 94%     Physical Exam  Vitals signs and nursing note reviewed.   Constitutional:       General: She is sleeping. She is not in acute distress.     Appearance: She is ill-appearing.      Comments: Frail appearing elderly female, NAD.     HENT:      Head: Normocephalic and atraumatic.      Right Ear: External ear normal.      Left Ear: External ear normal.      Mouth/Throat:      Mouth: Mucous membranes are moist.   Eyes:      Conjunctiva/sclera: Conjunctivae normal.      Pupils: Pupils are equal, round, and  reactive to light.   Neck:      Musculoskeletal: Normal range of motion and neck supple.   Cardiovascular:      Rate and Rhythm: Regular rhythm. Bradycardia present.      Pulses: Normal pulses.   Pulmonary:      Effort: Pulmonary effort is normal. No respiratory distress.      Breath sounds: Normal breath sounds.   Abdominal:      General: Bowel sounds are normal. There is no distension.      Palpations: Abdomen is soft.      Tenderness: There is no abdominal tenderness.   Musculoskeletal:         General: No swelling or tenderness.   Skin:     General: Skin is warm and dry.      Coloration: Skin is pale.   Neurological:      Mental Status: She is easily aroused. Mental status is at baseline.         RECORDS REVIEW:   N/A     ASSESSMENT     Diagnoses and all orders for this visit:    1. Bradycardia (Primary)    2. COVID-19 virus infection    3. Chronic kidney disease, stage IV (severe) (CMS/HCC)    4. Dementia of the Alzheimer's type with late onset without behavioral disturbance (CMS/HCC)    5. Paroxysmal atrial fibrillation (CMS/HCC)      PLAN  Patient has had symptoms of somnolence, poor appetite.  No report of any respiratory symptoms or hypoxia.  Heart rate significantly lower than usual.  She awakens easily to verbal stimuli, appears at baseline mentation.  Blood pressure is stable and reassuring.  Rule out electrolye imbalance, SUJIT, UTI, etc with CBC, CMP, UA.  Also obtain EKG.  Will give normal saline bolus then continue with IV hydration for the next 48 hours.  She has been on fluid restriction, according to staff has had very little intake.  Discontinue amiodarone.  Discussed plan with attending, who is in agreement.  Contacted her power of , Milton Pascual, who is also in agreement with plan.  Will monitor patient closely for any worsening signs or symptoms.  Discussed transport to hospital if appropriate, POA and agreement.  Nursing staff to contact myself or MD with any acute change in  condition.    [x]  Discussed Patient in detail with nursing/staff, addressed all needs today.     [x]  Plan of Care Reviewed   []  PT/OT Reviewed   []  Order Changes  []  Discharge Plans Reviewed  [x]  Advance Directive on file with Nursing Home.   [x]  POA on file with Nursing Home.    [x]  Code Status listed:  [x]  Full Code   []  DNR    I spent 38 minutes in direct patient care including face to face and floor time.        Alexandra Pate PA-C.  1/12/2021

## 2021-01-16 PROBLEM — J12.82 PNEUMONIA DUE TO COVID-19 VIRUS: Status: ACTIVE | Noted: 2021-01-01

## 2021-01-16 PROBLEM — R55 SYNCOPE: Status: ACTIVE | Noted: 2021-01-01

## 2021-01-16 PROBLEM — U07.1 PNEUMONIA DUE TO COVID-19 VIRUS: Status: ACTIVE | Noted: 2021-01-01

## 2021-01-16 NOTE — ED PROVIDER NOTES
"Subjective   75 year old female presenting with reported episode of unresponsiveness.  Patient has a history of dementia so history from her is limited, when asked why she is here she states \"beats the hell out of me\".  Per report from the nursing home patient was unresponsive for 2 to 3 minutes and then rapidly returned to baseline.  Nursing reports no other concerns.  Patient has no complaints.          Review of Systems   Unable to perform ROS: Dementia       Past Medical History:   Diagnosis Date   • Alzheimer's dementia (CMS/[facility]) 04/07/2019   • Anemia    • Arthritis    • Breast cancer (CMS/[facility])     naveed mastectomy   • Cancer (CMS/[facility])     breast   • Congestive heart failure (CMS/[facility])    • Diabetes mellitus (CMS/[facility])     DIET CONTROLLED   • Disease of thyroid gland    • Hearing loss    • Heart disease    • Hyperlipidemia    • Hypertension    • Kidney disease    • Sleep apnea        Allergies   Allergen Reactions   • Cephalosporins Anaphylaxis   • Quinolones Anaphylaxis   • Ciprofloxacin    • Keflex [Cephalexin] Hives       Past Surgical History:   Procedure Laterality Date   • BACK SURGERY  2015   • BREAST BIOPSY  1998   • BREAST EXCISIONAL BIOPSY Left 02/08/1999   • BREAST RECONSTRUCTION Bilateral    • CAROTID ARTERY ANGIOPLASTY Right 2008    ([facility])   • CATARACT EXTRACTION Bilateral    • COLONOSCOPY N/A 8/17/2017    Procedure: COLONOSCOPY;  Surgeon: Sindy Clement MD;  Location: [facility] ENDOSCOPY;  Service:    • HYSTERECTOMY     • MASTECTOMY Bilateral 2010       Family History   Problem Relation Age of Onset   • Cancer Mother         breast   • Diabetes Mother    • Heart disease Father    • Stroke Father    • Hypertension Father    • Breast cancer Maternal Grandmother    • Breast cancer Paternal Grandmother        Social History     Socioeconomic History   • Marital status: Single     Spouse name: Not on file   • Number of children: Not on file   • Years of education: Not on file   • " Highest education level: Not on file   Tobacco Use   • Smoking status: Former Smoker     Quit date: 2003     Years since quittin.5   • Smokeless tobacco: Never Used   Substance and Sexual Activity   • Alcohol use: No     Frequency: Never   • Drug use: No   • Sexual activity: Not Currently           Objective   Physical Exam  Constitutional:       General: She is not in acute distress.     Appearance: Normal appearance. She is not ill-appearing, toxic-appearing or diaphoretic.   HENT:      Head: Normocephalic and atraumatic.      Right Ear: External ear normal.      Left Ear: External ear normal.      Nose: Nose normal.      Mouth/Throat:      Mouth: Mucous membranes are moist.      Pharynx: Oropharynx is clear.   Eyes:      Extraocular Movements: Extraocular movements intact.      Conjunctiva/sclera: Conjunctivae normal.      Pupils: Pupils are equal, round, and reactive to light.   Neck:      Musculoskeletal: Normal range of motion.   Cardiovascular:      Rate and Rhythm: Normal rate and regular rhythm.      Pulses: Normal pulses.      Heart sounds: Normal heart sounds.   Pulmonary:      Effort: Pulmonary effort is normal. No respiratory distress.      Breath sounds: Normal breath sounds. No wheezing or rales.   Abdominal:      General: Bowel sounds are normal. There is no distension.      Tenderness: There is no abdominal tenderness.   Musculoskeletal: Normal range of motion.         General: No swelling, tenderness or deformity.   Skin:     General: Skin is warm and dry.      Capillary Refill: Capillary refill takes less than 2 seconds.      Findings: No rash.   Neurological:      General: No focal deficit present.      Mental Status: She is alert.      Comments: Mild tremor, moves all extremities   Psychiatric:         Mood and Affect: Mood normal.         Behavior: Behavior normal.         Procedures           ED Course                                           MDM  Number of Diagnoses or Management  Options  COVID-19:   Elevated troponin:   Syncope, unspecified syncope type:   Diagnosis management comments: 75 year old female with reported episode of unresponsiveness. Well developed, well nourished elderly lady in no distress with exam as above. She has normal vital signs. Her exam is non focal at this time. Will obtain labs, EKG, urinalysis. There have been covid cases at the nursing home where she resides so will screen for that as well. Will continue to monitor. Disposition pending.      DDX: UTI, arrhythmia, ACS, dementia, covid    EKG interpreted by me: Poor tracing, likely sinus rhythm, normal rate, incomplete LBBB, nonspecific ST/T wave changes, this is an abnormal EKG, morphology appears similar to previous    Lab work notable for elevated troponin, renal insufficiency that is baseline or slightly better.  Her Covid is positive.  Discussed with Dr. Mason for admission.       Amount and/or Complexity of Data Reviewed  Decide to obtain previous medical records or to obtain history from someone other than the patient: yes        Final diagnoses:   Syncope, unspecified syncope type   COVID-19   Elevated troponin            Zhou Alexandre MD  01/16/21 9061

## 2021-01-16 NOTE — H&P
[facility]   HISTORY AND PHYSICAL      Name:  Tammy Pascual   Age:  75 y.o.  Sex:  female  :  1945  MRN:  [id number]   Visit Number:  [id number]  Admission Date:  2021  Date Of Service:  21  Primary Care Physician:  Sravani Grijalva MD    Chief Complaint:     Episode of altered mental status.    History Of Presenting Illness:      Ms. Pascual is a 75-year-old female from [facility] living [facility] with history of Alzheimer's dementia, chronic kidney disease, diastolic heart failure, atrial fibrillation on apixaban, coronary artery disease was sent from the assisted living [facility] by EMS with an episode of unresponsiveness that lasted for about 2 to 3 minutes.  Unfortunately, patient has dementia and is unable to give me any detailed history.  She is currently feeling better and denies any chest pain.  She denies any shortness of breath.  As per the ER physician, when the patient arrived to the emergency room she was alert and answering questions but was not able to tell why she was in the emergency room.    In the emergency room, her temperature was 98.1, pulse 66, blood pressure 186/70 and pulse oxygen saturation was 97% on room air.  Blood work done in the emergency room including CMP and CBC was remarkable for creatinine of 1.67 (baseline 1.5), potassium 3.4, troponin 0 0.042.  CBC was fairly within normal range.  Urine analysis showed 13-20 RBCs per high-power field but no evidence of nitrites or WBCs.  Chest x-ray showed bilateral pulmonary opacities worrisome for pneumonia.  Her nasal swab for COVID-19 test came back positive.  In view of her generalized weakness, mild elevation of her creatinine level and bilateral pneumonia, she is currently being admitted to the medical floor with telemetry to the COVID-19 unit.    Review Of Systems:     General ROS: Denies any fevers.  Respiratory ROS: No history of cough or shortness of breath.   Cardiovascular  ROS: No history of chest pain or palpitations.  Gastrointestinal ROS: No history of nausea and vomiting.  Genitourinary ROS: No history of dysuria or hematuria.  Neurological ROS: As per history of presenting illness.  I am unable to obtain complete review of systems.     Past Medical History:    Past Medical History:   Diagnosis Date   • Alzheimer's dementia (CMS/HCC) 2019   • Anemia    • Arthritis    • Breast cancer (CMS/HCC)     naveed mastectomy   • Cancer (CMS/HCC)     breast   • Congestive heart failure (CMS/HCC)    • Diabetes mellitus (CMS/HCC)     DIET CONTROLLED   • Disease of thyroid gland    • Hearing loss    • Heart disease    • Hyperlipidemia    • Hypertension    • Kidney disease    • Sleep apnea      Past Surgical history:    Past Surgical History:   Procedure Laterality Date   • BACK SURGERY     • BREAST BIOPSY     • BREAST EXCISIONAL BIOPSY Left 1999   • BREAST RECONSTRUCTION Bilateral    • CAROTID ARTERY ANGIOPLASTY Right     ([facility])   • CATARACT EXTRACTION Bilateral    • COLONOSCOPY N/A 2017    Procedure: COLONOSCOPY;  Surgeon: Sindy Clement MD;  Location: [facility] ENDOSCOPY;  Service:    • HYSTERECTOMY     • MASTECTOMY Bilateral      Social History:    Social History     Socioeconomic History   • Marital status: Single     Spouse name: Not on file   • Number of children: Not on file   • Years of education: Not on file   • Highest education level: Not on file   Tobacco Use   • Smoking status: Former Smoker     Quit date: 2003     Years since quittin.5   • Smokeless tobacco: Never Used   Substance and Sexual Activity   • Alcohol use: No     Frequency: Never   • Drug use: No   • Sexual activity: Not Currently     Family History:    Family History   Problem Relation Age of Onset   • Cancer Mother         breast   • Diabetes Mother    • Heart disease Father    • Stroke Father    • Hypertension Father    • Breast cancer Maternal Grandmother     • Breast cancer Paternal Grandmother      Allergies:      Cephalosporins, Quinolones, Ciprofloxacin, and Keflex [cephalexin]    Home Medications:    Prior to Admission Medications     Prescriptions Last Dose Informant Patient Reported? Taking?    acetaminophen (TYLENOL) 325 MG tablet   Yes No    Take 650 mg by mouth Every 6 (Six) Hours As Needed for Mild Pain (1-3).    amLODIPine (NORVASC) 5 MG tablet   No No    Take 1 tablet by mouth Daily.    apixaban (ELIQUIS) 2.5 MG tablet tablet   No No    Take 1 tablet by mouth Every 12 (Twelve) Hours.    atorvastatin (LIPITOR) 40 MG tablet  Self Yes No    Take 40 mg by mouth Every Night.    clopidogrel (PLAVIX) 75 MG tablet   No No    Take 1 tablet by mouth Daily.    donepezil (ARICEPT) 10 MG tablet   No No    TAKE 1 TABLET BY MOUTH  EVERY NIGHT    ferrous sulfate 325 (65 FE) MG tablet  Pharmacy Yes No    Take 325 mg by mouth Daily With Breakfast.    gabapentin (NEURONTIN) 600 MG tablet   No No    Take 1 tablet by mouth 2 (Two) Times a Day.    isosorbide mononitrate (IMDUR) 30 MG 24 hr tablet   No No    Take 1 tablet by mouth Daily.    memantine (NAMENDA) 10 MG tablet  Pharmacy Yes No    Take 10 mg by mouth 2 (Two) Times a Day.    morphine 20 MG/ML concentrated solution   No No    Take 0.25 mL by mouth Every 3 (Three) Hours As Needed for Severe Pain .           ED Medications:    Medications   sodium chloride 0.9 % flush 10 mL (has no administration in time range)     Vital Signs:    Temp:  [98.1 °F (36.7 °C)] 98.1 °F (36.7 °C)  Heart Rate:  [58-66] 62  Resp:  [17-18] 18  BP: (156-186)/() 167/101        01/16/21  0954 01/16/21  1340   Weight: 72.6 kg (160 lb) 62.1 kg (136 lb 14.5 oz)     Body mass index is 22.1 kg/m².    Physical Exam:    General Appearance:  Alert and cooperative, not in any acute distress.   Head:  Atraumatic and normocephalic, without obvious abnormality.   Eyes:          Conjunctivae and sclerae normal, no Icterus. No pallor. Extraocular movements  are within normal limits.   Ears:  Ears appear intact with no abnormalities noted.   Throat: No oral lesions, no thrush, oral mucosa moist.   Neck: Supple, trachea midline, no thyromegaly, no carotid bruit.   Back:   No kyphoscoliosis present. No tenderness to palpation,   range of motion normal.   Lungs:   Chest shape is normal. Breath sounds heard bilaterally equally.  No crackles or wheezing. No Pleural rub or bronchial breathing.   Heart:  Normal S1 and S2, no murmur, no gallop, no rub. No JVD.   Abdomen:   Normal bowel sounds, no masses, no organomegaly. Soft, nontender, nondistended, no guarding, no rebound tenderness.  Midline surgical scar noted in the lower abdomen.  Seborrheic keratosis noted on the abdominal wall.   Extremities: Moves all extremities, no edema, no cyanosis, no clubbing.   Pulses: Pulses palpable and equal bilaterally.   Skin: No bleeding, bruising or rash.   Neurologic: Alert and oriented to person. Moves all four limbs equally. No tremors. No facial asymmetry.     Laboratory data:    I have reviewed the labs done in the emergency room.    Results from last 7 days   Lab Units 01/16/21  1118   SODIUM mmol/L 138   POTASSIUM mmol/L 3.4*   CHLORIDE mmol/L 103   CO2 mmol/L 25.3   BUN mg/dL 20   CREATININE mg/dL 1.67*   CALCIUM mg/dL 9.1   BILIRUBIN mg/dL 0.9   ALK PHOS U/L 77   ALT (SGPT) U/L 33   AST (SGOT) U/L 39*   GLUCOSE mg/dL 142*     Results from last 7 days   Lab Units 01/16/21  1011   WBC 10*3/mm3 3.98   HEMOGLOBIN g/dL 12.7   HEMATOCRIT % 41.3   PLATELETS 10*3/mm3 157         Results from last 7 days   Lab Units 01/16/21  1118   TROPONIN T ng/mL 0.042*       Results from last 7 days   Lab Units 01/16/21  1011   COLOR UA  Yellow   GLUCOSE UA  Negative   KETONES UA  Negative   LEUKOCYTES UA  Negative   PH, URINE  6.0   BILIRUBIN UA  Negative   UROBILINOGEN UA  1.0 E.U./dL     EKG:      EKG done in the emergency room was reviewed by me.  Significant baseline artifact noted.  Patient  likely has sinus rhythm at 66 bpm.  Normal axis.  Nonspecific ST-T changes noted in the inferior limb leads and lateral chest leads.    Radiology:    Imaging Results (Last 72 Hours)     Procedure Component Value Units Date/Time    XR Chest 1 View [[id number]] Collected: 01/16/21 1234     Updated: 01/16/21 1237    Narrative:      PROCEDURE: XR CHEST 1 VW-     HISTORY: Weak/Dizzy/AMS triage protocol        COMPARISON: 12/29/2020.     FINDINGS:  The heart size is normal. The mediastinum is normal. There  are mild pulmonary opacities worrisome for bilateral pneumonia. There is  no pneumothorax. The bony thorax in intact.       Impression:      Bilateral pulmonary opacities worrisome for pneumonia.           This report was finalized on 1/16/2021 12:35 PM by Gatito Nunez MD.        Assessment:    1.  Bilateral pneumonia secondary to COVID-19, POA.  2.  History of transient unresponsiveness prior to admission, resolved.  3.  Chronic kidney disease stage III.  4.  Paroxysmal atrial fibrillation on apixaban.  5.  Essential hypertension.  6.  Alzheimer's dementia.  7.  Hypokalemia, POA.  8.  Chronic diastolic heart failure-no evidence of exacerbation.    Plan:    Ms. Pascual is currently being admitted to the medical floor with telemetry to the COVID-19 unit.  She does have bilateral pneumonia secondary to COVID-19 but fortunately is currently on room air.  At this time she does not require remdesivir but I will place her on dexamethasone for 5 days.    Patient does have mild elevation of troponin levels and we will repeat troponins today.  She is denying any chest pain and EKG does not show any ST-T changes.  She will be placed on gentle IV hydration with normal saline for 1 L.  Her home medications including apixaban will be continued.  She will be started on physical therapy from tomorrow.  Her CODE STATUS will be full code.  Further recommendations depend upon her clinical course.  Discussed with nursing  staff.    Advance Care Planning      ACP discussion was declined by the patient. Patient does not have an advance directive, declines further assistance.      Jayy Mason MD  01/16/21  14:46 EST    Dictated utilizing Dragon dictation.

## 2021-01-16 NOTE — PLAN OF CARE
Goal Outcome Evaluation:no change   New admit from ED-patient  alert & oriented to name only-Dr. Mason here to see patient-will continue to monitor

## 2021-01-17 NOTE — PLAN OF CARE
Goal Outcome Evaluation:  Plan of Care Reviewed With: patient, family   Maintans SAT on room air.  No reports of pain.  Confused at times.  Assisted with repositioning frequently.  No acute events this shift.

## 2021-01-17 NOTE — PROGRESS NOTES
[facility]    Name:  Tammy Pascual   Age:  75 y.o.  Sex:  female  :  1945  MRN:  [id number]   Visit Number:  [id number]  Admission Date:  2021  Date Of Service:  21  Primary Care Physician:  Sravani Grijalva MD     LOS: 0 days :  Patient Care Team:  Sravani Grijalva MD as PCP -   [facility], Luis Fernando Alonzo MD as Consulting Physician (Cardiology)  Sindy Clement MD as Surgeon (General Surgery):    Chief Complaint:      Confusion.    Subjective / Interval History:     Ms. Pascual was seen and examined this morning.  She is currently lying down on the bed and is comfortable at rest.  She is currently on room air and saturating in the low 90s.  Unfortunately, she has dementia and confusion and at times, refusing medications and diet.  She was also noted to have elevated blood pressures through the night.  She was admitted from the emergency room yesterday with history of altered mental status at the assisted living facility.  Patient did not have any hypoxia but was noted to have COVID-19 test positive.  She may have to go to long-term nursing home facility.    Review of Systems:     I am unable to obtain due to her confusion.  She states that she does not know why she is in the hospital.    Vital Signs:    Temp:  [97.1 °F (36.2 °C)-98.6 °F (37 °C)] 98.3 °F (36.8 °C)  Heart Rate:  [37-69] 66  Resp:  [16-18] 18  BP: (151-181)/() 151/90    Intake and output:    I/O last 3 completed shifts:  In: 1200 [I.V.:1200]  Out: 0   I/O this shift:  In: 445 [P.O.:195; IV Piggyback:250]  Out: -     Physical Examination:    General Appearance:  Alert and cooperative, not in any acute distress.   Head:  Atraumatic and normocephalic, without obvious abnormality.   Eyes:          Conjunctivae and sclerae normal, no Icterus. No pallor. Extraocular movements are within normal limits.   Neck: Supple, trachea midline, no thyromegaly, no carotid bruit.   Lungs:   Chest  Okay, rx should be 120 mg under the skin once a month though. shape is normal. Breath sounds heard bilaterally equally.  No crackles or wheezing. No pleural rub or bronchial breathing.  Right mastectomy scar noted.   Heart:  Normal S1 and S2, no murmur, no gallop, no rub. No JVD   Abdomen:   Normal bowel sounds, no masses, no organomegaly. Soft, nontender, nondistended, no guarding, no rebound tenderness. Midline surgical scar noted in the lower abdomen.  Seborrheic keratosis noted on the abdominal wall.   Extremities: Moves all extremities, no edema, no cyanosis, no clubbing.   Skin: No bleeding, bruising or rash.   Neurologic: Awake, but pleasantly confused. Moves all 4 extremities equally.     Laboratory results:    Results from last 7 days   Lab Units 01/17/21  0551 01/16/21  1118   SODIUM mmol/L 140 138   POTASSIUM mmol/L 4.0 3.4*   CHLORIDE mmol/L 103 103   CO2 mmol/L 23.5 25.3   BUN mg/dL 24* 20   CREATININE mg/dL 1.62* 1.67*   CALCIUM mg/dL 9.2 9.1   BILIRUBIN mg/dL  --  0.9   ALK PHOS U/L  --  77   ALT (SGPT) U/L  --  33   AST (SGOT) U/L  --  39*   GLUCOSE mg/dL 164* 142*     Results from last 7 days   Lab Units 01/17/21  0551 01/16/21  1011   WBC 10*3/mm3 3.19* 3.98   HEMOGLOBIN g/dL 11.4* 12.7   HEMATOCRIT % 34.4 41.3   PLATELETS 10*3/mm3 181 157         Results from last 7 days   Lab Units 01/17/21  0551 01/16/21  1705 01/16/21  1118   TROPONIN T ng/mL 0.021 0.034* 0.042*     I have reviewed the patient's laboratory results.    Radiology results:    Imaging Results (Last 24 Hours)     ** No results found for the last 24 hours. **        Medication Review:     I have reviewed the patient's active and prn medications.     Problem List:      Pneumonia due to COVID-19 virus    Paroxysmal atrial fibrillation (CMS/HCC)    Hypothyroidism (acquired)    Essential hypertension    Dementia of the Alzheimer's type with late onset without behavioral disturbance (CMS/HCC)    Elevated troponin    CKD (chronic kidney disease) stage 3, GFR 30-59 ml/min     Syncope    Assessment:    1.  Bilateral pneumonia secondary to COVID-19, POA.  2.  History of transient unresponsiveness prior to admission, resolved.  3.  Chronic kidney disease stage III.  4.  Paroxysmal atrial fibrillation on apixaban.  5.  Essential hypertension.  6.  Alzheimer's dementia.  7.  Hypokalemia, POA.  8.  Chronic diastolic heart failure-no evidence of exacerbation.    Plan:    Ms. Pascual is currently hemodynamically stable and saturating in the 90s on room air.  She does have COVID-19 infection but is fairly asymptomatic.  We will continue her on dexamethasone for 5 days.  She was noted to have elevated procalcitonin levels this morning and I will give her 5 days of doxycycline.    Patient did have borderline elevated troponin levels but they have trended down.  She denies any chest pain.  No further treatment is necessary at this time.  She will be continued on her home medications including apixaban.  Her blood pressures are slightly elevated today and I will increase her amlodipine dose to 10 mg.    Patient lives at [facility].  And I suspect that she may not be able to go back there as she is unable to care for herself.  She may benefit from going to long-term care facility.  Discussed with case management services.    Jayy Mason MD  01/17/21  13:00 EST    Dictated utilizing Dragon dictation.

## 2021-01-17 NOTE — PLAN OF CARE
Goal Outcome Evaluation:  Plan of Care Reviewed With: patient     Outcome Summary: PT evaluation completed bedside. Pt was oriented to self only and expressed fear of falling when assisted to EOB. Pt did assist with getting BLEs off EOB, needing min assist to complete and mod assist to return to supine. Mod assist with brief standing trial with pt expressing fear again. Pt presents with deficits in balance, strength, and endurance. She is expected to improve her functional mobility with continued PT services prior to d/c. Pt could not provide PMH and whether she used a walker or not. Will plan to try rolling walker with pt during next PT treatment.

## 2021-01-17 NOTE — PROGRESS NOTES
Discharge Planning Assessment   [place]     Patient Name: Tammy Pascual  MRN: [id number]  Today's Date: 1/17/2021    Admit Date: 1/16/2021    Discharge Needs Assessment     Row Name 01/17/21 1306       Living Environment    Lives With  facility resident    Current Living Arrangements  extended care facility    Primary Care Provided by  other (see comments)    Provides Primary Care For  no one, unable/limited ability to care for self    Family Caregiver if Needed  significant other    Family Caregiver Names  Milton Pascual    Quality of Family Relationships  helpful       Transition Planning    Transportation Anticipated  health plan transportation       Discharge Needs Assessment    Readmission Within the Last 30 Days  no previous admission in last 30 days    Equipment Currently Used at Home  -- all needs met by SNF    Discharge Facility/Level of Care Needs  nursing facility, skilled        Discharge Plan     Row Name 01/17/21 1314       Plan    Plan  Case Management spoke to pt GINGER Milton Ankur. Tammy has been at [facility] since 11/2020 .He feels like she will not be able to return to her apartment at [facility] he would prefer that she  discharged to  [facility] .She has been there before.   Case Management to contact [facility] on Monday 1/18. Milton reports that if  [facility] cannot accept her he is accepting of returning to [facility]         Continued Care and Services - Admitted Since 1/16/2021    Coordination has not been started for this encounter.     Selected Continued Care - Prior Encounters Includes selections from prior encounters from 10/18/2020 to 1/17/2021    Discharged on 11/17/2020 Admission date: 11/12/2020 - Discharge disposition: Rehab Facility or Unit (DC - External)    Destination     Service Provider Selected Services Address Phone Fax Patient Preferred    [facility]  Skilled Nursing [address] [place] [address] [phone] --                       Demographic Summary     Row Name 01/17/21 8969       General Information    Admission Type  inpatient    Required Notices Provided  Important Message from Medicare    Referral Source  admission list        Functional Status    No documentation.       Psychosocial    No documentation.       Abuse/Neglect    No documentation.       Legal    No documentation.       Substance Abuse    No documentation.       Patient Forms    No documentation.           Julieta Ferrara RN

## 2021-01-17 NOTE — THERAPY EVALUATION
Patient Name: Tammy Pascual  : 1945    MRN: [id number]                              Today's Date: 2021       Admit Date: 2021    Visit Dx:     ICD-10-CM ICD-9-CM   1. Syncope, unspecified syncope type  R55 780.2   2. COVID-19  U07.1 079.89   3. Elevated troponin  R77.8 790.6     Patient Active Problem List   Diagnosis   • Spinal stenosis, lumbar region, with neurogenic claudication   • Malignant neoplasm of upper-outer quadrant of right breast in female, estrogen receptor positive (CMS/HCC)   • Pulmonary nodules   • Paroxysmal atrial fibrillation (CMS/HCC)   • Occult blood positive stool   • Shortness of breath   • Acute pulmonary edema (CMS/[facility])   • Obstructive sleep apnea   • Hypertensive urgency   • Hypothyroidism (acquired)   • Flash pulmonary edema (CMS/HCC)   • Chronic kidney disease, stage IV (severe) (CMS/[facility])   • Acute renal failure superimposed on chronic kidney disease (CMS/[facility])   • Hyponatremia   • Essential hypertension   • Anemia, chronic disease   • Dementia of the Alzheimer's type with late onset without behavioral disturbance (CMS/[facility])   • Iron deficiency anemia, unspecified   • Pneumonia of right lower lobe due to infectious organism   • Elevated troponin   • Acute metabolic encephalopathy   • CKD (chronic kidney disease) stage 3, GFR 30-59 ml/min   • Pneumonia of left lung due to infectious organism   • Syncope   • Pneumonia due to COVID-19 virus     Past Medical History:   Diagnosis Date   • Alzheimer's dementia (CMS/HCC) 2019   • Anemia    • Arthritis    • Breast cancer (CMS/[facility])     naveed mastectomy   • Cancer (CMS/[facility])     breast   • Congestive heart failure (CMS/[facility])    • Diabetes mellitus (CMS/[facility])     DIET CONTROLLED   • Disease of thyroid gland    • Hearing loss    • Heart disease    • Hyperlipidemia    • Hypertension    • Kidney disease    • Sleep apnea      Past Surgical History:   Procedure Laterality Date   • BACK SURGERY     • BREAST BIOPSY     • BREAST  "EXCISIONAL BIOPSY Left 02/08/1999   • BREAST RECONSTRUCTION Bilateral    • CAROTID ARTERY ANGIOPLASTY Right 2008    (Psychiatric)   • CATARACT EXTRACTION Bilateral    • COLONOSCOPY N/A 8/17/2017    Procedure: COLONOSCOPY;  Surgeon: Sindy Clement MD;  Location: [facility] ENDOSCOPY;  Service:    • HYSTERECTOMY     • MASTECTOMY Bilateral 2010     General Information     Row Name 01/17/21 1400          Physical Therapy Time and Intention    Document Type  evaluation  -TW     Mode of Treatment  physical therapy  -TW     Row Name 01/17/21 1400          General Information    Patient Profile Reviewed  yes  -TW     Prior Level of Function  independent:;all household mobility Pt lives in assisted living and says she got around without help but could not tell therapist if she used an assistive device or not.  -TW     Existing Precautions/Restrictions  fall  -TW     Barriers to Rehab  cognitive status;medically complex  -TW     Row Name 01/17/21 1400          Living Environment    Lives With  facility resident assistive living facility  -TW     Row Name 01/17/21 1400          Home Main Entrance    Number of Stairs, Main Entrance  none  -TW     Row Name 01/17/21 1400          Stairs Within Home, Primary    Number of Stairs, Within Home, Primary  none  -TW     Row Name 01/17/21 1400          Cognition    Orientation Status (Cognition)  oriented to;person;verbal cues/prompts needed for orientation Pt oriented to self only. Pt not able to remember where she was or why she came to bed here. Pt did state \"I'm just so tired.\" and \"I'm not well enough to get OOB.\"  -TW     Row Name 01/17/21 1400          Safety Issues, Functional Mobility    Safety Issues Affecting Function (Mobility)  safety precaution awareness;safety precautions follow-through/compliance;insight into deficits/self-awareness;awareness of need for assistance  -TW     Impairments Affecting Function (Mobility)  balance;strength;endurance/activity " "tolerance  -TW     Comment, Safety Issues/Impairments (Mobility)  Pt expresses fear of fallin off EOB in sitting but demonstrated WFLs sitting balance.  -TW       User Key  (r) = Recorded By, (t) = Taken By, (c) = Cosigned By    Initials Name Provider Type    Tracie Doe, OPAL Physical Therapist        Mobility     Row Name 01/17/21 1400          Bed Mobility    Bed Mobility  supine-sit;sit-supine  -TW     Supine-Sit [place] (Bed Mobility)  verbal cues;minimum assist (75% patient effort)  -TW     Sit-Supine [place] (Bed Mobility)  moderate assist (50% patient effort);verbal cues  -TW     Assistive Device (Bed Mobility)  head of bed elevated  -TW     Comment (Bed Mobility)  Extended time needed to complete tasks as well as reassurance that pt was safe.  -     Row Name 01/17/21 1400          Transfers    Comment (Transfers)  Pt expressed great fear of falling when coming to stand, yelling out, \"I can't, I can't. It's too soon.\"  -     Row Name 01/17/21 1400          Bed-Chair Transfer    Bed-Chair [place] (Transfers)  unable to assess  -     Row Name 01/17/21 1400          Sit-Stand Transfer    Sit-Stand [place] (Transfers)  moderate assist (50% patient effort);verbal cues;nonverbal cues (demo/gesture)  -TW     Assistive Device (Sit-Stand Transfers)  -- gt belt. Pt would benefit from trial with rolling walker for next session.  -     Row Name 01/17/21 1400          Gait/Stairs (Locomotion)    [place] Level (Gait)  unable to assess  -       User Key  (r) = Recorded By, (t) = Taken By, (c) = Cosigned By    Initials Name Provider Type    Tracie Doe PT Physical Therapist        Obj/Interventions     Row Name 01/17/21 1400          Range of Motion Comprehensive    Comment, General Range of Motion  Grossly WFLs BLE  -     Row Name 01/17/21 1400          Strength Comprehensive (MMT)    Comment, General Manual Muscle Testing (MMT) Assessment  pt not able to follow directions for " a MMT but did move LE's partial range against gravity while in bed and with sitting on EOB partial range knee ext B  -TW     Row Name 01/17/21 1400          Balance    Balance Assessment  sitting static balance;standing static balance;sitting dynamic balance  -TW     Static Sitting Balance  WFL;unsupported;sitting, edge of bed Pt does express fear of falling while sitting on EOB  -TW     Dynamic Sitting Balance  WFL;unsupported;sitting, edge of bed  -TW     Static Standing Balance  moderate impairment;supported  -TW     Comment, Balance  Pt able to sit on EOB x 7 min with encouragement and reassurance that she was safe.  -TW       User Key  (r) = Recorded By, (t) = Taken By, (c) = Cosigned By    Initials Name Provider Type    TW Sherif, Tracie, PT Physical Therapist        Goals/Plan     Row Name 01/17/21 1400          Bed Mobility Goal 1 (PT)    Activity/Assistive Device (Bed Mobility Goal 1, PT)  bed mobility activities, all  -TW     [place] Level/Cues Needed (Bed Mobility Goal 1, PT)  standby assist  -TW     Time Frame (Bed Mobility Goal 1, PT)  2 weeks  -TW     Progress/Outcomes (Bed Mobility Goal 1, PT)  goal ongoing  -TW     Row Name 01/17/21 1400          Transfer Goal 1 (PT)    Activity/Assistive Device (Transfer Goal 1, PT)  sit-to-stand/stand-to-sit;bed-to-chair/chair-to-bed;toilet;walker, rolling  -TW     [place] Level/Cues Needed (Transfer Goal 1, PT)  standby assist  -TW     Time Frame (Transfer Goal 1, PT)  2 weeks  -TW     Progress/Outcome (Transfer Goal 1, PT)  goal ongoing  -TW     Row Name 01/17/21 1400          Gait Training Goal 1 (PT)    Activity/Assistive Device (Gait Training Goal 1, PT)  gait (walking locomotion);decrease fall risk;increase endurance/gait distance;improve balance and speed;walker, rolling  -TW     [place] Level (Gait Training Goal 1, PT)  standby assist  -TW     Distance (Gait Training Goal 1, PT)  100 ft  -TW     Time Frame (Gait Training Goal 1, PT)  2 weeks   -TW     Progress/Outcome (Gait Training Goal 1, PT)  goal ongoing  -TW     Row Name 01/17/21 1400          Patient Education Goal (PT)    Activity (Patient Education Goal, PT)  LE ther ex 1 x 10  -TW     [place]/Cues/Accuracy (Memory Goal 2, PT)  demonstrates adequately  -TW     Time Frame (Patient Education Goal, PT)  2 weeks  -TW     Barriers (Patient Education Goal, PT)  pt's dementia  -TW     Progress/Outcome (Patient Education Goal, PT)  goal ongoing  -TW       User Key  (r) = Recorded By, (t) = Taken By, (c) = Cosigned By    Initials Name Provider Type    TW Tracie Gorman, PT Physical Therapist        Clinical Impression     Row Name 01/17/21 1400          Pain    Additional Documentation  Pain Scale: FACES Pre/Post-Treatment (Group)  -TW     Row Name 01/17/21 1400          Pain Scale: FACES Pre/Post-Treatment    Pain: FACES Scale, Pretreatment  0-->no hurt  -TW     Posttreatment Pain Rating  0-->no hurt  -TW     Row Name 01/17/21 1400          Plan of Care Review    Plan of Care Reviewed With  patient  -TW     Outcome Summary  PT evaluation completed bedside. Pt was oriented to self only and expressed fear of falling when assisted to EOB. Pt did assist with getting BLEs off EOB, needing min assist to complete and mod assist to return to supine. Mod assist with brief standing trial with pt expressing fear again. Pt presents with deficits in balance, strength, and endurance. She is expected to improve her functional mobility with continued PT services prior to d/c. Pt could not provide PMH and whether she used a walker or not. Will plan to try rolling walker with pt during next PT treatment.  -TW     Row Name 01/17/21 1400          Therapy Assessment/Plan (PT)    Patient/Family Therapy Goals Statement (PT)  none stated.  -TW     Rehab Potential (PT)  fair, will monitor progress closely Due to dementia, unclear how much pt can participate.  -TW     Criteria for Skilled Interventions Met (PT)  yes;meets  criteria  -TW     Predicted Duration of Therapy Intervention (PT)  2 wks  -TW     Row Name 01/17/21 1400          Vital Signs    Pre SpO2 (%)  99  -TW     O2 Delivery Pre Treatment  room air  -TW     Post SpO2 (%)  98  -TW     Pre Patient Position  Supine  -TW     Intra Patient Position  Standing  -TW     Post Patient Position  Supine  -TW     Row Name 01/17/21 1400          Positioning and Restraints    Pre-Treatment Position  in bed  -TW     Post Treatment Position  bed  -TW     In Bed  call light within reach;encouraged to call for assist;exit alarm on;supine;side rails up x3  -TW       User Key  (r) = Recorded By, (t) = Taken By, (c) = Cosigned By    Initials Name Provider Type    TW Tracie Gorman, PT Physical Therapist        Outcome Measures     Row Name 01/17/21 1400          How much help from another person do you currently need...    Turning from your back to your side while in flat bed without using bedrails?  3  -TW     Moving from lying on back to sitting on the side of a flat bed without bedrails?  3  -TW     Moving to and from a bed to a chair (including a wheelchair)?  2  -TW     Standing up from a chair using your arms (e.g., wheelchair, bedside chair)?  2  -TW     Climbing 3-5 steps with a railing?  1  -TW     Row Name 01/17/21 1400          Functional Assessment    Outcome Measure Options  AM-PAC 6 Clicks Basic Mobility (PT)  -TW       User Key  (r) = Recorded By, (t) = Taken By, (c) = Cosigned By    Initials Name Provider Type    TW Tracie Gorman, PT Physical Therapist        Physical Therapy Education                 Title: PT OT SLP Therapies (In Progress)     Topic: Physical Therapy (In Progress)     Point: Mobility training (Done)     Learning Progress Summary           Patient Acceptance, E,D, DU,NR by TW at 1/17/2021 1400    Comment: Pt education on technique for bed mobility and for sit to stand transfers. Pt will need further education due to her dementia.                   Point: Home  exercise program (Not Started)     Learner Progress:  Not documented in this visit.                      User Key     Initials Effective Dates Name Provider Type Discipline    TW 03/26/19 -  Tracie Gorman PT Physical Therapist PT              PT Recommendation and Plan  Planned Therapy Interventions (PT): balance training, bed mobility training, gait training, home exercise program, patient/family education, strengthening, transfer training  Plan of Care Reviewed With: patient  Outcome Summary: PT evaluation completed bedside. Pt was oriented to self only and expressed fear of falling when assisted to EOB. Pt did assist with getting BLEs off EOB, needing min assist to complete and mod assist to return to supine. Mod assist with brief standing trial with pt expressing fear again. Pt presents with deficits in balance, strength, and endurance. She is expected to improve her functional mobility with continued PT services prior to d/c. Pt could not provide PMH and whether she used a walker or not. Will plan to try rolling walker with pt during next PT treatment.     Time Calculation:   PT Charges     Row Name 01/17/21 1400             Time Calculation    Stop Time  1400  -TW      PT Received On  01/17/21 -TW      PT Goal Re-Cert Due Date  01/27/21  -TW        User Key  (r) = Recorded By, (t) = Taken By, (c) = Cosigned By    Initials Name Provider Type    TW Tracie Gorman PT Physical Therapist        Therapy Charges for Today     Code Description Service Date Service Provider Modifiers Qty    81296054633 HC PT EVAL LOW COMPLEXITY 3 1/17/2021 Tracie Gorman PT GP 1          PT G-Codes  Outcome Measure Options: AM-PAC 6 Clicks Basic Mobility (PT)    Tracie Gorman PT  1/17/2021

## 2021-01-17 NOTE — PLAN OF CARE
Goal Outcome Evaluation:      Patient labile this shift, refusing medications initially but then taking them in pudding. Slightly uncooperative at times but easily reassured. Unsure last dose of several home medications; of note, BP gradually increased throughout the night. Night hospitalist Dr Argueta called and notified of BP readings early this a.m. but does not wish to give prn medications or a.m. doses early. No new orders obtained. Patient closely monitored and without complaints. A&O to self. Baseline tremor noted. Bilateral mastectomy limiting sticks and BP readings. PT to work with patient today. 1 Liter gentle IV hydration given overnight as ordered.

## 2021-01-17 NOTE — NURSING NOTE
@ 6773 report received from ED RN  Kathi & Radha-patient to be transferred to room #326 Covid +    @ 1340 patient transferred to room # 326 via stretcher per ED PCT-patient tolerated transfer

## 2021-01-18 NOTE — PLAN OF CARE
Goal Outcome Evaluation:  Plan of Care Reviewed With: patient  Progress: no change  Outcome Summary: Bedside eval of swallow completed.  Pt. seated upright in bed for po trials.  She was hesitant to accept po trials, but participated with encouragement.  Pt. was given trials of regular solid, puree, and thin liquid.  Oral phase was mildly impaired with extended bolus prep time with regular solid due to no upper dentition and also cognitive deficits, but adequate.  No overt s/s aspiration or other pharyngeal phase dysphagia with any trial.  Pt. is at increased risk of malnutrition and dehydration due to cognitive deficits and reduced taste/smell with COVID.  Recommend:  1. mechanical soft diet with thin liq as bari,  2. meds whole as bari,  3. aspiration precautions, 4. monitor for sufficient po intake, pt. may need encouragement and assistance,  5. Question for MD:  Is Megace an option?

## 2021-01-18 NOTE — PROGRESS NOTES
"      [facility]    Name:  Tammy CHRISTENSEN Young   Age:  75 y.o.  Sex:  female  :  1945  MRN:  [id number]   Visit Number:  [id number]  Admission Date:  2021  Date Of Service:  21  Primary Care Physician:  Sravani Grijalva MD     LOS: 1 day :      Chief Complaint:  Follow up covid pneumonia        Subjective / Interval History: The patient was seen this morning. She was paranoid and afraid of the CAPR device I was wearing at first. Later she reported \"Thank you for being so nice to me.\"  No acute events occurred overnight. She has dementia.     The patient is a 75 year old lady from [facility] admitted with COVID pneumonia.       Review of Systems:     General ROS: Patient denies any fevers, chills or loss of consciousness. Generalized fatigue  Ophthalmic ROS: Denies any diplopia or transient loss of vision.  ENT ROS: Denies sore throat, nasal congestion or ear pain.   Respiratory ROS: Positive cough without shortness of breath.  Cardiovascular ROS: Denies chest pain or palpitations. No history of exertional chest pain.   Gastrointestinal ROS: Denies nausea and vomiting. Denies any abdominal pain. No diarrhea.  Genitourinary ROS: Denies dysuria or hematuria.  Musculoskeletal ROS: Denies  back pain. No muscle pain. No calf pain.  Neurological ROS: Denies any focal weakness. No loss of consciousness. Denies any numbness. Denies neck pain.   Dermatological ROS: Denies any redness or pruritis.    Vital Signs:    Temp:  [96.7 °F (35.9 °C)-98.2 °F (36.8 °C)] 97.6 °F (36.4 °C)  Heart Rate:  [62-78] 62  Resp:  [18] 18  BP: ()/(50-75) 156/63    Intake and output:    I/O last 3 completed shifts:  In: 2345 [P.O.:845; I.V.:1000; IV Piggyback:500]  Out: 0   I/O this shift:  In: 360 [P.O.:360]  Out: -     Physical Examination:    General Appearance:    Elderly lady. Paranoid. Pleasant and cooperative with reassurance. Alert and cooperative, not in " any acute distress.   Head:    Atraumatic and normocephalic, without obvious abnormality.   Eyes:            PERRLA, conjunctivae and sclerae normal, no Icterus. No pallor. Extraocular movements are within normal limits.   Throat:   No oral lesions, no thrush, oral mucosa moist.   Neck:   Supple, trachea midline, no thyromegaly   Lungs:     Chest shape is normal. Breath sounds heard bilaterally equally reduced.  No crackles or wheezing. No Pleural rub or bronchial breathing.    Heart:    Normal S1 and S2, no murmur, no gallop, no rub. No JVD   Abdomen:     Normal bowel sounds, no masses, no organomegaly. Soft     nontender, nondistended, no guarding, no rebound                tenderness   Extremities:   Moves all extremities well, no edema, no cyanosis, no           clubbing   Skin:   No bleeding, bruising or rash.   Neurologic:   Cranial nerves 2 - 12 grossly intact, sensation intact, Motor power is normal and equal bilaterally.   Laboratory results:    Results from last 7 days   Lab Units 01/17/21  0551 01/16/21  1118   SODIUM mmol/L 140 138   POTASSIUM mmol/L 4.0 3.4*   CHLORIDE mmol/L 103 103   CO2 mmol/L 23.5 25.3   BUN mg/dL 24* 20   CREATININE mg/dL 1.62* 1.67*   CALCIUM mg/dL 9.2 9.1   BILIRUBIN mg/dL  --  0.9   ALK PHOS U/L  --  77   ALT (SGPT) U/L  --  33   AST (SGOT) U/L  --  39*   GLUCOSE mg/dL 164* 142*     Results from last 7 days   Lab Units 01/17/21  0551 01/16/21  1011   WBC 10*3/mm3 3.19* 3.98   HEMOGLOBIN g/dL 11.4* 12.7   HEMATOCRIT % 34.4 41.3   PLATELETS 10*3/mm3 181 157         Results from last 7 days   Lab Units 01/17/21  0551 01/16/21  1705 01/16/21  1118   TROPONIN T ng/mL 0.021 0.034* 0.042*               I have reviewed the patient's laboratory results.    Radiology results:    Imaging Results (Last 24 Hours)     ** No results found for the last 24 hours. **          I have reviewed the patient's radiology reports.    Medication Review:     I have reviewed the patient's active and prn  medications.     Assessment:  1.  Bilateral pneumonia secondary to COVID-19, POA.  2.  History of transient unresponsiveness prior to admission, resolved.  3.  Chronic kidney disease stage III.  4.  Paroxysmal atrial fibrillation on apixaban.  5.  Essential hypertension.  6.  Alzheimer's dementia.  7.  Hypokalemia, POA.  8.  Chronic diastolic heart failure-no evidence of exacerbation.         Plan:  Continue to monitor inpatient. Decadron and doxycycline will be continued. She is not hypoxic, so hold off on Remdesivir for now.   PT, OT consulted. I feel she would benefit from acute rehab placement at this time. Daughter on admission had felt facility placement would be required as well.   Medication risks and benefits were discussed in detail. Patient reported satisfaction with care delivered and treatment plan.     Spoke to POA on phone:  Patient previously out of original quarantine last week. POA saw her through the glass. She has lost weight with tremors and difficulty eating.   She was pocketing her medications as well, with concern for possible aspiration. Maintain aspiration precautions. He requested she restart CPAP, that she has at home.     He requested she be transferred to [facility].     Sweta Dumont DO  01/18/21  16:49 EST

## 2021-01-18 NOTE — THERAPY EVALUATION
Acute Care - Speech Language Pathology   Swallow Initial Evaluation ABELARDO [place]     Patient Name: Tammy Pascual  : 1945  MRN: [id number]  Today's Date: 2021               Admit Date: 2021    Visit Dx:     ICD-10-CM ICD-9-CM   1. Syncope, unspecified syncope type  R55 780.2   2. COVID-19  U07.1 079.89   3. Elevated troponin  R77.8 790.6     Patient Active Problem List   Diagnosis   • Spinal stenosis, lumbar region, with neurogenic claudication   • Malignant neoplasm of upper-outer quadrant of right breast in female, estrogen receptor positive (CMS/HCC)   • Pulmonary nodules   • Paroxysmal atrial fibrillation (CMS/HCC)   • Occult blood positive stool   • Shortness of breath   • Acute pulmonary edema (CMS/HCC)   • Obstructive sleep apnea   • Hypertensive urgency   • Hypothyroidism (acquired)   • Flash pulmonary edema (CMS/HCC)   • Chronic kidney disease, stage IV (severe) (CMS/HCC)   • Acute renal failure superimposed on chronic kidney disease (CMS/HCC)   • Hyponatremia   • Essential hypertension   • Anemia, chronic disease   • Dementia of the Alzheimer's type with late onset without behavioral disturbance (CMS/HCC)   • Iron deficiency anemia, unspecified   • Pneumonia of right lower lobe due to infectious organism   • Elevated troponin   • Acute metabolic encephalopathy   • CKD (chronic kidney disease) stage 3, GFR 30-59 ml/min   • Pneumonia of left lung due to infectious organism   • Syncope   • Pneumonia due to COVID-19 virus     Past Medical History:   Diagnosis Date   • Alzheimer's dementia (CMS/HCC) 2019   • Anemia    • Arthritis    • Breast cancer (CMS/HCC)     naveed mastectomy   • Cancer (CMS/HCC)     breast   • Congestive heart failure (CMS/HCC)    • Diabetes mellitus (CMS/HCC)     DIET CONTROLLED   • Disease of thyroid gland    • Hearing loss    • Heart disease    • Hyperlipidemia    • Hypertension    • Kidney disease    • Sleep apnea      Past Surgical History:   Procedure Laterality  Date   • BACK SURGERY  2015   • BREAST BIOPSY  1998   • BREAST EXCISIONAL BIOPSY Left 02/08/1999   • BREAST RECONSTRUCTION Bilateral    • CAROTID ARTERY ANGIOPLASTY Right 2008    ([facility])   • CATARACT EXTRACTION Bilateral    • COLONOSCOPY N/A 8/17/2017    Procedure: COLONOSCOPY;  Surgeon: Sindy Clement MD;  Location: [facility] ENDOSCOPY;  Service:    • HYSTERECTOMY     • MASTECTOMY Bilateral 2010        SWALLOW EVALUATION (last 72 hours)      SLP Adult Swallow Evaluation     Row Name 01/18/21 1310                   Rehab Evaluation    Document Type  evaluation  -TM        Subjective Information  no complaints  -TM        Patient Observations  alert;poorly cooperative with encouragement  -TM        Patient Effort  fair  -TM           General Information    Patient Profile Reviewed  yes  -TM        Pertinent History Of Current Problem  pneumonia, COVID, Alzheimer's, CHF  -TM        Current Method of Nutrition  regular textures;thin liquids  -TM        Precautions/Limitations, Vision  WFL with corrective lenses  -TM        Prior Level of Function-Communication  cognitive-linguistic impairment  -TM        Prior Level of Function-Swallowing  unknown  -TM        Plans/Goals Discussed with  other (see comments) RN  -TM        Barriers to Rehab  cognitive status  -TM        Patient's Goals for Discharge  patient did not state  -TM           Pain    Additional Documentation  Pain Scale: FACES Pre/Post-Treatment (Group)  -TM           Pain Scale: FACES Pre/Post-Treatment    Pain: FACES Scale, Pretreatment  0-->no hurt  -TM        Posttreatment Pain Rating  0-->no hurt  -TM           Oral Motor Structure and Function    Oral Lesions or Structural Abnormalities and/or variants  none identified  -TM        Dentition Assessment  other (see comments);lower dentures/partial in place lower dentition only  -TM        Secretion Management  WNL/WFL  -TM        Mucosal Quality  dry  -TM        Volitional Cough  WFL   -TM           Oral Musculature and Cranial Nerve Assessment    Oral Motor General Assessment  WFL  -TM           General Eating/Swallowing Observations    Respiratory Support Currently in Use  room air  -TM        Eating/Swallowing Skills  fed by SLP  -TM        Positioning During Eating  upright in bed  -TM        Utensils Used  spoon;straw  -TM        Consistencies Trialed  regular textures;pureed;thin liquids  -TM           Respiratory    Respiratory Status  WFL;room air;during swallowing/eating  -TM           Clinical Swallow Eval    Oral Prep Phase  impaired  -TM        Oral Transit  WFL  -TM        Oral Residue  WFL  -TM        Pharyngeal Phase  no overt signs/symptoms of pharyngeal impairment  -TM        Clinical Swallow Evaluation Summary  Bedside eval of swallow completed.  Pt. seated upright in bed for po trials.  She was hesitant to accept po trials, but participated with encouragement.  Pt. was given trials of regular solid, puree, and thin liquid.  Oral phase was mildly impaired with extended bolus prep time with regular solid due to no upper dentition and also cognitive deficits, but adequate.  No overt s/s aspiration or other pharyngeal phase dysphagia with any trial.  Pt. is at increased risk of malnutrition and dehydration due to cognitive deficits and reduced taste/smell with COVID.  Recommend:  1. mechanical soft diet with thin liq as bari,  2. meds whole as bari,  3. aspiration precautions, 4. monitor for sufficient po intake, pt. may need encouragement and assistance,  5. Question for MD:  Is Megace an option?    -TM           Oral Prep Concerns    Oral Prep Concerns  prolonged mastication  -TM        Prolonged Mastication  regular consistencies  -TM           Clinical Impression    Barriers to Overall Progress (SLP)  cognitive deficits  -TM        SLP Swallowing Diagnosis  mild;oral dysphagia  -TM        Functional Impact  risk of malnutrition;risk of dehydration  -TM           Recommendations     Therapy Frequency (Swallow)  evaluation only  -        SLP Diet Recommendation  mechanical soft with no mixed consistencies;thin liquids;nutritional supplements needed  -        Recommended Precautions and Strategies  upright posture during/after eating;general aspiration precautions;assist with feeding;other (see comments) monitor for sufficient po intake  -        Oral Care Recommendations  Oral Care BID/PRN  -        SLP Rec. for Method of Medication Administration  meds crushed;with pudding or applesauce;as tolerated  -        Monitor for Signs of Aspiration  cough;throat clearing;gurgly voice  -        Anticipated Discharge Disposition (SLP)  unknown  -          User Key  (r) = Recorded By, (t) = Taken By, (c) = Cosigned By    Initials Name Effective Dates     Sonia Jackson 03/07/18 -           EDUCATION  The patient has been educated in the following areas:   Dysphagia (Swallowing Impairment) Modified Diet Instruction.    SLP Recommendation and Plan  SLP Swallowing Diagnosis: mild, oral dysphagia  SLP Diet Recommendation: mechanical soft with no mixed consistencies, thin liquids, nutritional supplements needed  Recommended Precautions and Strategies: upright posture during/after eating, general aspiration precautions, assist with feeding, other (see comments)(monitor for sufficient po intake)  SLP Rec. for Method of Medication Administration: meds crushed, with pudding or applesauce, as tolerated     Monitor for Signs of Aspiration: cough, throat clearing, gurgly voice        Anticipated Discharge Disposition (SLP): unknown     Therapy Frequency (Swallow): evaluation only                            Plan of Care Reviewed With: patient  Progress: no change  Outcome Summary: Bedside eval of swallow completed.  Pt. seated upright in bed for po trials.  She was hesitant to accept po trials, but participated with encouragement.  Pt. was given trials of regular solid, puree, and thin liquid.  Oral  phase was mildly impaired with extended bolus prep time with regular solid due to no upper dentition and also cognitive deficits, but adequate.  No overt s/s aspiration or other pharyngeal phase dysphagia with any trial.  Pt. is at increased risk of malnutrition and dehydration due to cognitive deficits and reduced taste/smell with COVID.  Recommend:  1. mechanical soft diet with thin liq as bari,  2. meds whole as bari,  3. aspiration precautions, 4. monitor for sufficient po intake, pt. may need encouragement and assistance,  5. Question for MD:  Is [place] an option?           Time Calculation:   Time Calculation- SLP     Row Name 01/18/21 1400             Time Calculation- SLP    SLP Start Time  1310  -TM      SLP Stop Time  1323  -TM      SLP Time Calculation (min)  13 min  -TM      SLP Received On  01/18/21  -        User Key  (r) = Recorded By, (t) = Taken By, (c) = Cosigned By    Initials Name Provider Type     Sonia Jackson Speech and Language Pathologist          Therapy Charges for Today     Code Description Service Date Service Provider Modifiers Qty    [id number]  ST EVAL ORAL PHARYNG SWALLOW 4 1/18/2021 Sonia Jackson GN 1               Sonia Jackson  1/18/2021

## 2021-01-18 NOTE — CONSULTS
"Adult Nutrition  Assessment/PES    Patient Name:  Tammy Pascual  YOB: 1945  MRN: [id number]  Admit Date:  1/16/2021    Assessment Date:  1/18/2021    Comments:    Recommend:  1. Continue current diet order as medically appropriate and tolerated. Consider adding a renal modification to diet order.   2. Continue to encourage PO intake. Current PO 40% x 5 meals.  3. RD ordered Arginaid BID.  4. Consider a renal multivitamin with minerals daily.     RD to follow pt and available PRN.        Reason for Assessment     Row Name 01/18/21 1307          Reason for Assessment    Reason For Assessment  nurse/nurse practitioner consult     Diagnosis  cardiac disease;renal disease;neurologic conditions;pulmonary disease CAD, Afib, heart failure, COVID, CKD 3, HTN, pneumonia, Alz/dementia     Identified At Risk by Screening Criteria  MST SCORE 2+           Anthropometrics     Row Name 01/18/21 1311          Anthropometrics    Height  167.6 cm (66\")        Ideal Body Weight (IBW)    Ideal Body Weight (IBW) (kg)  59.58         Labs/Tests/Procedures/Meds     Row Name 01/18/21 1310          Labs/Procedures/Meds    Lab Results Reviewed  reviewed, pertinent     Lab Results Comments  High: glucose, BUN, Cr, procal Low: Alb        Medications    Pertinent Medications Reviewed  reviewed, pertinent     Pertinent Medications Comments  lipitor decadron           Estimated/Assessed Needs     Row Name 01/18/21 1311          Calculation Measurements    Weight Used For Calculations  62.1 kg (136 lb 14.5 oz) actual BW     Height  167.6 cm (66\")        Estimated/Assessed Needs    Additional Documentation  Calorie Requirements (Group);Protein Requirements (Group);[place]-St. Jeor Equation (Group);Fluid Requirements (Group)        Calorie Requirements    Estimated Calorie Need Method  [place]-St Jeor     Estimated Calorie Requirement Comment  [id number]        Du Bo[place]-St. Jeor Equation    RMR (Du Bo[place]-St. Jeor Equation)  1127.75     " BroomeSt. Hallor Activity Factors  -- 1.3 AF        Protein Requirements    Weight Used For Protein Calculations  62.1 kg (136 lb 14.5 oz) actual BW     Est Protein Requirement Amount (gms/kg)  0.8 gm protein 37-50 grams     Estimated Protein Requirements (gms/day)  49.68        Fluid Requirements    Estimated Fluid Requirement Method  [place]-Segar Formula     David-Segar Method (over 20 kg)  2742         Nutrition Prescription Ordered     Row Name 01/18/21 1314          Nutrition Prescription PO    Current PO Diet  Regular     Common Modifiers  Cardiac         Evaluation of Received Nutrient/Fluid Intake     Row Name 01/18/21 1315          PO Evaluation    Number of Days PO Intake Evaluated  2 days     Number of Meals  5     % PO Intake  40               Problem/Interventions:  Problem 1     Row Name 01/18/21 1316          Nutrition Diagnoses Problem 1    Problem 1  Increased Nutrient Needs     Macronutrient  Fluid;Protein     Etiology (related to)  Medical Diagnosis     Infectious Disease  Other (comment) COVID 19     Signs/Symptoms (evidenced by)  Report/Observation     Reported/Observed By  MD               Intervention Goal     Row Name 01/18/21 1321          Intervention Goal    General  Improved nutrition related lab(s);Meet nutritional needs for age/condition     PO  Meet estimated needs;Increase intake     Weight  Maintain weight         Nutrition Intervention     Row Name 01/18/21 1321          Nutrition Intervention    RD/Tech Action  Encourage intake;Follow Tx progress;Recommend/ordered     Recommended/Ordered  Supplement         Nutrition Prescription     Row Name 01/18/21 1321          Nutrition Prescription PO    PO Prescription  Begin/change diet;Begin/change supplement     Begin/Change Diet to  Regular     Supplement  Other (comment) Arginaid BID     Supplement Frequency  2 times a day     Common Modifiers  Renal;Cardiac     New PO Prescription Ordered?  No, recommended        Other Orders     Obtain Weight  Daily     Obtain Weight Ordered?  No, recommended     Supplement  Vitamin mineral supplement Renal     Supplement Ordered?  No, recommended         Education/Evaluation     Row Name 01/18/21 5383          Education    Education  Education not appropriate at this time     Please explain  Other (comment) Alzheimer's, Isolation status        Monitor/Evaluation    Monitor  Per protocol;I&O;PO intake;Supplement intake;Pertinent labs;Weight;Skin status           Electronically signed by:  Deborah Ruiz RD  01/18/21 13:35 EST

## 2021-01-18 NOTE — DISCHARGE PLACEMENT REQUEST
"Patient wanted to be referred to Twan Rodriguez.    Sindy Mirza -[phone]      Nima Pascual (75 y.o. Female)     Date of Birth Social Security Number Address Home Phone MRN    1945  [address] [place], [place] [id number] [phone] [id number]    Mormon Marital Status          Hoahaoism Single       Admission Date Admission Type Admitting Provider Attending Provider Department, Room/Bed    1/16/21 Emergency Sweta Dumont DO Gandee, Julie G, DO [facility] MED SURG  3, 326/1    Discharge Date Discharge Disposition Discharge Destination                       Attending Provider: Sweta Dumont DO    Allergies: Cephalosporins, Quinolones, Ciprofloxacin, Keflex [Cephalexin]    Isolation: Enh Drop/Con, Airborne   Infection: COVID (confirmed) (01/16/21)   Code Status: CPR    Ht: 167.6 cm (66\")   Wt: 62.1 kg (136 lb 14.5 oz)    Admission Cmt: None   Principal Problem: Pneumonia due to COVID-19 virus [U07.1,J12.82]                 Active Insurance as of 1/16/2021     Primary Coverage     Payor Plan Insurance Group Employer/Plan Group    MEDICARE MEDICARE A & B      Payor Plan Address Payor Plan Phone Number Payor Plan Fax Number Effective Dates    [address] [phone]  12/1/2009 - None Entered    [facility] [id number]       Subscriber Name Subscriber Birth Date Member ID       NIMA PASCUAL 1945 [id number]           Secondary Coverage     Payor Plan Insurance Group Employer/Plan Group    PH[place]      Payor Plan Address Payor Plan Phone Number Payor Plan Fax Number Effective Dates    ATTN CLAIMS DEPT [phone]  4/1/2011 - None Entered    [address]       [facility] [address]       Subscriber Name Subscriber Birth Date Member ID       NIMA PASCUAL 1945 [id number]                 Emergency Contacts      (Rel.) Home Phone Work Phone Mobile Phone    Ashley blunt -- -- [phone]    Milton PASCUAL (Power of ) " [phone] -- [phone]    malcolm cisse (Friend) [phone] -- --    JEIMYCOLLINS (Other) [phone] -- --            Problem List           Codes Noted - Resolved       Hospital    Syncope ICD-10-CM: R55  ICD-9-CM: 780.2 1/16/2021 - Present    * (Principal) Pneumonia due to COVID-19 virus ICD-10-CM: U07.1, J12.82  ICD-9-CM: 480.8 1/16/2021 - Present    Elevated troponin ICD-10-CM: R77.8  ICD-9-CM: 790.6 6/30/2019 - Present    CKD (chronic kidney disease) stage 3, GFR 30-59 ml/min ICD-10-CM: N18.30  ICD-9-CM: 585.3 6/30/2019 - Present    Essential hypertension ICD-10-CM: I10  ICD-9-CM: 401.9 5/24/2019 - Present    Dementia of the Alzheimer's type with late onset without behavioral disturbance (CMS/[facility]) ICD-10-CM: G30.1, F02.80  ICD-9-CM: 331.0, 294.10 5/24/2019 - Present    Hypothyroidism (acquired) ICD-10-CM: E03.9  ICD-9-CM: 244.9 4/7/2019 - Present    Paroxysmal atrial fibrillation (CMS/HCC) ICD-10-CM: I48.0  ICD-9-CM: 427.31 3/14/2017 - Present       Non-Hospital    Pneumonia of left lung due to infectious organism ICD-10-CM: J18.9  ICD-9-CM: 486 11/12/2020 - Present    Pneumonia of right lower lobe due to infectious organism ICD-10-CM: J18.9  ICD-9-CM: 486 6/30/2019 - Present    Acute metabolic encephalopathy ICD-10-CM: G93.41  ICD-9-CM: 348.31 6/30/2019 - Present    Iron deficiency anemia, unspecified ICD-10-CM: D50.9  ICD-9-CM: 280.9 5/30/2019 - Present    Acute renal failure superimposed on chronic kidney disease (CMS/HCC) ICD-10-CM: N17.9, N18.9  ICD-9-CM: 584.9, 585.9 5/24/2019 - Present    Hyponatremia ICD-10-CM: E87.1  ICD-9-CM: 276.1 5/24/2019 - Present    Anemia, chronic disease ICD-10-CM: D63.8  ICD-9-CM: 285.29 5/24/2019 - Present    Chronic kidney disease, stage IV (severe) (CMS/HCC) ICD-10-CM: N18.4  ICD-9-CM: 585.4 4/8/2019 - Present    Shortness of breath ICD-10-CM: R06.02  ICD-9-CM: 786.05 4/7/2019 - Present    Acute pulmonary edema (CMS/HCC) ICD-10-CM: J81.0  ICD-9-CM: 518.4 4/7/2019  - Present    Obstructive sleep apnea ICD-10-CM: G47.33  ICD-9-CM: 327.23 2019 - Present    Hypertensive urgency ICD-10-CM: I16.0  ICD-9-CM: 401.9 2019 - Present    Flash pulmonary edema (CMS/HCC) ICD-10-CM: J81.0  ICD-9-CM: 518.4 2019 - Present    Occult blood positive stool ICD-10-CM: R19.5  ICD-9-CM: 792.1 2017 - Present    Malignant neoplasm of upper-outer quadrant of right breast in female, estrogen receptor positive (CMS/HCC) ICD-10-CM: C50.411, Z17.0  ICD-9-CM: 174.4, V86.0 3/14/2017 - Present    Pulmonary nodules ICD-10-CM: R91.8  ICD-9-CM: 793.19 3/14/2017 - Present    Spinal stenosis, lumbar region, with neurogenic claudication ICD-10-CM: M48.062  ICD-9-CM: 724.03 2016 - Present             History & Physical      Jayy Mason MD at 21 [address] [place] [address]   HISTORY AND PHYSICAL      Name:  Tammy Pascual   Age:  75 y.o.  Sex:  female  :  1945  MRN:  [id number]   Visit Number:  [id number]  Admission Date:  2021  Date Of Service:  21  Primary Care Physician:  Sravani Grijalva MD    Chief Complaint:     Episode of altered mental status.    History Of Presenting Illness:      Ms. Pascual is a 75-year-old female from [facility] living [facility] with history of Alzheimer's dementia, chronic kidney disease, diastolic heart failure, atrial fibrillation on apixaban, coronary artery disease was sent from the assisted living facility by EMS with an episode of unresponsiveness that lasted for about 2 to 3 minutes.  Unfortunately, patient has dementia and is unable to give me any detailed history.  She is currently feeling better and denies any chest pain.  She denies any shortness of breath.  As per the ER physician, when the patient arrived to the emergency room she was alert and answering questions but was not able to tell why she was in the emergency room.    In the emergency room, her temperature was 98.1, pulse 66, blood  pressure 186/70 and pulse oxygen saturation was 97% on room air.  Blood work done in the emergency room including CMP and CBC was remarkable for creatinine of 1.67 (baseline 1.5), potassium 3.4, troponin 0 0.042.  CBC was fairly within normal range.  Urine analysis showed 13-20 RBCs per high-power field but no evidence of nitrites or WBCs.  Chest x-ray showed bilateral pulmonary opacities worrisome for pneumonia.  Her nasal swab for COVID-19 test came back positive.  In view of her generalized weakness, mild elevation of her creatinine level and bilateral pneumonia, she is currently being admitted to the medical floor with telemetry to the COVID-19 unit.    Review Of Systems:     General ROS: Denies any fevers.  Respiratory ROS: No history of cough or shortness of breath.   Cardiovascular ROS: No history of chest pain or palpitations.  Gastrointestinal ROS: No history of nausea and vomiting.  Genitourinary ROS: No history of dysuria or hematuria.  Neurological ROS: As per history of presenting illness.  I am unable to obtain complete review of systems.     Past Medical History:    Past Medical History:   Diagnosis Date   • Alzheimer's dementia (CMS/HCC) 04/07/2019   • Anemia    • Arthritis    • Breast cancer (CMS/HCC)     naveed mastectomy   • Cancer (CMS/HCC)     breast   • Congestive heart failure (CMS/HCC)    • Diabetes mellitus (CMS/HCC)     DIET CONTROLLED   • Disease of thyroid gland    • Hearing loss    • Heart disease    • Hyperlipidemia    • Hypertension    • Kidney disease    • Sleep apnea      Past Surgical history:    Past Surgical History:   Procedure Laterality Date   • BACK SURGERY  2015   • BREAST BIOPSY  1998   • BREAST EXCISIONAL BIOPSY Left 02/08/1999   • BREAST RECONSTRUCTION Bilateral    • CAROTID ARTERY ANGIOPLASTY Right 2008    ([facility])   • CATARACT EXTRACTION Bilateral    • COLONOSCOPY N/A 8/17/2017    Procedure: COLONOSCOPY;  Surgeon: Sindy Clement MD;  Location: [facility]  ENDOSCOPY;  Service:    • HYSTERECTOMY     • MASTECTOMY Bilateral      Social History:    Social History     Socioeconomic History   • Marital status: Single     Spouse name: Not on file   • Number of children: Not on file   • Years of education: Not on file   • Highest education level: Not on file   Tobacco Use   • Smoking status: Former Smoker     Quit date: 2003     Years since quittin.5   • Smokeless tobacco: Never Used   Substance and Sexual Activity   • Alcohol use: No     Frequency: Never   • Drug use: No   • Sexual activity: Not Currently     Family History:    Family History   Problem Relation Age of Onset   • Cancer Mother         breast   • Diabetes Mother    • Heart disease Father    • Stroke Father    • Hypertension Father    • Breast cancer Maternal Grandmother    • Breast cancer Paternal Grandmother      Allergies:      Cephalosporins, Quinolones, Ciprofloxacin, and Keflex [cephalexin]    Home Medications:    Prior to Admission Medications     Prescriptions Last Dose Informant Patient Reported? Taking?    acetaminophen (TYLENOL) 325 MG tablet   Yes No    Take 650 mg by mouth Every 6 (Six) Hours As Needed for Mild Pain (1-3).    amLODIPine (NORVASC) 5 MG tablet   No No    Take 1 tablet by mouth Daily.    apixaban (ELIQUIS) 2.5 MG tablet tablet   No No    Take 1 tablet by mouth Every 12 (Twelve) Hours.    atorvastatin (LIPITOR) 40 MG tablet  Self Yes No    Take 40 mg by mouth Every Night.    clopidogrel (PLAVIX) 75 MG tablet   No No    Take 1 tablet by mouth Daily.    donepezil (ARICEPT) 10 MG tablet   No No    TAKE 1 TABLET BY MOUTH  EVERY NIGHT    ferrous sulfate 325 (65 FE) MG tablet  Pharmacy Yes No    Take 325 mg by mouth Daily With Breakfast.    gabapentin (NEURONTIN) 600 MG tablet   No No    Take 1 tablet by mouth 2 (Two) Times a Day.    isosorbide mononitrate (IMDUR) 30 MG 24 hr tablet   No No    Take 1 tablet by mouth Daily.    memantine (NAMENDA) 10 MG tablet  Pharmacy Yes No     Take 10 mg by mouth 2 (Two) Times a Day.    morphine 20 MG/ML concentrated solution   No No    Take 0.25 mL by mouth Every 3 (Three) Hours As Needed for Severe Pain .           ED Medications:    Medications   sodium chloride 0.9 % flush 10 mL (has no administration in time range)     Vital Signs:    Temp:  [98.1 °F (36.7 °C)] 98.1 °F (36.7 °C)  Heart Rate:  [58-66] 62  Resp:  [17-18] 18  BP: (156-186)/() 167/101        01/16/21  0954 01/16/21  1340   Weight: 72.6 kg (160 lb) 62.1 kg (136 lb 14.5 oz)     Body mass index is 22.1 kg/m².    Physical Exam:    General Appearance:  Alert and cooperative, not in any acute distress.   Head:  Atraumatic and normocephalic, without obvious abnormality.   Eyes:          Conjunctivae and sclerae normal, no Icterus. No pallor. Extraocular movements are within normal limits.   Ears:  Ears appear intact with no abnormalities noted.   Throat: No oral lesions, no thrush, oral mucosa moist.   Neck: Supple, trachea midline, no thyromegaly, no carotid bruit.   Back:   No kyphoscoliosis present. No tenderness to palpation,   range of motion normal.   Lungs:   Chest shape is normal. Breath sounds heard bilaterally equally.  No crackles or wheezing. No Pleural rub or bronchial breathing.   Heart:  Normal S1 and S2, no murmur, no gallop, no rub. No JVD.   Abdomen:   Normal bowel sounds, no masses, no organomegaly. Soft, nontender, nondistended, no guarding, no rebound tenderness.  Midline surgical scar noted in the lower abdomen.  Seborrheic keratosis noted on the abdominal wall.   Extremities: Moves all extremities, no edema, no cyanosis, no clubbing.   Pulses: Pulses palpable and equal bilaterally.   Skin: No bleeding, bruising or rash.   Neurologic: Alert and oriented to person. Moves all four limbs equally. No tremors. No facial asymmetry.     Laboratory data:    I have reviewed the labs done in the emergency room.    Results from last 7 days   Lab Units 01/16/21  1118   SODIUM  mmol/L 138   POTASSIUM mmol/L 3.4*   CHLORIDE mmol/L 103   CO2 mmol/L 25.3   BUN mg/dL 20   CREATININE mg/dL 1.67*   CALCIUM mg/dL 9.1   BILIRUBIN mg/dL 0.9   ALK PHOS U/L 77   ALT (SGPT) U/L 33   AST (SGOT) U/L 39*   GLUCOSE mg/dL 142*     Results from last 7 days   Lab Units 01/16/21  1011   WBC 10*3/mm3 3.98   HEMOGLOBIN g/dL 12.7   HEMATOCRIT % 41.3   PLATELETS 10*3/mm3 157         Results from last 7 days   Lab Units 01/16/21  1118   TROPONIN T ng/mL 0.042*       Results from last 7 days   Lab Units 01/16/21  1011   COLOR UA  Yellow   GLUCOSE UA  Negative   KETONES UA  Negative   LEUKOCYTES UA  Negative   PH, URINE  6.0   BILIRUBIN UA  Negative   UROBILINOGEN UA  1.0 E.U./dL     EKG:      EKG done in the emergency room was reviewed by me.  Significant baseline artifact noted.  Patient likely has sinus rhythm at 66 bpm.  Normal axis.  Nonspecific ST-T changes noted in the inferior limb leads and lateral chest leads.    Radiology:    Imaging Results (Last 72 Hours)     Procedure Component Value Units Date/Time    XR Chest 1 View [[id number]] Collected: 01/16/21 1234     Updated: 01/16/21 1237    Narrative:      PROCEDURE: XR CHEST 1 VW-     HISTORY: Weak/Dizzy/AMS triage protocol        COMPARISON: 12/29/2020.     FINDINGS:  The heart size is normal. The mediastinum is normal. There  are mild pulmonary opacities worrisome for bilateral pneumonia. There is  no pneumothorax. The bony thorax in intact.       Impression:      Bilateral pulmonary opacities worrisome for pneumonia.           This report was finalized on 1/16/2021 12:35 PM by Gatito Nunez MD.        Assessment:    1.  Bilateral pneumonia secondary to COVID-19, POA.  2.  History of transient unresponsiveness prior to admission, resolved.  3.  Chronic kidney disease stage III.  4.  Paroxysmal atrial fibrillation on apixaban.  5.  Essential hypertension.  6.  Alzheimer's dementia.  7.  Hypokalemia, POA.  8.  Chronic diastolic heart failure-no evidence of  exacerbation.    Plan:    Ms. Pascual is currently being admitted to the medical floor with telemetry to the COVID-19 unit.  She does have bilateral pneumonia secondary to COVID-19 but fortunately is currently on room air.  At this time she does not require remdesivir but I will place her on dexamethasone for 5 days.    Patient does have mild elevation of troponin levels and we will repeat troponins today.  She is denying any chest pain and EKG does not show any ST-T changes.  She will be placed on gentle IV hydration with normal saline for 1 L.  Her home medications including apixaban will be continued.  She will be started on physical therapy from tomorrow.  Her CODE STATUS will be full code.  Further recommendations depend upon her clinical course.  Discussed with nursing staff.    Advance Care Planning      ACP discussion was declined by the patient. Patient does not have an advance directive, declines further assistance.      Jayy Mason MD  01/16/21  14:46 EST    Dictated utilizing Dragon dictation.    Electronically signed by Jayy Mason MD at 01/16/21 1645       Oxygen Therapy (last day)     Date/Time   SpO2   Device (Oxygen Therapy)   Flow (L/min)   Oxygen Concentration (%)   ETCO2 (mmHg)    01/18/21 0830   100   room air   --   --   --    01/18/21 0350   98   room air   --   --   --    01/17/21 2357   100   room air   --   --   --    01/17/21 2034   99   room air   --   --   --    01/17/21 2030   --   room air   --   --   --    01/17/21 1600   --   room air   --   --   --    01/17/21 1500   100   --   --   --   --    01/17/21 1200   --   room air   --   --   --    01/17/21 1130   98   room air   --   --   --    01/17/21 0819   99   room air   --   --   --    01/17/21 0400   --   room air   --   --   --                Current Facility-Administered Medications   Medication Dose Route Frequency Provider Last Rate Last Admin   • acetaminophen (TYLENOL) tablet 650 mg  650 mg Oral Q4H PRN Jayy Mason MD         Or   • acetaminophen (TYLENOL) 160 MG/5ML solution 650 mg  650 mg Oral Q4H PRN Jayy Mason MD        Or   • acetaminophen (TYLENOL) suppository 650 mg  650 mg Rectal Q4H PRN Jayy Mason MD       • amLODIPine (NORVASC) tablet 10 mg  10 mg Oral Q24H Jayy Mason MD   10 mg at 01/18/21 0832   • apixaban (ELIQUIS) tablet 2.5 mg  2.5 mg Oral Q12H Jayy Mason MD   2.5 mg at 01/18/21 0832   • atorvastatin (LIPITOR) tablet 40 mg  40 mg Oral Nightly Jayy Mason MD   40 mg at 01/17/21 2017   • clopidogrel (PLAVIX) tablet 75 mg  75 mg Oral Daily Jayy Mason MD   75 mg at 01/18/21 0832   • dexamethasone (DECADRON) tablet 6 mg  6 mg Oral Daily With Breakfast Jayy Mason MD   6 mg at 01/18/21 0831   • donepezil (ARICEPT) tablet 10 mg  10 mg Oral Nightly Jayy Mason MD   10 mg at 01/17/21 2017   • doxycycline (VIBRAMYCIN) 100 mg in sodium chloride 0.9 % 250 mL IVPB  100 mg Intravenous Q12H Jayy Mason  mL/hr at 01/18/21 0830 100 mg at 01/18/21 0830   • ferrous sulfate EC tablet 324 mg  324 mg Oral Daily With Breakfast Jayy Mason MD   324 mg at 01/18/21 0832   • gabapentin (NEURONTIN) capsule 300 mg  300 mg Oral Q12H Jayy Mason MD   300 mg at 01/18/21 0831   • isosorbide mononitrate (IMDUR) 24 hr tablet 30 mg  30 mg Oral Q24H Jayy Mason MD   30 mg at 01/18/21 0831   • LORazepam (ATIVAN) injection 0.5 mg  0.5 mg Intravenous Once PRN Tiago Argueta, DO       • memantine (NAMENDA) tablet 10 mg  10 mg Oral Q12H Jayy Maosn MD   10 mg at 01/18/21 0831   • ondansetron (ZOFRAN) injection 4 mg  4 mg Intravenous Q6H PRN Jayy Mason MD       • sodium chloride 0.9 % flush 10 mL  10 mL Intravenous PRN Emergency, Triage Protocol, MD       • sodium chloride 0.9 % flush 3 mL  3 mL Intravenous Q12H Jayy Mason MD   3 mL at 01/18/21 0831   • sodium chloride 0.9 % flush 3-10 mL  3-10 mL Intravenous PRN Jayy Mason MD            Physician Progress Notes (last 48 hours) (Notes from 01/16/21 0915 through  21 0915)      Jayy Mason MD at 21 1300                [facility]    [facility]    Name:  Tammy Pascual   Age:  75 y.o.  Sex:  female  :  1945  MRN:  [id number]   Visit Number:  [id number]  Admission Date:  2021  Date Of Service:  21  Primary Care Physician:  Sravani Grijalva MD     LOS: 0 days :  Patient Care Team:  Sravani Grijalva MD as PCP - [facility], Luis Fernando Alonzo MD as Consulting Physician (Cardiology)  Sindy Clement MD as Surgeon (General Surgery):    Chief Complaint:      Confusion.    Subjective / Interval History:     Ms. Pascual was seen and examined this morning.  She is currently lying down on the bed and is comfortable at rest.  She is currently on room air and saturating in the low 90s.  Unfortunately, she has dementia and confusion and at times, refusing medications and diet.  She was also noted to have elevated blood pressures through the night.  She was admitted from the emergency room yesterday with history of altered mental status at the assisted living facility.  Patient did not have any hypoxia but was noted to have COVID-19 test positive.  She may have to go to long-term nursing home facility.    Review of Systems:     I am unable to obtain due to her confusion.  She states that she does not know why she is in the hospital.    Vital Signs:    Temp:  [97.1 °F (36.2 °C)-98.6 °F (37 °C)] 98.3 °F (36.8 °C)  Heart Rate:  [37-69] 66  Resp:  [16-18] 18  BP: (151-181)/() 151/90    Intake and output:    I/O last 3 completed shifts:  In: 1200 [I.V.:1200]  Out: 0   I/O this shift:  In: 445 [P.O.:195; IV Piggyback:250]  Out: -     Physical Examination:    General Appearance:  Alert and cooperative, not in any acute distress.   Head:  Atraumatic and normocephalic, without obvious abnormality.   Eyes:          Conjunctivae and sclerae normal, no Icterus. No pallor. Extraocular movements are within normal limits.   Neck: Supple,  trachea midline, no thyromegaly, no carotid bruit.   Lungs:   Chest shape is normal. Breath sounds heard bilaterally equally.  No crackles or wheezing. No pleural rub or bronchial breathing.  Right mastectomy scar noted.   Heart:  Normal S1 and S2, no murmur, no gallop, no rub. No JVD   Abdomen:   Normal bowel sounds, no masses, no organomegaly. Soft, nontender, nondistended, no guarding, no rebound tenderness. Midline surgical scar noted in the lower abdomen.  Seborrheic keratosis noted on the abdominal wall.   Extremities: Moves all extremities, no edema, no cyanosis, no clubbing.   Skin: No bleeding, bruising or rash.   Neurologic: Awake, but pleasantly confused. Moves all 4 extremities equally.     Laboratory results:    Results from last 7 days   Lab Units 01/17/21  0551 01/16/21  1118   SODIUM mmol/L 140 138   POTASSIUM mmol/L 4.0 3.4*   CHLORIDE mmol/L 103 103   CO2 mmol/L 23.5 25.3   BUN mg/dL 24* 20   CREATININE mg/dL 1.62* 1.67*   CALCIUM mg/dL 9.2 9.1   BILIRUBIN mg/dL  --  0.9   ALK PHOS U/L  --  77   ALT (SGPT) U/L  --  33   AST (SGOT) U/L  --  39*   GLUCOSE mg/dL 164* 142*     Results from last 7 days   Lab Units 01/17/21  0551 01/16/21  1011   WBC 10*3/mm3 3.19* 3.98   HEMOGLOBIN g/dL 11.4* 12.7   HEMATOCRIT % 34.4 41.3   PLATELETS 10*3/mm3 181 157         Results from last 7 days   Lab Units 01/17/21  0551 01/16/21  1705 01/16/21  1118   TROPONIN T ng/mL 0.021 0.034* 0.042*     I have reviewed the patient's laboratory results.    Radiology results:    Imaging Results (Last 24 Hours)     ** No results found for the last 24 hours. **        Medication Review:     I have reviewed the patient's active and prn medications.     Problem List:      Pneumonia due to COVID-19 virus    Paroxysmal atrial fibrillation (CMS/HCC)    Hypothyroidism (acquired)    Essential hypertension    Dementia of the Alzheimer's type with late onset without behavioral disturbance (CMS/HCC)    Elevated troponin    CKD (chronic  kidney disease) stage 3, GFR 30-59 ml/min    Syncope    Assessment:    1.  Bilateral pneumonia secondary to COVID-19, POA.  2.  History of transient unresponsiveness prior to admission, resolved.  3.  Chronic kidney disease stage III.  4.  Paroxysmal atrial fibrillation on apixaban.  5.  Essential hypertension.  6.  Alzheimer's dementia.  7.  Hypokalemia, POA.  8.  Chronic diastolic heart failure-no evidence of exacerbation.    Plan:    Ms. Pascual is currently hemodynamically stable and saturating in the 90s on room air.  She does have COVID-19 infection but is fairly asymptomatic.  We will continue her on dexamethasone for 5 days.  She was noted to have elevated procalcitonin levels this morning and I will give her 5 days of doxycycline.    Patient did have borderline elevated troponin levels but they have trended down.  She denies any chest pain.  No further treatment is necessary at this time.  She will be continued on her home medications including apixaban.  Her blood pressures are slightly elevated today and I will increase her amlodipine dose to 10 mg.    Patient lives at [facility] living [facility].  And I suspect that she may not be able to go back there as she is unable to care for herself.  She may benefit from going to long-term care facility.  Discussed with case management services.    Jayy Mason MD  01/17/21  13:00 EST    Dictated utilizing Dragon dictation.      Electronically signed by Jayy Mason MD at 01/17/21 1332       Consult Notes (last 24 hours) (Notes from 01/17/21 0915 through 01/18/21 0915)    No notes of this type exist for this encounter.         Nutrition Notes (last 24 hours) (Notes from 01/17/21 0915 through 01/18/21 0915)    No notes exist for this encounter.            Physical Therapy Notes (last 24 hours) (Notes from 01/17/21 0915 through 01/18/21 0915)      Tracie Gorman PT at 01/17/21 1400  Version 1 of 1       Goal Outcome Evaluation:  Plan of Care Reviewed  With: patient     Outcome Summary: PT evaluation completed bedside. Pt was oriented to self only and expressed fear of falling when assisted to EOB. Pt did assist with getting BLEs off EOB, needing min assist to complete and mod assist to return to supine. Mod assist with brief standing trial with pt expressing fear again. Pt presents with deficits in balance, strength, and endurance. She is expected to improve her functional mobility with continued PT services prior to d/c. Pt could not provide PMH and whether she used a walker or not. Will plan to try rolling walker with pt during next PT treatment.    Electronically signed by Tracie Gorman, PT at 21 1444     Tracie Gorman, PT at 21 1400  Version 1 of 1         Patient Name: Tammy Pascual  : 1945    MRN: [id number]                              Today's Date: 2021       Admit Date: 2021    Visit Dx:     ICD-10-CM ICD-9-CM   1. Syncope, unspecified syncope type  R55 780.2   2. COVID-19  U07.1 079.89   3. Elevated troponin  R77.8 790.6     Patient Active Problem List   Diagnosis   • Spinal stenosis, lumbar region, with neurogenic claudication   • Malignant neoplasm of upper-outer quadrant of right breast in female, estrogen receptor positive (CMS/HCC)   • Pulmonary nodules   • Paroxysmal atrial fibrillation (CMS/HCC)   • Occult blood positive stool   • Shortness of breath   • Acute pulmonary edema (CMS/HCC)   • Obstructive sleep apnea   • Hypertensive urgency   • Hypothyroidism (acquired)   • Flash pulmonary edema (CMS/HCC)   • Chronic kidney disease, stage IV (severe) (CMS/HCC)   • Acute renal failure superimposed on chronic kidney disease (CMS/HCC)   • Hyponatremia   • Essential hypertension   • Anemia, chronic disease   • Dementia of the Alzheimer's type with late onset without behavioral disturbance (CMS/HCC)   • Iron deficiency anemia, unspecified   • Pneumonia of right lower lobe due to infectious organism   • Elevated troponin   •  Acute metabolic encephalopathy   • CKD (chronic kidney disease) stage 3, GFR 30-59 ml/min   • Pneumonia of left lung due to infectious organism   • Syncope   • Pneumonia due to COVID-19 virus     Past Medical History:   Diagnosis Date   • Alzheimer's dementia (CMS/HCC) 04/07/2019   • Anemia    • Arthritis    • Breast cancer (CMS/HCC)     naveed mastectomy   • Cancer (CMS/HCC)     breast   • Congestive heart failure (CMS/HCC)    • Diabetes mellitus (CMS/HCC)     DIET CONTROLLED   • Disease of thyroid gland    • Hearing loss    • Heart disease    • Hyperlipidemia    • Hypertension    • Kidney disease    • Sleep apnea      Past Surgical History:   Procedure Laterality Date   • BACK SURGERY  2015   • BREAST BIOPSY  1998   • BREAST EXCISIONAL BIOPSY Left 02/08/1999   • BREAST RECONSTRUCTION Bilateral    • CAROTID ARTERY ANGIOPLASTY Right 2008    ([facility])   • CATARACT EXTRACTION Bilateral    • COLONOSCOPY N/A 8/17/2017    Procedure: COLONOSCOPY;  Surgeon: Sindy Clement MD;  Location: [facility] ENDOSCOPY;  Service:    • HYSTERECTOMY     • MASTECTOMY Bilateral 2010     General Information     Row Name 01/17/21 1400          Physical Therapy Time and Intention    Document Type  evaluation  -TW     Mode of Treatment  physical therapy  -TW     Row Name 01/17/21 1400          General Information    Patient Profile Reviewed  yes  -TW     Prior Level of Function  independent:;all household mobility Pt lives in assisted living and says she got around without help but could not tell therapist if she used an assistive device or not.  -TW     Existing Precautions/Restrictions  fall  -TW     Barriers to Rehab  cognitive status;medically complex  -TW     Row Name 01/17/21 1400          Living Environment    Lives With  facility resident assistive living facility  -TW     Row Name 01/17/21 1400          Home Main Entrance    Number of Stairs, Main Entrance  none  -TW     Row Name 01/17/21 1400          Stairs Within  "Home, Primary    Number of Stairs, Within Home, Primary  none  -TW     Row Name 01/17/21 1400          Cognition    Orientation Status (Cognition)  oriented to;person;verbal cues/prompts needed for orientation Pt oriented to self only. Pt not able to remember where she was or why she came to bed here. Pt did state \"I'm just so tired.\" and \"I'm not well enough to get OOB.\"  -     Row Name 01/17/21 1400          Safety Issues, Functional Mobility    Safety Issues Affecting Function (Mobility)  safety precaution awareness;safety precautions follow-through/compliance;insight into deficits/self-awareness;awareness of need for assistance  -TW     Impairments Affecting Function (Mobility)  balance;strength;endurance/activity tolerance  -TW     Comment, Safety Issues/Impairments (Mobility)  Pt expresses fear of fallin off EOB in sitting but demonstrated WFLs sitting balance.  -TW       User Key  (r) = Recorded By, (t) = Taken By, (c) = Cosigned By    Initials Name Provider Type    TW Tracie Gorman, PT Physical Therapist        Mobility     Row Name 01/17/21 1400          Bed Mobility    Bed Mobility  supine-sit;sit-supine  -TW     Supine-Sit [place] (Bed Mobility)  verbal cues;minimum assist (75% patient effort)  -TW     Sit-Supine [place] (Bed Mobility)  moderate assist (50% patient effort);verbal cues  -TW     Assistive Device (Bed Mobility)  head of bed elevated  -TW     Comment (Bed Mobility)  Extended time needed to complete tasks as well as reassurance that pt was safe.  -     Row Name 01/17/21 1400          Transfers    Comment (Transfers)  Pt expressed great fear of falling when coming to stand, yelling out, \"I can't, I can't. It's too soon.\"  -     Row Name 01/17/21 1400          Bed-Chair Transfer    Bed-Chair [place] (Transfers)  unable to assess  -     Row Name 01/17/21 1400          Sit-Stand Transfer    Sit-Stand [place] (Transfers)  moderate assist (50% patient effort);verbal " cues;nonverbal cues (demo/gesture)  -TW     Assistive Device (Sit-Stand Transfers)  -- gt belt. Pt would benefit from trial with rolling walker for next session.  -TW     Row Name 01/17/21 1400          Gait/Stairs (Locomotion)    [place] Level (Gait)  unable to assess  -TW       User Key  (r) = Recorded By, (t) = Taken By, (c) = Cosigned By    Initials Name Provider Type    TW Tracie Gorman, PT Physical Therapist        Obj/Interventions     Row Name 01/17/21 1400          Range of Motion Comprehensive    Comment, General Range of Motion  Grossly WFLs BLE  -TW     Row Name 01/17/21 1400          Strength Comprehensive (MMT)    Comment, General Manual Muscle Testing (MMT) Assessment  pt not able to follow directions for a MMT but did move LE's partial range against gravity while in bed and with sitting on EOB partial range knee ext B  -TW     Row Name 01/17/21 1400          Balance    Balance Assessment  sitting static balance;standing static balance;sitting dynamic balance  -TW     Static Sitting Balance  WFL;unsupported;sitting, edge of bed Pt does express fear of falling while sitting on EOB  -TW     Dynamic Sitting Balance  WFL;unsupported;sitting, edge of bed  -TW     Static Standing Balance  moderate impairment;supported  -TW     Comment, Balance  Pt able to sit on EOB x 7 min with encouragement and reassurance that she was safe.  -TW       User Key  (r) = Recorded By, (t) = Taken By, (c) = Cosigned By    Initials Name Provider Type    TW Tracie Gorman, PT Physical Therapist        Goals/Plan     Row Name 01/17/21 1400          Bed Mobility Goal 1 (PT)    Activity/Assistive Device (Bed Mobility Goal 1, PT)  bed mobility activities, all  -TW     [place] Level/Cues Needed (Bed Mobility Goal 1, PT)  standby assist  -TW     Time Frame (Bed Mobility Goal 1, PT)  2 weeks  -TW     Progress/Outcomes (Bed Mobility Goal 1, PT)  goal ongoing  -TW     Row Name 01/17/21 1400          Transfer Goal 1 (PT)     Activity/Assistive Device (Transfer Goal 1, PT)  sit-to-stand/stand-to-sit;bed-to-chair/chair-to-bed;toilet;walker, rolling  -TW     [place] Level/Cues Needed (Transfer Goal 1, PT)  standby assist  -TW     Time Frame (Transfer Goal 1, PT)  2 weeks  -TW     Progress/Outcome (Transfer Goal 1, PT)  goal ongoing  -TW     Row Name 01/17/21 1400          Gait Training Goal 1 (PT)    Activity/Assistive Device (Gait Training Goal 1, PT)  gait (walking locomotion);decrease fall risk;increase endurance/gait distance;improve balance and speed;walker, rolling  -TW     [place] Level (Gait Training Goal 1, PT)  standby assist  -TW     Distance (Gait Training Goal 1, PT)  100 ft  -TW     Time Frame (Gait Training Goal 1, PT)  2 weeks  -TW     Progress/Outcome (Gait Training Goal 1, PT)  goal ongoing  -TW     Row Name 01/17/21 1400          Patient Education Goal (PT)    Activity (Patient Education Goal, PT)  LE ther ex 1 x 10  -TW     [place]/Cues/Accuracy (Memory Goal 2, PT)  demonstrates adequately  -TW     Time Frame (Patient Education Goal, PT)  2 weeks  -TW     Barriers (Patient Education Goal, PT)  pt's dementia  -TW     Progress/Outcome (Patient Education Goal, PT)  goal ongoing  -TW       User Key  (r) = Recorded By, (t) = Taken By, (c) = Cosigned By    Initials Name Provider Type    TW Tracie Gorman, PT Physical Therapist        Clinical Impression     Row Name 01/17/21 1400          Pain    Additional Documentation  Pain Scale: FACES Pre/Post-Treatment (Group)  -TW     Row Name 01/17/21 1400          Pain Scale: FACES Pre/Post-Treatment    Pain: FACES Scale, Pretreatment  0-->no hurt  -TW     Posttreatment Pain Rating  0-->no hurt  -TW     Row Name 01/17/21 1400          Plan of Care Review    Plan of Care Reviewed With  patient  -TW     Outcome Summary  PT evaluation completed bedside. Pt was oriented to self only and expressed fear of falling when assisted to EOB. Pt did assist with getting BLEs off EOB,  needing min assist to complete and mod assist to return to supine. Mod assist with brief standing trial with pt expressing fear again. Pt presents with deficits in balance, strength, and endurance. She is expected to improve her functional mobility with continued PT services prior to d/c. Pt could not provide PMH and whether she used a walker or not. Will plan to try rolling walker with pt during next PT treatment.  -TW     Row Name 01/17/21 1400          Therapy Assessment/Plan (PT)    Patient/Family Therapy Goals Statement (PT)  none stated.  -TW     Rehab Potential (PT)  fair, will monitor progress closely Due to dementia, unclear how much pt can participate.  -TW     Criteria for Skilled Interventions Met (PT)  yes;meets criteria  -TW     Predicted Duration of Therapy Intervention (PT)  2 wks  -TW     Row Name 01/17/21 1400          Vital Signs    Pre SpO2 (%)  99  -TW     O2 Delivery Pre Treatment  room air  -TW     Post SpO2 (%)  98  -TW     Pre Patient Position  Supine  -TW     Intra Patient Position  Standing  -TW     Post Patient Position  Supine  -TW     Row Name 01/17/21 1400          Positioning and Restraints    Pre-Treatment Position  in bed  -TW     Post Treatment Position  bed  -TW     In Bed  call light within reach;encouraged to call for assist;exit alarm on;supine;side rails up x3  -TW       User Key  (r) = Recorded By, (t) = Taken By, (c) = Cosigned By    Initials Name Provider Type    TW Tracie Gorman, PT Physical Therapist        Outcome Measures     Row Name 01/17/21 1400          How much help from another person do you currently need...    Turning from your back to your side while in flat bed without using bedrails?  3  -TW     Moving from lying on back to sitting on the side of a flat bed without bedrails?  3  -TW     Moving to and from a bed to a chair (including a wheelchair)?  2  -TW     Standing up from a chair using your arms (e.g., wheelchair, bedside chair)?  2  -TW     Climbing 3-5  steps with a railing?  1  -TW     Row Name 01/17/21 1400          Functional Assessment    Outcome Measure Options  AM-PAC 6 Clicks Basic Mobility (PT)  -TW       User Key  (r) = Recorded By, (t) = Taken By, (c) = Cosigned By    Initials Name Provider Type    TW Tracie Gorman PT Physical Therapist        Physical Therapy Education                 Title: PT OT SLP Therapies (In Progress)     Topic: Physical Therapy (In Progress)     Point: Mobility training (Done)     Learning Progress Summary           Patient Acceptance, E,D, DU,NR by TW at 1/17/2021 1400    Comment: Pt education on technique for bed mobility and for sit to stand transfers. Pt will need further education due to her dementia.                   Point: Home exercise program (Not Started)     Learner Progress:  Not documented in this visit.                      User Key     Initials Effective Dates Name Provider Type Discipline    TW 03/26/19 -  Tracie Gorman PT Physical Therapist PT              PT Recommendation and Plan  Planned Therapy Interventions (PT): balance training, bed mobility training, gait training, home exercise program, patient/family education, strengthening, transfer training  Plan of Care Reviewed With: patient  Outcome Summary: PT evaluation completed bedside. Pt was oriented to self only and expressed fear of falling when assisted to EOB. Pt did assist with getting BLEs off EOB, needing min assist to complete and mod assist to return to supine. Mod assist with brief standing trial with pt expressing fear again. Pt presents with deficits in balance, strength, and endurance. She is expected to improve her functional mobility with continued PT services prior to d/c. Pt could not provide PMH and whether she used a walker or not. Will plan to try rolling walker with pt during next PT treatment.     Time Calculation:   PT Charges     Row Name 01/17/21 1400             Time Calculation    Stop Time  1400  -TW      PT Received On   01/17/21  -ZAIN      PT Goal Re-Cert Due Date  01/27/21  -TW        User Key  (r) = Recorded By, (t) = Taken By, (c) = Cosigned By    Initials Name Provider Type    Tracie Doe PT Physical Therapist        Therapy Charges for Today     Code Description Service Date Service Provider Modifiers Qty    13021386414 HC PT EVAL LOW COMPLEXITY 3 1/17/2021 Tracie Gorman PT GP 1          PT G-Codes  Outcome Measure Options: AM-PAC 6 Clicks Basic Mobility (PT)    Tracie Gorman PT  1/17/2021      Electronically signed by Tracie Gorman PT at 01/17/21 1445       Occupational Therapy Notes (last 24 hours) (Notes from 01/17/21 0915 through 01/18/21 0915)    No notes exist for this encounter.         Speech Language Pathology Notes (last 24 hours) (Notes from 01/17/21 0916 through 01/18/21 0916)    No notes exist for this encounter.

## 2021-01-18 NOTE — PLAN OF CARE
Goal Outcome Evaluation:  Plan of Care Reviewed With: patient  Progress: no change  Outcome Summary: No acute events this shift.  VSS.  Patient has been on room air entire shift with O2 >90%.  Patient remains confused, reorientation provided.  Will continue to monitor.

## 2021-01-18 NOTE — PLAN OF CARE
"Goal Outcome Evaluation:  Plan of Care Reviewed With: patient, family      At beginning of shift, patient found with pulse ox, socks, SCDs, gown, telemetry and IV removed. A&O to self, hyperactive, argumentative and uncooperative. IV replaced, patient redressed and incontinence care completed, patient then became more cooperative and calm. Warm blankets applied to patient which really calmed her. Dr Argueta was called for x1 prn Ativan order but patient did not need dose after other interventions carried out. Bath completed and patient quiet and calm for much of shift. Patient had been pocketing pills for dayshift yesterday so bedtime meds were crushed and given in pudding. Patient still was keeping some pudding in her mouth and needing to be instructed to keep swallowing it all. She swallows water with ease. Speech swallow consult placed to evaluate. Spoke to \"boyfriend/caretaker\" GNIGER Pascual and gave update when he called and also talked to patient on her room phone. Plan is to seek placement at the [facility] unless patient must ultimately return to [facility] as previously residing.   "

## 2021-01-18 NOTE — PROGRESS NOTES
Continued Stay Note  [facility]     Patient Name: Tammy Pascual  MRN: [id number]  Today's Date: 1/18/2021    Admit Date: 1/16/2021    Discharge Plan    Left message with Vero Luna in admissions at [facility] @ 1500 RE: discharge placement referral sent this morning at 0915.    Sindy Mirza RN

## 2021-01-18 NOTE — THERAPY EVALUATION
Pt declined to work with therapy.  Attempted to encourage pt, but continued to decline.  Pt was sleeping upon arrival.

## 2021-01-19 NOTE — PLAN OF CARE
Goal Outcome Evaluation:  Plan of Care Reviewed With: patient  Progress: no change  Outcome Summary: VSS, on room air. Pt remains confused, she stated that she was having a difficult time remembering things but aware of being in the hospital. She is oriented to self and place only. She refused to wear her bipap tonight (MD aware).

## 2021-01-19 NOTE — PROGRESS NOTES
Case Management Discharge Note      Final Note: trnsferred back to [facility]         Selected Continued Care - Admitted Since 1/16/2021     Destination Coordination complete    Service Provider Selected Services Address Phone Fax Patient Preferred    [facility]  Skilled Nursing [address] [address] [phone] --          Durable Medical Equipment    No services have been selected for the patient.              Dialysis/Infusion    No services have been selected for the patient.              Home Medical Care    No services have been selected for the patient.              Therapy    No services have been selected for the patient.              Community Resources    No services have been selected for the patient.                Selected Continued Care - Prior Encounters Includes selections from prior encounters from 10/18/2020 to 1/19/2021    Discharged on 11/17/2020 Admission date: 11/12/2020 - Discharge disposition: Rehab Facility or Unit (DC - External)    Destination     Service Provider Selected Services Address Phone Fax Patient Preferred    [facility]  Skilled Nursing [address] [address] [phone] --                    Transportation Services  Ambulance: [facility]    Final Discharge Disposition Code: 03 - skilled nursing facility (SNF)

## 2021-01-19 NOTE — DISCHARGE SUMMARY
[facility]   DISCHARGE SUMMARY      Name:  Tammy Pascual   Age:  75 y.o.  Sex:  female  :  1945  MRN:  [id number]   Visit Number:  [id number]  Primary Care Physician:  Sravani Grijalva MD  Date of Discharge:  2021  Admission Date:  2021    Discharge Diagnosis:     1.  Bilateral pneumonia secondary to COVID-19, POA, improved  2.  History of transient unresponsiveness prior to admission, resolved.  3.  Chronic kidney disease stage III.  4.  Paroxysmal atrial fibrillation on apixaban.  5.  Essential hypertension.  6.  Alzheimer's dementia.  7.  Hypokalemia, POA.  8.  Chronic diastolic heart failure-no evidence of exacerbation.  9.  Possible Aspiration pneumonitis, unable to specify further, prior to admission        Discharge instructions:   1. Continue nightly CPAP as patient will allow, that she already has.  2. Continue Decadron x 2 more days  3. Continue Doxycycline x 2 more days    History of Present Illness/Hospital Course:  Ms. Pascual is a 75-year-old female from [facility] living [facility] with history of Alzheimer's dementia, chronic kidney disease, diastolic heart failure, atrial fibrillation on apixaban, coronary artery disease was sent from the assisted living facility by EMS with an episode of unresponsiveness that lasted for about 2 to 3 minutes.  Unfortunately, patient has dementia and is unable to give me any detailed history.  She is currently feeling better and denies any chest pain.  She denies any shortness of breath.  As per the ER physician, when the patient arrived to the emergency room she was alert and answering questions but was not able to tell why she was in the emergency room.     In the emergency room, her temperature was 98.1, pulse 66, blood pressure 186/70 and pulse oxygen saturation was 97% on room air.  Blood work done in the emergency room including CMP and CBC was remarkable for creatinine of 1.67 (baseline 1.5), potassium  3.4, troponin 0 0.042.  CBC was fairly within normal range.  Urine analysis showed 13-20 RBCs per high-power field but no evidence of nitrites or WBCs.  Chest x-ray showed bilateral pulmonary opacities worrisome for pneumonia.  Her nasal swab for COVID-19 test came back positive.  In view of her generalized weakness, mild elevation of her creatinine level and bilateral pneumonia, she is currently being admitted to the medical floor with telemetry to the COVID-19 unit.    The patient improved with decadron and doxycycline. She was provided gentle iv fluid as well. For the past couple days now, though she has been eating and drinking better on her own. No further inpatient needs are seen. She is stable improved for discharge back to the nursing facility.    Mentation seems to have improved with adding seroquel at night, as well as reducing her gabapentin dose. I discussed with her POA via phone that I suspect her dementia had worsened, but she appears improved from admission. She does have some mild paranoia, which is improved with verbal comfort and reassurance.     Maintain aspiration precautions. Diet is recommended to be maintained as mechanical soft with thin liquids, with medications crushed and placed in applesauce/pudding. Please monitor patient closely, as she has previously tried to hide/cheek her medications and then spit them out.     She has not required oxygen here. She may have oxygen if needed to maintain oxygen sat >92%. CPAP was ordered here, as she has at facility, though she continues to refuse it.     Case management has been assisting with discharge planning and POA was okay for her to return to [place].     Consults:     Consults     No orders found from 12/18/2020 to 1/17/2021.          Procedures Performed: none              Vital Signs:    Temp:  [96.1 °F (35.6 °C)-98.3 °F (36.8 °C)] 97.7 °F (36.5 °C)  Heart Rate:  [55-68] 65  Resp:  [16-18] 18  BP: (156-166)/(36-63) 163/50    Physical  Exam:      General Appearance:    Elderly lady. Alert, cooperative, in no acute distress   Head:    Normocephalic, without obvious abnormality, atraumatic   Eyes:            Lids and lashes normal, conjunctivae and sclerae normal, no   icterus, no pallor, corneas clear, PERRLA   Ears:    Ears appear intact with no abnormalities noted   Throat:   No oral lesions, no thrush, oral mucosa moist   Neck:   No adenopathy, supple, trachea midline, no thyromegaly, no     carotid bruit, no JVD   Back:     No kyphosis present, no scoliosis present, no skin lesions,       erythema or scars, no tenderness to percussion or                   palpation,   range of motion normal   Lungs:     Clear to auscultation,respirations regular, even and                   unlabored    Heart:    Regular rhythm and normal rate, normal S1 and S2, no            murmur, no gallop, no rub, no click   Breast Exam:    Deferred   Abdomen:     Normal bowel sounds, no masses, no organomegaly, soft        non-tender, non-distended, no guarding, no rebound                 tenderness   Genitalia:    Deferred   Extremities:   Moves all extremities well, no edema, no cyanosis, no              redness   Pulses:   Pulses palpable and equal bilaterally   Skin:   No bleeding, bruising or rash   Lymph nodes:   No palpable adenopathy   Neurologic:   Stable chronic resting tremor, sensation intact, DTR        present and equal bilaterally         Pertinent Lab Results:     Results from last 7 days   Lab Units 01/19/21  0535 01/17/21  0551 01/16/21  1118   SODIUM mmol/L 141 140 138   POTASSIUM mmol/L 3.4* 4.0 3.4*   CHLORIDE mmol/L 105 103 103   CO2 mmol/L 23.7 23.5 25.3   BUN mg/dL 24* 24* 20   CREATININE mg/dL 1.36* 1.62* 1.67*   CALCIUM mg/dL 9.2 9.2 9.1   BILIRUBIN mg/dL 0.7  --  0.9   ALK PHOS U/L 65  --  77   ALT (SGPT) U/L 31  --  33   AST (SGOT) U/L 23  --  39*   GLUCOSE mg/dL 146* 164* 142*     Results from last 7 days   Lab Units 01/19/21  0535  01/17/21  0551 01/16/21  1011   WBC 10*3/mm3 4.29 3.19* 3.98   HEMOGLOBIN g/dL 10.1* 11.4* 12.7   HEMATOCRIT % 31.0* 34.4 41.3   PLATELETS 10*3/mm3 216 181 157         Results from last 7 days   Lab Units 01/17/21  0551 01/16/21  1705 01/16/21  1118   TROPONIN T ng/mL 0.021 0.034* 0.042*                     Results from last 7 days   Lab Units 01/16/21  1011   COLOR UA  Yellow   GLUCOSE UA  Negative   KETONES UA  Negative   LEUKOCYTES UA  Negative   PH, URINE  6.0   BILIRUBIN UA  Negative   UROBILINOGEN UA  1.0 E.U./dL     Pain Management Panel     There is no flowsheet data to display.              Pertinent Radiology Results:    Imaging Results (All)     Procedure Component Value Units Date/Time    XR Chest 1 View [[id number]] Collected: 01/16/21 1234     Updated: 01/16/21 1237    Narrative:      PROCEDURE: XR CHEST 1 VW-     HISTORY: Weak/Dizzy/AMS triage protocol        COMPARISON: 12/29/2020.     FINDINGS:  The heart size is normal. The mediastinum is normal. There  are mild pulmonary opacities worrisome for bilateral pneumonia. There is  no pneumothorax. The bony thorax in intact.       Impression:      Bilateral pulmonary opacities worrisome for pneumonia.           This report was finalized on 1/16/2021 12:35 PM by Gatito Nunez MD.          ECHO:    Results for orders placed during the hospital encounter of 06/29/19   Adult Transthoracic Echo Complete W/ Cont if Necessary Per Protocol    Narrative Technically difficult study   1) Moderate LVH with normal LV systolic function ( EF is over 55%)  2) Severe left atrial enlargement with moderate elevation in LVedp   3) Mild MR   4) Aortic sclerosis with no evidence for stenosis   5) Mild TR with PAsp of 70 mm of hg   6) Normal size and function of the RV   7) Calcified ascending aorta        Condition on Discharge:  Stable        Code status during the hospital stay:  FULL CODE      Discharge Disposition:    Skilled Nursing Facility (DC - External)    Discharge  Medication:       Discharge Medications      New Medications      Instructions Start Date   dexamethasone 6 MG tablet  Commonly known as: DECADRON   6 mg, Oral, Daily With Breakfast   Start Date: January 20, 2021     doxycycline 100 MG enteric coated tablet  Commonly known as: DORYX   100 mg, Oral, 2 Times Daily      gabapentin 300 MG capsule  Commonly known as: NEURONTIN  Replaces: gabapentin 600 MG tablet   300 mg, Oral, Every 12 Hours Scheduled      QUEtiapine 25 MG tablet  Commonly known as: SEROquel   25 mg, Oral, Nightly         Changes to Medications      Instructions Start Date   amLODIPine 10 MG tablet  Commonly known as: NORVASC  What changed:   · medication strength  · how much to take   10 mg, Oral, Every 24 Hours Scheduled   Start Date: January 20, 2021        Continue These Medications      Instructions Start Date   apixaban 2.5 MG tablet tablet  Commonly known as: ELIQUIS   2.5 mg, Oral, Every 12 Hours Scheduled      atorvastatin 40 MG tablet  Commonly known as: LIPITOR   40 mg, Oral, Nightly      clopidogrel 75 MG tablet  Commonly known as: PLAVIX   75 mg, Oral, Daily      donepezil 10 MG tablet  Commonly known as: ARICEPT   10 mg, Oral, Nightly      ferrous sulfate 325 (65 FE) MG tablet   325 mg, Oral, Daily With Breakfast      isosorbide mononitrate 30 MG 24 hr tablet  Commonly known as: IMDUR   30 mg, Oral, Every 24 Hours Scheduled      memantine 10 MG tablet  Commonly known as: NAMENDA   10 mg, Oral, 2 Times Daily         Stop These Medications    gabapentin 600 MG tablet  Commonly known as: NEURONTIN  Replaced by: gabapentin 300 MG capsule     morphine 20 MG/ML concentrated solution        ASK your doctor about these medications      Instructions Start Date   acetaminophen 325 MG tablet  Commonly known as: TYLENOL   650 mg, Oral, Every 6 Hours PRN             Discharge Diet:     Diet Instructions     Diet: Dysphagia; Thin Liquids, No Restrictions; Mechanical Soft      Discharge Diet: Dysphagia     Fluid Consistency: Thin Liquids, No Restrictions    Pureed Options: Mechanical Soft          Activity at Discharge:     Activity Instructions     Activity as Tolerated            Follow-up Appointments:    Future Appointments   Date Time Provider Department Center   1/20/2021  8:00 AM Freida Alanis MD [facility]         Test Results Pending at Discharge: none            Sweta Dumont DO  01/19/21  12:44 EST      Medication risks and benefits were discussed in great detail. The patient reported being satisfied with the current treatment plan and the care delivered while hospitalized.     Time:  I spent 35 minutes preparing discharge counseling and teaching.

## 2021-01-19 NOTE — PROGRESS NOTES
Continued Stay Note  [facility]     Patient Name: Tammy Pascual  MRN: [id number]  Today's Date: 1/19/2021    Admit Date: 1/16/2021    Discharge Plan     Row Name 01/19/21 0847       Plan    Plan  Called [facility] at [facility] They are not able to accept due to a Positive COVID called Kaylen She may return  there  under her medicare days As a Note she first tested positive on 12/17  [place] and had just moved out of isolation when admitted here     Spoke to pt GINGER Pascual He is accepting of returning to [place]     Nursing to call report to [phone] Andra will get the [place] summary           Discharge Codes    No documentation.       Expected Discharge Date and Time     Expected Discharge Date Expected Discharge Time    Jan 20, 2021             Julieta Ferrara RN

## 2021-02-17 NOTE — PROGRESS NOTES
Nursing Home Progress Note        Tano Cevallos DO []  CUONG Bauer []  [address] [place] [address]  Phone: [phone]  Fax: [phone] Freida Alanis MD []  Woodrow Quiroz DO []  Alexandra Pate PA-C [x]   [address] [place] [address]  Phone: [phone]  Fax: [phone]     PATIENT NAME: Tammy Pascual                                                                          YOB: 1945           DATE OF SERVICE: 02/15/2021  FACILITY:  [x] [facility]   [] [facility]   [] [facility]   [] [facility]   [] Other ______________________________________________________________________     CHIEF COMPLAINT:  Increased confusion, heel wound.       HISTORY OF PRESENT ILLNESS:   Ms. Pascual is a 75-year of age female who presents today at request of nursing staff with complaints of increased confusion and heel wound.  Per nursing, patient has had overall weakness, able to sit up in wheelchair for only short amount of time.  Continues with okay appetite.  No fever, vomiting, or diarrhea.  Area of concern to the left heel.  No unilateral edema.  Report of tenderness with palpation.  Upon assessment patient is sitting in wheelchair, is alert with confusion.    PAST MEDICAL & SURGICAL HISTORY:   Past Medical History:   Diagnosis Date   • Alzheimer's dementia (CMS/HCC) 04/07/2019   • Anemia    • Arthritis    • Breast cancer (CMS/[facility])     naveed mastectomy   • Cancer (CMS/HCC)     breast   • Congestive heart failure (CMS/[facility])    • Diabetes mellitus (CMS/[facility])     DIET CONTROLLED   • Disease of thyroid gland    • Hearing loss    • Heart disease    • Hyperlipidemia    • Hypertension    • Kidney disease    • Sleep apnea       Past Surgical History:   Procedure Laterality Date   • BACK SURGERY  2015   • BREAST BIOPSY  1998   • BREAST EXCISIONAL BIOPSY Left 02/08/1999   • BREAST RECONSTRUCTION Bilateral    • CAROTID ARTERY ANGIOPLASTY Right 2008    ([facility])   • CATARACT EXTRACTION  Bilateral    • COLONOSCOPY N/A 2017    Procedure: COLONOSCOPY;  Surgeon: Sindy Clement MD;  Location: [facility] ENDOSCOPY;  Service:    • HYSTERECTOMY     • MASTECTOMY Bilateral          MEDICATIONS:  I have reviewed and reconciled the patients medication list in the patients chart at the skilled nursing facility today.      ALLERGIES:  Allergies   Allergen Reactions   • Cephalosporins Anaphylaxis   • Quinolones Anaphylaxis   • Ciprofloxacin    • Keflex [Cephalexin] Hives         SOCIAL HISTORY:  Social History     Socioeconomic History   • Marital status: Single     Spouse name: Not on file   • Number of children: Not on file   • Years of education: Not on file   • Highest education level: Not on file   Tobacco Use   • Smoking status: Former Smoker     Quit date: 2003     Years since quittin.6   • Smokeless tobacco: Never Used   Substance and Sexual Activity   • Alcohol use: No     Frequency: Never   • Drug use: No   • Sexual activity: Not Currently       FAMILY HISTORY:  Family History   Problem Relation Age of Onset   • Cancer Mother         breast   • Diabetes Mother    • Heart disease Father    • Stroke Father    • Hypertension Father    • Breast cancer Maternal Grandmother    • Breast cancer Paternal Grandmother        REVIEW OF SYSTEMS:  Review of Systems   Unable to perform ROS: Mental status change (limited)   Constitutional: Negative for fever.   Respiratory: Negative for shortness of breath.    Cardiovascular: Negative for chest pain and leg swelling.   Gastrointestinal: Negative for abdominal pain, diarrhea and vomiting.   Skin: Positive for color change and wound.   Psychiatric/Behavioral: Positive for confusion. Negative for agitation. The patient is not nervous/anxious.       PHYSICAL EXAMINATION:     VITAL SIGNS:  /73   Pulse 79   Temp 97.8 °F (36.6 °C)   Resp 20   Wt 60.8 kg (134 lb)   SpO2 96%   BMI 21.63 kg/m²     Physical Exam  Vitals signs and nursing  note reviewed.   Constitutional:       Appearance: She is ill-appearing (chronic. ).      Comments: Frail appearing elderly female.    HENT:      Head: Normocephalic and atraumatic.      Right Ear: External ear normal.      Left Ear: External ear normal.   Eyes:      Conjunctiva/sclera: Conjunctivae normal.      Pupils: Pupils are equal, round, and reactive to light.   Neck:      Musculoskeletal: Normal range of motion and neck supple.   Cardiovascular:      Rate and Rhythm: Normal rate and regular rhythm.      Pulses: Normal pulses.   Pulmonary:      Effort: Pulmonary effort is normal.      Breath sounds: Normal breath sounds.   Abdominal:      General: Bowel sounds are normal.      Palpations: Abdomen is soft.   Musculoskeletal: Normal range of motion.         General: No swelling.   Skin:     General: Skin is warm and dry.      Capillary Refill: Capillary refill takes less than 2 seconds.      Comments: Left heel has area of nonblanchable discoloration.  There is some localized tenderness.  BLE neurovascularly intact.     Neurological:      Mental Status: She is alert. She is disoriented.   Psychiatric:         Mood and Affect: Mood normal.         Behavior: Behavior normal.         RECORDS REVIEW:   N/A.   ASSESSMENT     Diagnoses and all orders for this visit:    1. Dementia of the Alzheimer's type with late onset without behavioral disturbance (CMS/HCC) (Primary)    2. Disorientation    3. Impaired mobility    4. Deep tissue injury      PLAN  Staff report increased confusion overall weakness.  Left heel notable for DTI.  Discussed wound care including pressure reduction and close monitoring.  Will obtain left lower extremity arterial Doppler.  Vitals are reassuring, she has not had fever or hypoxia.  Will obtain CBC, CMP, and UA.  Monitor closely for any acute changes in condition and notify myself or MD if noted.    [x]  Discussed Patient in detail with nursing/staff, addressed all needs today.     [x]  Plan  of Care Reviewed   []  PT/OT Reviewed   []  Order Changes  []  Discharge Plans Reviewed  [x]  Advance Directive on file with Nursing Home.   [x]  POA on file with Nursing Home.    [x]  Code Status listed:  [x]  Full Code   []  DNR    I spent 25 minutes in direct patient care including face to face and floor time.        Alexandra Pate PA-C.  2/17/2021

## 2021-02-24 NOTE — PROGRESS NOTES
[facility] Cardiology OP Consult Note    Tammy Pascual  [id number]  2021    Referred By: Dr. Annabelle MD    Chief Complaint: Peripheral arterial disease    History of Present Illness:    Mrs. Tammy Pascual is a 75 y.o. female who presents to the Cardiology Clinic for evaluation of left lower extremity peripheral arterial disease.  The patient has a past medical history significant for Alzheimer's dementia, hypothyroidism, hypertension, and stage III chronic kidney disease.  She has a past cardiac history significant for paroxysmal atrial fibrillation, chronically anticoagulated with Eliquis as well as chronic diastolic heart failure.  She has a vascular history significant for suspected severe left lower extremity peripheral arterial disease.  Of report, the patient was recently found to have a left heel wound on exam at her nursing facility.  A subsequent left lower extremity arterial duplex on 2021 was suggestive of mild to moderate disease in the CFA and proximal to mid SFA, with monomorphic waveforms suggestive of severe stenosis in the distal SFA.  She has subsequently been referred for consideration of further peripheral arterial testing or intervention.  Currently, the patient is somewhat confused in the setting of Alzheimer's.  She does report a recent left heel wound, which she believes has been healing over approximately the past 2 weeks.  At this time, she denies any right or left lower extremity pain or discomfort.  It appears her ambulation is extremely limited, she denies any lower extremity discomfort with ambulation or symptoms to suggest claudication.  At this time, she has no specific complaints    Past Vascular Testin.  Left lower extremity arterial duplex 2021   1.  Biphasic and monophasic waveforms in the left lower extremity suggestive of mild disease in the CFA, proximal and mid SFA   2.  Waveforms suggestive of severe stenosis in the distal  SFA       Review of Systems:   Review of Systems   Constitutional: Negative for activity change, appetite change, chills, diaphoresis, fatigue, fever, unexpected weight gain and unexpected weight loss.   Eyes: Negative for blurred vision and double vision.   Respiratory: Negative for cough, chest tightness, shortness of breath and wheezing.    Cardiovascular: Negative for chest pain, palpitations and leg swelling.   Gastrointestinal: Negative for abdominal pain, anal bleeding, blood in stool and GERD.   Endocrine: Negative for cold intolerance and heat intolerance.   Genitourinary: Negative for hematuria.   Neurological: Negative for dizziness, syncope, weakness and light-headedness.   Hematological: Does not bruise/bleed easily.   Psychiatric/Behavioral: Negative for depressed mood and stress. The patient is not nervous/anxious.        Past Medical History:   Past Medical History:   Diagnosis Date   • A-fib (CMS/[facility])    • Alzheimer's dementia (CMS/[facility]) 04/07/2019   • Anemia    • Arthritis    • Breast cancer (CMS/[facility])     naveed mastectomy   • Cancer (CMS/[facility])     breast   • Congestive heart failure (CMS/[facility])    • Diabetes mellitus (CMS/[facility])     DIET CONTROLLED   • Disease of thyroid gland    • Hearing loss    • Heart disease    • Hyperlipidemia    • Hypertension    • Kidney disease    • Sleep apnea        Past Surgical History:   Past Surgical History:   Procedure Laterality Date   • BACK SURGERY  2015   • BREAST BIOPSY  1998   • BREAST EXCISIONAL BIOPSY Left 02/08/1999   • BREAST RECONSTRUCTION Bilateral    • CAROTID ARTERY ANGIOPLASTY Right 2008    ([facility])   • CATARACT EXTRACTION Bilateral    • COLONOSCOPY N/A 8/17/2017    Procedure: COLONOSCOPY;  Surgeon: Sindy Clement MD;  Location: [facility] ENDOSCOPY;  Service:    • HYSTERECTOMY     • MASTECTOMY Bilateral 2010       Family History:   Family History   Problem Relation Age of Onset   • Cancer Mother         breast   • Diabetes Mother    • Heart  disease Father    • Stroke Father    • Hypertension Father    • Breast cancer Maternal Grandmother    • Breast cancer Paternal Grandmother        Social History:   Social History     Socioeconomic History   • Marital status: Single     Spouse name: Not on file   • Number of children: Not on file   • Years of education: Not on file   • Highest education level: Not on file   Tobacco Use   • Smoking status: Former Smoker     Quit date: 2003     Years since quittin.6   • Smokeless tobacco: Never Used   Substance and Sexual Activity   • Alcohol use: No     Frequency: Never   • Drug use: No   • Sexual activity: Not Currently       Medications:     Current Outpatient Medications:   •  acetaminophen (TYLENOL) 325 MG tablet, Take 650 mg by mouth Every 6 (Six) Hours As Needed for Mild Pain (1-3)., Disp: , Rfl:   •  AMINO ACIDS-PROTEIN HYDROLYS PO, Take  by mouth., Disp: , Rfl:   •  amLODIPine (NORVASC) 10 MG tablet, Take 1 tablet by mouth Daily., Disp:  , Rfl:   •  apixaban (ELIQUIS) 2.5 MG tablet tablet, Take 1 tablet by mouth Every 12 (Twelve) Hours., Disp: , Rfl:   •  atorvastatin (LIPITOR) 40 MG tablet, Take 40 mg by mouth Every Night., Disp: , Rfl:   •  clopidogrel (PLAVIX) 75 MG tablet, Take 1 tablet by mouth Daily., Disp: 30 tablet, Rfl: 0  •  donepezil (ARICEPT) 10 MG tablet, TAKE 1 TABLET BY MOUTH  EVERY NIGHT, Disp: 90 tablet, Rfl: 1  •  ferrous sulfate 325 (65 FE) MG tablet, Take 325 mg by mouth Daily With Breakfast., Disp: , Rfl:   •  gabapentin (NEURONTIN) 300 MG capsule, Take 1 capsule by mouth Every 12 (Twelve) Hours., Disp: 60 capsule, Rfl: 0  •  isosorbide mononitrate (IMDUR) 30 MG 24 hr tablet, Take 1 tablet by mouth Daily., Disp: 30 tablet, Rfl: 0  •  memantine (NAMENDA) 10 MG tablet, Take 10 mg by mouth 2 (Two) Times a Day., Disp: , Rfl:   •  QUEtiapine (SEROquel) 25 MG tablet, Take 1 tablet by mouth Every Night., Disp:  , Rfl:   •  doxycycline (DORYX) 100 MG enteric coated tablet, Take 1 tablet  "by mouth 2 (Two) Times a Day., Disp:  , Rfl:     Allergies:   Allergies   Allergen Reactions   • Cephalosporins Anaphylaxis   • Quinolones Anaphylaxis   • Ciprofloxacin    • Keflex [Cephalexin] Hives       Physical Exam:  Vital Signs:   Vitals:    02/24/21 1412   BP: 118/78   BP Location: Left arm   Pulse: 94   Resp: 18   SpO2: 98%   Weight: 61.7 kg (136 lb)   Height: 167.6 cm (66\")       Physical Exam  Constitutional:       Appearance: She is well-developed. She is not diaphoretic.      Comments: Elderly female appears frail, no acute distress.   HENT:      Head: Normocephalic and atraumatic.   Eyes:      General: No scleral icterus.     Pupils: Pupils are equal, round, and reactive to light.   Neck:      Musculoskeletal: Neck supple. No muscular tenderness.      Trachea: No tracheal deviation.   Cardiovascular:      Rate and Rhythm: Normal rate. Rhythm irregular.      Heart sounds: Normal heart sounds. No murmur. No friction rub. No gallop.       Comments: 1+ bilateral lower extremity swelling.  Diminished 1+ bilateral lower extremity DP and PT pulses.  Pulmonary:      Effort: Pulmonary effort is normal. No respiratory distress.      Breath sounds: Normal breath sounds. No stridor. No wheezing or rales.   Chest:      Chest wall: No tenderness.   Abdominal:      General: Bowel sounds are normal. There is no distension.      Palpations: Abdomen is soft.      Tenderness: There is no abdominal tenderness. There is no guarding or rebound.   Musculoskeletal:      Comments: Currently in a wheelchair.   Lymphadenopathy:      Cervical: No cervical adenopathy.   Skin:     Comments: No significant lower extremity foot or heel ulcers.  Several focal areas of erythema in the bilateral toes without evidence of ulceration or infection.  Focal, nonblanchable area on the posterior left heel without surrounding erythema or significant tenderness   Neurological:      Mental Status: She is alert. Mental status is at baseline. She is " disoriented.   Psychiatric:      Comments: Baseline Alzheimer's dementia         Results Review:   I reviewed the patient's new clinical results.  I personally viewed and interpreted the patient's EKG/Telemetry data      ECG 12 Lead    Date/Time: 2/24/2021 3:19 PM  Performed by: Luis Doll MD  Authorized by: Luis Doll MD   Comparison: not compared with previous ECG   Rhythm: atrial fibrillation  Rate: normal  Conduction: left bundle branch block    Clinical impression: abnormal EKG            Assessment / Plan:     1. PAD (peripheral artery disease)   --Arterial duplex reviewed from 2/21, suggestive of mild to moderate disease in the left CFA and proximal SFA, waveforms suggestive of severe stenosis in the distal left SFA  --Diminished left lower extremity DP and PT pulses, also consistent with severe underlying PAD  --Currently denies lower extremity discomfort or claudication, though ambulation appears to be significantly limited  --Focal nonblanchable area on the posterior left heel consistent with stage I pressure ulcer without evidence of infection  --Discussed the option of invasive peripheral angiography with potential intervention to the distal left SFA to promote wound healing, however the patient declines invasive testing or interventions and prefers medical management alone  --Given left heel ulcer appears to be stage I without infection and patient preferences for no invasive management, recommend continuing medical management  --Continue Plavix and high intensity statin  --Recommend standard pressure ulcer prevention precautions  --Will arrange follow-up in 1 month to reevaluate, if progression ulceration will again discuss invasive management    2. Paroxysmal atrial fibrillation   --Currently in rate controlled atrial fibrillation  --Chronically anticoagulated with Eliquis for CVA prophylaxis    3. Chronic diastolic heart failure   --History of diastolic dysfunction noted on  echocardiogram and history of elevated proBNP  --Bilateral lower extremity edema on exam today, however denies shortness of breath or orthopnea  --Suspect a component of chronic bilateral lower extremity edema  --BP currently adequately controlled    4. Stage 3 chronic kidney disease  --Renal function appears stable on most recent labs    5.  Alzheimer's dementia  --On Aricept        Follow Up:   Return in about 4 weeks (around 3/24/2021).      Thank you for allowing me to participate in the care of your patient. Please to not hesitate to contact me with additional questions or concerns.     YING Doll MD  Interventional Cardiology   02/24/2021  14:07 EST

## 2021-02-27 NOTE — PROGRESS NOTES
Nursing Home History and Physical        Tanowilder Cevallos DO []  CUONG Bauer []  [address] [place] [address]  Phone: [phone]  Fax: [phone] Freida Alanis MD[x]  Woodrow Quiroz DO []   KEAGAN Bartholomew []    [address] [place] [address]  Phone: [phone]  Fax: [phone]     PATIENT NAME: Tammy Pascual                                                                          YOB: 1945           DATE OF SERVICE: 11/18/2020  FACILITY:   [x] [place] [] [place]  [] [facility]    [] [facility]      ______________________________________________________________________    CHIEF COMPLAINT:  Left lower lobe pneumonia      HISTORY OF PRESENT ILLNESS:   Mrs. Pascual is a 74 years old female, previously resident of [facility] living [facility], with history of Alzheimer's dementia, chronic kidney disease, diastolic heart failure, hypothyroidism, atrial fibrillation and coronary artery disease who was hospitalized at [facility] November 12-17 with a diagnosis of left lower lobe pneumonia and acute renal failure secondary acute metabolic encephalopathy.  Subsequently, she was noted to have uncontrolled hypertension, she was managed with IV antibiotics, hydration, amlodipine and ACE inhibitor which was initially held and then resumed along with supportive care after which her condition stabilized.  Patient was transferred to this facility for PT, OT and skilled nursing care.    During my visit, patient was lying comfortably in her bed; of note, I am familiar with Mrs. Pascual as she used to be my patient in the past.  As I called her name, I asked her if she remembered me my: She smiled and stated [Dr. Alanis].  She complained of weakness, otherwise denied acute symptoms.    PAST MEDICAL & SURGICAL HISTORY:   Past Medical History:   Diagnosis Date   • A-fib (CMS/[facility])    • Alzheimer's dementia (CMS/[facility]) 04/07/2019   • Anemia    •  Arthritis    • Breast cancer (CMS/[facility])     naveed mastectomy   • Cancer (CMS/[facility])     breast   • Congestive heart failure (CMS/[facility])    • Diabetes mellitus (CMS/[facility])     DIET CONTROLLED   • Disease of thyroid gland    • Hearing loss    • Heart disease    • Hyperlipidemia    • Hypertension    • Kidney disease    • Sleep apnea       Past Surgical History:   Procedure Laterality Date   • BACK SURGERY     • BREAST BIOPSY     • BREAST EXCISIONAL BIOPSY Left 1999   • BREAST RECONSTRUCTION Bilateral    • CAROTID ARTERY ANGIOPLASTY Right     ([facility])   • CATARACT EXTRACTION Bilateral    • COLONOSCOPY N/A 2017    Procedure: COLONOSCOPY;  Surgeon: Sindy Clement MD;  Location: [facility] ENDOSCOPY;  Service:    • HYSTERECTOMY     • MASTECTOMY Bilateral          MEDICATIONS:  I have reviewed and reconciled the patients medication list in the patients chart at the skilled nursing facility today.      ALLERGIES:  Allergies   Allergen Reactions   • Cephalosporins Anaphylaxis   • Quinolones Anaphylaxis   • Ciprofloxacin    • Keflex [Cephalexin] Hives         SOCIAL HISTORY:  Social History     Socioeconomic History   • Marital status: Single     Spouse name: Not on file   • Number of children: Not on file   • Years of education: Not on file   • Highest education level: Not on file   Tobacco Use   • Smoking status: Former Smoker     Quit date: 2003     Years since quittin.7   • Smokeless tobacco: Never Used   Substance and Sexual Activity   • Alcohol use: No     Frequency: Never   • Drug use: No   • Sexual activity: Not Currently       FAMILY HISTORY:  Family History   Problem Relation Age of Onset   • Cancer Mother         breast   • Diabetes Mother    • Heart disease Father    • Stroke Father    • Hypertension Father    • Breast cancer Maternal Grandmother    • Breast cancer Paternal Grandmother        REVIEW OF SYSTEMS:  Review of Systems   Constitutional: Positive for fatigue.    HENT: Negative.    Respiratory: Negative.    Cardiovascular: Negative.    Gastrointestinal: Negative.    Genitourinary: Negative.    Musculoskeletal: Positive for arthralgias, gait problem and myalgias.   Skin: Negative.    Neurological: Positive for confusion.   Psychiatric/Behavioral: The patient is nervous/anxious.        PHYSICAL EXAMINATION:   /48, HR 60/min, RR 18/min, temp 98.4 °F    Physical Exam  Constitutional:       Appearance: Normal appearance.   HENT:      Head: Normocephalic and atraumatic.   Eyes:      Extraocular Movements: Extraocular movements intact.      Pupils: Pupils are equal, round, and reactive to light.   Neck:      Musculoskeletal: Normal range of motion and neck supple.   Cardiovascular:      Rate and Rhythm: Normal rate and regular rhythm.      Pulses: Normal pulses.      Heart sounds: Normal heart sounds.   Pulmonary:      Effort: Pulmonary effort is normal.      Breath sounds: Normal breath sounds.   Abdominal:      General: Abdomen is flat.      Palpations: Abdomen is soft.   Musculoskeletal: Normal range of motion.      Comments: Deconditioning noted   Skin:     General: Skin is warm.   Neurological:      Mental Status: She is alert.      Comments: Pleasantly confused   Psychiatric:         Mood and Affect: Mood normal.       RECORDS REVIEW:   I have reviewed in detail available records including discharge summary and workup    ASSESSMENT:  · Status post left lower lobe pneumonia, improved  · Chronic essential hypertension, borderline control  · Coronary artery disease, stable  · Chronic atrial fibrillation, clinically rate controlled at present  · Chronic kidney disease  · Alzheimer's dementia    PLAN:  · Preadmission medications reviewed, reconciled and reviewed  · PT and OT as clinically indicated  · Fall and aspiration precautions   · Routine baseline labs  · Nutritional support recommended and monitored with interdisciplinary support  · Patient is being monitored  closely, further plans were modified accordingly    [x]  Discussed Patient in detail with nursing/staff, addressed all needs today.     [x]  Plan of Care Reviewed   [x]  PT/OT Reviewed     []  Wound assessment and management documentation reviewed    []  Antipsychotic medications and benzodiazepine addressed  []  Order Changes  []  Opioid medications addressed  []  Order Changes  []  Discharge Plans Reviewed   []  Advance Directive on file with Nursing Home.   []  POA on file with Nursing Home.   [x]  Code Status listed on patients chart at the nursing home facility.          Freida Alanis MD.  11/18/20

## 2021-03-02 NOTE — TELEPHONE ENCOUNTER
[facility] DIS[facility] REQUEST FOR GABAPENTIN 300 MG.    DIRECTIONS: TAKE 1 TAB PO BID.    MEDICATION NEEDS TO BE SENT TO [facility] IN [place].

## 2021-03-13 NOTE — PROGRESS NOTES
Nursing Home Discharge Summary      Tano Cevallos DO  []  CUONG Bauer  []  [address] [facility], [place] [address]  Phone: [phone]  Fax: [phone] Freida Alanis MD  []  Woodrow Quiroz DO  []  Alexandra Pate PA-C  [x]  [address] [place] [address]  Phone: [phone]  Fax: [phone]     PATIENT NAME: Tammy Pasucal                                                                          YOB: 1945     Age:  75 y.o.  Sex:  female  DATE OF SERVICE: 03/01/2021  Primary Care Physician:  Sravani Grijalva MD   Date of Discharge:  03/3/2021   Admission Date:  11/17/2020  FACILITY:   [] [facility]    [] [place]    [] [facility]     [] [facility]    [] Other      Discharge Diagnosis:    Encounter Diagnoses   Name Primary?   • Generalized weakness Chronic, continue supportive care.     • Dementia of the Alzheimer's type with late onset without behavioral disturbance (CMS/[facility]) Continued poor cognition, stable.  Continue supportive care.     • Impaired mobility Will benefit from continued PT/OT services as SNF.      • PAD (peripheral artery disease) (CMS/[facility]) Followed by Dr. Doll, patient deferred aggressive interventions.      • Paroxysmal atrial fibrillation (CMS/HCC) Clinically stable, continue current medications. Amiodirone discontinued due to bradycardia.  On AC.     • Deep tissue injury Stable.  Continue to monitor.  Continue with offloading/pressure reduction.     • Stage 3 chronic kidney disease, unspecified whether stage 3a or 3b CKD (CMS/HCC) Clinically stable.  2/18:  BUN 19, Cr. 1.7.  Continue with routine monitoring of renal function.      • Essential hypertension Controlled.  Continue current medications.      • Diastolic congestive heart failure, unspecified HF chronicity (CMS/HCC) Stable, no acute signs of decompensation.  Continue to monitor.      • COVID-19 virus infection Required hospitalization post viral infection due to pneumonia.  No continued respiratory  symptoms.  Received 2 step vaccination at SNF.          Presenting Problem: Generalized weakness    History of Presenting Illness:  Mrs. Pascual is a 74 year old female, previously resident of [facility] living [facility], with history of Alzheimer's dementia, chronic kidney disease, diastolic heart failure, hypothyroidism, atrial fibrillation and coronary artery disease who was hospitalized at [facility] November 12-17 with a diagnosis of left lower lobe pneumonia and acute renal failure secondary acute metabolic encephalopathy.  Subsequently, she was noted to have uncontrolled hypertension, she was managed with IV antibiotics, hydration, amlodipine and ACE inhibitor which was initially held and then resumed along with supportive care after which her condition stabilized.  Patient was transferred to this facility for PT, OT and skilled nursing care.  Patient diagnosed with COVID-19 on 12/17, where she completed isolation/droplet precautions as per facility protocol.  Prophylacticly treated with ABx and steroids.  She was noted to have minimal respiratory symptoms with no fever or hypoxia.  She did develop symptomatic bradycardia, subsequently amiodarone discontinued.  She was given IV fluids with minimal improvement.  EKG obtained at facility suspected junctional rhythm and she was subsequently transferred to [facility] for further evaluation.  Cardiology agreed with discontinuing amiodarone, utilizing Norvasc for hypertension control and monitor closely.  She was subsequently discharged back to this facility for continued care.  Unfortunately patient developed acute symptoms mid January with episode of unresponsiveness where she was transferred back to [facility] for urgent assessment.  She was admitted for bilateral pneumonia secondary to COVID-19.  Her condition improved with antibiotics and steroids and she was transferred back to the nursing facility for  continued care.  Upon her readmission, patient had new baseline mentation, felt dementia was worsening.  She had continued generalized weakness, sleeping more throughout the day, overall appetite reduced.  Nursing staff noted development of left heel DTI.  Left lower extremity arterial Doppler noted diffuse PAD and she was evaluated by cardiology as an outpatient.  Ultimately elected for conservative medical management, declined invasive peripheral angiography.  Nursing staff have continued with offloading and pressure reduction.  Unfortunately, she has developed additional wounds to the right buttock and right heel.  Concerns for UTI were noted, due to patient's continued confusion, completed ABX end of February.  She has continued to have confusion, at times paranoia, which have been baseline for patient since last hospital admission/post COVID.  She presents today for transfer to [facility].  Due to COVID facility outbreak, patient was unable to transfer to [facility] at discharge from her initial hospitalization.  As outbreak subsided, patient is now able to transfer to this facility as originally scheduled.  She will need continued follow up with Dr. Doll in regards to her PAD.    Vitals:  /70   Pulse 71   Temp 97.5 °F (36.4 °C)   Resp 20   Wt 61.2 kg (135 lb)   SpO2 96%   BMI 21.79 kg/m²     Review of Systems   Unable to perform ROS: Dementia (limited)   Constitutional: Positive for fatigue.   Respiratory: Negative for shortness of breath.    Cardiovascular: Negative for chest pain.   Skin: Positive for color change.   Neurological: Positive for weakness (generalized).   Psychiatric/Behavioral: Negative for agitation.         Physical Exam  Vitals and nursing note reviewed.   Constitutional:       Appearance: She is ill-appearing (chronic).      Comments: Frail elderly female, lying in bed.  NAD   HENT:      Head: Normocephalic and atraumatic.      Right Ear: External ear normal.       Left Ear: External ear normal.   Eyes:      Conjunctiva/sclera: Conjunctivae normal.      Pupils: Pupils are equal, round, and reactive to light.   Cardiovascular:      Rate and Rhythm: Normal rate and regular rhythm.      Pulses: Normal pulses.   Pulmonary:      Effort: Pulmonary effort is normal. No respiratory distress.      Breath sounds: Normal breath sounds.   Abdominal:      General: Bowel sounds are normal.      Palpations: Abdomen is soft.   Musculoskeletal:         General: No swelling or tenderness.      Cervical back: Normal range of motion and neck supple.      Comments: Bilateral heel noted to have DTIs, extremities neurovascularly intact.    Skin:     General: Skin is warm and dry.      Capillary Refill: Capillary refill takes less than 2 seconds.   Neurological:      Mental Status: She is alert. Mental status is at baseline. She is disoriented.   Psychiatric:         Behavior: Behavior normal.        Condition on Discharge:    Stable to home    Discharge Medication:    Current Outpatient Medications:   •  AMINO ACIDS-PROTEIN HYDROLYS PO, Take  by mouth., Disp: , Rfl:   •  amLODIPine (NORVASC) 10 MG tablet, Take 1 tablet by mouth Daily., Disp:  , Rfl:   •  apixaban (ELIQUIS) 2.5 MG tablet tablet, Take 1 tablet by mouth Every 12 (Twelve) Hours., Disp: , Rfl:   •  atorvastatin (LIPITOR) 40 MG tablet, Take 40 mg by mouth Every Night., Disp: , Rfl:   •  clopidogrel (PLAVIX) 75 MG tablet, Take 1 tablet by mouth Daily., Disp: 30 tablet, Rfl: 0  •  donepezil (ARICEPT) 10 MG tablet, TAKE 1 TABLET BY MOUTH  EVERY NIGHT, Disp: 90 tablet, Rfl: 1  •  ferrous sulfate 325 (65 FE) MG tablet, Take 325 mg by mouth Daily With Breakfast., Disp: , Rfl:   •  isosorbide mononitrate (IMDUR) 30 MG 24 hr tablet, Take 1 tablet by mouth Daily., Disp: 30 tablet, Rfl: 0  •  memantine (NAMENDA) 10 MG tablet, Take 10 mg by mouth 2 (Two) Times a Day., Disp: , Rfl:   •  Menthol-Zinc Oxide (Calmoseptine) 0.44-20.6 % ointment, Apply   topically to the appropriate area as directed. APPLY EVERY SHIFT BETWEEN 7:00 AM- 6:59 PM AND 7:00 PM TO 6:59 AM.  CLEAN BILATERAL BUTTOCK WITH WARM SOAPY WATER RINSE PAT DRY AND APPLY CALMOSEPTINE EVERY SHIFT AND AS NEEDED UNTIL RESOLVED., Disp: , Rfl:   •  QUEtiapine (SEROquel) 25 MG tablet, Take 1 tablet by mouth Every Night., Disp:  , Rfl:   •  gabapentin (NEURONTIN) 300 MG capsule, Take 1 capsule by mouth Every 12 (Twelve) Hours., Disp: 60 capsule, Rfl: 0     Time: Discharge 33 min    Alexandra Pate PA-C   03/01/2021

## 2021-04-26 PROBLEM — J69.0 ASPIRATION PNEUMONIA OF BOTH LOWER LOBES DUE TO VOMIT (HCC): Status: ACTIVE | Noted: 2021-01-01

## 2021-04-26 PROBLEM — J18.9 PNEUMONIA OF BOTH LUNGS DUE TO INFECTIOUS ORGANISM: Status: ACTIVE | Noted: 2021-01-01

## 2021-04-26 PROBLEM — I50.33 ACUTE ON CHRONIC DIASTOLIC CHF (CONGESTIVE HEART FAILURE) (HCC): Status: ACTIVE | Noted: 2021-01-01

## 2021-05-01 NOTE — PROGRESS NOTES
[facility]    Name:  Tammy Pascual   Age:  75 y.o.  Sex:  female  :  1945  MRN:  [id number]   Visit Number:  [id number]  Admission Date:  2021  Date Of Service:  21  Primary Care Physician:  Luis Fernando Payan MD     LOS: 5 days :  Patient Care Team:  Luis Fernando Payan MD as PCP - General (Internal Medicine)  Freida Alanis MD as PCP - Internal Medicine ([facility])  Luis Fernando Flores MD as Consulting Physician (Cardiology)  Sindy Clement MD as Surgeon (General Surgery):    History taken from:     patient chart    Chief Complaint:      Dyspnea    Subjective:    Interval History:     Patient seen and examined again today.    Patient is 75-year-old female with Alzheimer's dementia, hypertension, aspiration, A. fib, chronic diastolic congestive heart failure, chronic kidney disease stage III who comes in lethargic and sleepy, not eating or drinking much.  Was found to have low oxygen was sent to the emergency room for evaluation.  Chest x-ray showed bilateral airspace disease.  Patient has been placed on antibiotics Azactam and Zithromax as well as Lasix.  Patient has deteriorated overnight.  She is now requiring 6 L of O2.  Chest x-ray showed worsening pneumonia.  She has been afebrile.  Procalcitonin is minimally elevated.    Modified barium swallow was done , recommendations are puréed diet with honey thickened liquids, crushed meds, proper head positioning, and aspiration precautions.  Suspicion is the patient is aspirating at this point.    PT and OT have been consulted to work with the patient, they have held off today due to worsening oxygen requirements.      She continues on Azactam.  Respiratory panel, Covid-19, blood cultures, Legionella, strep, MRSA probe have all been negative.      Overall the patient has a poor prognosis given her multiple medical issues, and specifically her advancing dementia.  She  continues have difficulty with oral intake with aspiration and decreased fluid intake.  Sodium has again increased.  Continue with IV fluids for now.  Hold off on any Lasix.      Review of Systems:     All systems were reviewed and negative except for:  Respiratory: positive for  shortness of air    Objective:    Vital Signs:    Temp:  [98 °F (36.7 °C)-99.5 °F (37.5 °C)] 98 °F (36.7 °C)  Heart Rate:  [82-94] 93  Resp:  [16-20] 16  BP: (120-166)/(37-72) 127/59    Physical Exam:      General: Alert and oriented x 1, moderate distress  Heart: Regular rate and rhythm without murmur rub or thrill  Lungs: Decreased breath sounds bilaterally without use of accessory muscles or respiration  Abdomen: Soft/nontender/nondistended.  No HSM noted.  MSK: 4/5 strength in upper/lower extremities bilaterally.  No edema noted.     Results Review:      I reviewed the patient's new clinical results.  I reviewed the patient's new imaging results and agree with the interpretation.  I reviewed the patient's other test results and agree with the interpretation  I personally viewed and interpreted the patient's EKG/Telemetry data    Labs:    Results from last 7 days   Lab Units 05/01/21  0556 04/30/21  0729 04/29/21  1653 04/29/21  0419 04/26/21  1909   SODIUM mmol/L 155* 149*  --  147* 142   POTASSIUM mmol/L 4.3 3.6 3.8 3.3* 3.5   CHLORIDE mmol/L 120* 113*  --  108* 105   CO2 mmol/L 23.2 24.3  --  26.1 24.5   BUN mg/dL 43* 35*  --  37* 45*   CREATININE mg/dL 1.41* 1.32*  --  1.28* 1.40*   CALCIUM mg/dL 9.7 9.2  --  9.5 9.6   BILIRUBIN mg/dL  --   --   --   --  0.9   ALK PHOS U/L  --   --   --   --  88   ALT (SGPT) U/L  --   --   --   --  17   AST (SGOT) U/L  --   --   --   --  25   GLUCOSE mg/dL 132* 143*  --  123* 158*     Results from last 7 days   Lab Units 04/30/21  0729 04/29/21  0514 04/29/21  0419 04/28/21  0657   WBC 10*3/mm3 11.21*  --  10.41 9.28   HEMOGLOBIN g/dL 7.6* 8.0* 6.1* 8.6*   HEMATOCRIT % 23.4* 24.3* 18.9* 27.4*    PLATELETS 10*3/mm3 348  --  360 286         Results from last 7 days   Lab Units 04/26/21  1909   TROPONIN T ng/mL 0.039*     Results from last 7 days   Lab Units 04/26/21  1907   BLOODCX  No growth at 4 days  No growth at 4 days            Radiology:    Imaging Results (Last 24 Hours)     ** No results found for the last 24 hours. **          Medication Review:     amLODIPine, 5 mg, Oral, Q24H  apixaban, 2.5 mg, Oral, Q12H  atorvastatin, 40 mg, Oral, Nightly  aztreonam, 2 g, Intravenous, Q8H  clopidogrel, 75 mg, Oral, Daily  donepezil, 10 mg, Oral, Nightly  DULoxetine, 60 mg, Oral, Daily  ferrous sulfate, 300 mg, Oral, Daily With Breakfast  gabapentin, 300 mg, Oral, Q12H  guaiFENesin, 200 mg, Oral, Q4H  ipratropium-albuterol, 3 mL, Nebulization, Q6H - RT  isosorbide dinitrate, 10 mg, Oral, TID - Nitrates  memantine, 10 mg, Oral, Q12H  metoprolol succinate XL, 25 mg, Oral, Q24H  multivitamin with iron-minerals, 15 mL, Oral, Daily  PRO-STAT, 30 mL, Oral, BID  sodium chloride, 10 mL, Intravenous, Q12H        dextrose, 50 mL/hr, Last Rate: 50 mL/hr (05/01/21 0927)        Assessment:    Active Problems:    Paroxysmal atrial fibrillation (CMS/HCC)      Overview:     Obstructive sleep apnea    Hypertensive urgency    Hypothyroidism (acquired)    Dementia of the Alzheimer's type with late onset without behavioral disturbance (CMS/HCC)    Iron deficiency anemia, unspecified    CKD (chronic kidney disease) stage 3, GFR 30-59 ml/min    Pneumonia of both lungs due to infectious organism    Acute on chronic diastolic CHF (congestive heart failure) (CMS/HCC)    Aspiration pneumonia of both lower lobes due to vomit (CMS/HCC)      1.  Acute hypoxic respiratory failure, worsening, suspect due to aspiration.  2.  Acute on chronic diastolic congestive heart failure exacerbation  3.  Multifocal aspiration pneumonitis/pneumonia  4.  Chronic kidney disease stage III appears stable  5.  Anemia, suspect chronic disease  6.  Atrial  fibrillation on anticoagulation  7.  Alzheimer's dementia with behavioral disturbance  8.  Hypertension  9.  Parkinson's disease  10.  Impaired mobility and ADLs  11.  Chronic wounds-unstageable pressure injuries to bilateral heels, dry eschar.  Present on admission  12.  Electrolyte abnormalities.  13.  Hyponatremia, suspect due to dehydration    Plan:    Continue with wound care.  Change IV fluid to D5W.   Continue with Azactam.  Continue DuoNeb, Mucinex.  Palliative care has been consulted as the patient does have poor prognosis.  Continue Eliquis for DVT and stroke prophylaxis.  Patient is DNI.  Try to wean down oxygen.  Further recommendations will depend on clinical course.    Jabari Mixon DO  05/01/21  11:59 EDT  Addendum: Patient now in A. fib with RVR, more hypoxic.  Will change antibiotic over to Merrem.  We will also place on full dose Lovenox, as she refused Eliquis this a.m. and she would be high risk for PE.  We will continue to monitor closely.  Patient is DO NOT INTUBATE status per the power of .    Electronically signed by Jabair Mixon DO, 05/01/21, 5:33 PM EDT.

## 2021-05-01 NOTE — PLAN OF CARE
Goal Outcome Evaluation:        Outcome Summary: VSS.  SPO2 dropped to low 80's and upper 70's on 6L O2 via nasal cannula.  Humidified high flow nasal cannula placed on 8L resulting in SPO2 in 90's.  Ordered PRN medication given for discomfort (see MAR).  No acute events noted during shift.  Will continue to monitor patient.

## 2021-05-01 NOTE — PLAN OF CARE
Goal Outcome Evaluation:  Pt confused and disoriented x 4. Pt was on 8L high flow nasal cannula at the beginning of shift. Pt will not take her medication. Pt is also not eating or drinking. D5W started at 50mL/hr. Heart rated noted to be 120's-150's at around 1345. EKG confirmed afib with RVR. Pts O2 at 85% on 8L high flow nasal cannula. Oxygen increased to 15L O2 high flow nasal cannula. Pt now has an O2 sat of 91%. Dr Mixon notified. Metoprolol 5mg IV ordered and given. Pts heart rate still in the 120's-150's. Pt transferred to 3rd floor.

## 2021-05-01 NOTE — NURSING NOTE
Milton Pascual, the patients POA, took home 2 rings with him that the patient was wearing. Mr Pascual was afraid the patient would lose the rings because of her confusion.

## 2021-05-01 NOTE — THERAPY TREATMENT NOTE
"Patient would not open her eyes and refused to get up giving multiple excuses \"my toe hurts, they are not getting my money\" ; will check back 5/2.    "

## 2021-05-02 NOTE — PROGRESS NOTES
[facility]    Name:  Tammy Pascual   Age:  75 y.o.  Sex:  female  :  1945  MRN:  [id number]   Visit Number:  [id number]  Admission Date:  2021  Date Of Service:  21  Primary Care Physician:  Luis Fernando Payan MD    Patient was seen and examined. Pertinent laboratory and radiology data were reviewed.    Vital signs:    Vital Signs (last 24 hours)        0700  -   0659  07  -   0251   Most Recent    Temp (°F) 97.7 -  99.5    98 -  99.8     99.2 (37.3)    Heart Rate 80 -  94    85 -  144     122    Resp 16 -  20    16 -  20     20    /70 -  162/54    118/65 -  166/72     123/63    SpO2 (%) 90 -  99    73 -  100     99          Patient's overall status was declining rapidly.  She was requiring more oxygen and is currently on 15 L nasal cannula.  Diltiazem drip was also at maximum levels.  POA was contacted and given the severity of disease, he understood that the patient's chance of survival was poor.  He preferred that the patient be comfortable rather than undergo CPR and aggressive measures.  CODE STATUS was changed to DNR.    Woodrow Quiroz DO  21  02:51 EDT

## 2021-05-02 NOTE — PLAN OF CARE
Goal Outcome Evaluation:  Plan of Care Reviewed With: patient  Progress: no change  Outcome Summary: No acute events this shift.  Patient on BiPap for most of the shift due to O2 sats dropping to mid 80's on 15L HFNC. Cardizem drip continued at 15mg/hr with HR in 100-110s.  BP remains stable at this time.

## 2021-05-02 NOTE — PROGRESS NOTES
[facility]    Name:  Tammy Pascual   Age:  75 y.o.  Sex:  female  :  1945  MRN:  [id number]   Visit Number:  [id number]  Admission Date:  2021  Date Of Service:  21  Primary Care Physician:  Luis Fernando Payan MD     LOS: 6 days :  Patient Care Team:  Luis Fernando Payan MD as PCP - General (Internal Medicine)  Freida Alanis MD as PCP - Internal Medicine ([facility])  Luis Fernando Flores MD as Consulting Physician (Cardiology)  Sindy Clement MD as Surgeon (General Surgery):    History taken from:     patient chart    Chief Complaint:      Dyspnea    Subjective:    Interval History:     Patient seen and examined again today.    Patient is 75-year-old female with Alzheimer's dementia, hypertension, aspiration, A. fib, chronic diastolic congestive heart failure, chronic kidney disease stage III who comes in lethargic and sleepy, not eating or drinking much.  Was found to have low oxygen was sent to the emergency room for evaluation.  Chest x-ray showed bilateral airspace disease.  Patient has been placed on antibiotics Azactam and Zithromax as well as Lasix.  Patient has deteriorated overnight.  She is now on BiPAP at 15 L of O2.  Chest x-ray reviewed from last night which shows diffuse reticulonodular interstitial opacities which could be atypical viral pneumonia or chronic interstitial lung disease.  She has been afebrile.  Procalcitonin is minimally elevated.    Modified barium swallow was done , recommendations are puréed diet with honey thickened liquids, crushed meds, proper head positioning, and aspiration precautions.  Suspicion is the patient is aspirating at this point.    PT and OT have been consulted to work with the patient, they have signed off today due to worsening oxygen requirements.    Patient is also in A. fib with RVR.  She is on a Cardizem drip, added Lopressor to help with heart rate.  Echo has  been ordered which is pending for tomorrow.      She was switched to Merrem to ensure better coverage.  Respiratory panel, Covid-19, blood cultures, Legionella, strep, MRSA probe have all been negative.      Overall the patient has a poor prognosis given her multiple medical issues, and specifically her advancing dementia.  She continues have difficulty with oral intake with aspiration and decreased fluid intake.  Sodium has again increased.  Continue with IV fluids for now and will increase D5W.  Hold off on any Lasix.      Review of Systems:     All systems were reviewed and negative except for:  Respiratory: positive for  shortness of air    Objective:    Vital Signs:    Temp:  [99.2 °F (37.3 °C)-99.9 °F (37.7 °C)] 99.7 °F (37.6 °C)  Heart Rate:  [] 107  Resp:  [16-26] 24  BP: ()/(54-95) 135/62    Physical Exam:      General: Alert and oriented x 1, moderate distress  Heart: Regular rate and rhythm without murmur rub or thrill  Lungs: Decreased breath sounds bilaterally without use of accessory muscles or respiration  Abdomen: Soft/nontender/nondistended.  No HSM noted.  MSK: 4/5 strength in upper/lower extremities bilaterally.  No edema noted.     Results Review:      I reviewed the patient's new clinical results.  I reviewed the patient's new imaging results and agree with the interpretation.  I reviewed the patient's other test results and agree with the interpretation  I personally viewed and interpreted the patient's EKG/Telemetry data    Labs:    Results from last 7 days   Lab Units 05/02/21  0607 05/01/21  0556 04/30/21  0729 04/26/21  1909   SODIUM mmol/L 159* 155* 149* 142   POTASSIUM mmol/L 3.8 4.3 3.6 3.5   CHLORIDE mmol/L 124* 120* 113* 105   CO2 mmol/L 23.4 23.2 24.3 24.5   BUN mg/dL 45* 43* 35* 45*   CREATININE mg/dL 1.52* 1.41* 1.32* 1.40*   CALCIUM mg/dL 9.7 9.7 9.2 9.6   BILIRUBIN mg/dL  --   --   --  0.9   ALK PHOS U/L  --   --   --  88   ALT (SGPT) U/L  --   --   --  17   AST (SGOT)  U/L  --   --   --  25   GLUCOSE mg/dL 103* 132* 143* 158*     Results from last 7 days   Lab Units 04/30/21  0729 04/29/21  0514 04/29/21  0419 04/28/21  0657   WBC 10*3/mm3 11.21*  --  10.41 9.28   HEMOGLOBIN g/dL 7.6* 8.0* 6.1* 8.6*   HEMATOCRIT % 23.4* 24.3* 18.9* 27.4*   PLATELETS 10*3/mm3 348  --  360 286         Results from last 7 days   Lab Units 04/26/21  1909   TROPONIN T ng/mL 0.039*     Results from last 7 days   Lab Units 04/26/21  1907   BLOODCX  No growth at 5 days  No growth at 5 days            Radiology:    Imaging Results (Last 24 Hours)     Procedure Component Value Units Date/Time    XR Chest 1 View [[id number]] Collected: 05/01/21 2321     Updated: 05/01/21 2322    Narrative:      FINAL REPORT    CLINICAL HISTORY:  HYPOXIA    FINDINGS:  There are diffuse reticulonodular interstitial opacities.  There  is no pneumothorax or pleural effusion.  The left heart border  is obscured.      Impression:      Diffuse reticulonodular interstitial opacities, could represent  atypical viral pneumonia or chronic interstitial lung disease.    Authenticated by Shabbir Kowalski MD on 05/01/2021 11:21:36 PM          Medication Review:     atorvastatin, 40 mg, Oral, Nightly  clopidogrel, 75 mg, Oral, Daily  donepezil, 10 mg, Oral, Nightly  DULoxetine, 60 mg, Oral, Daily  enoxaparin, 60 mg, Subcutaneous, Q24H  ferrous sulfate, 300 mg, Oral, Daily With Breakfast  gabapentin, 300 mg, Oral, Q12H  guaiFENesin, 200 mg, Oral, Q4H  ipratropium-albuterol, 3 mL, Nebulization, Q6H - RT  isosorbide dinitrate, 10 mg, Oral, TID - Nitrates  memantine, 10 mg, Oral, Q12H  meropenem, 1 g, Intravenous, Q12H  metoprolol tartrate, 25 mg, Oral, Q12H  multivitamin with iron-minerals, 15 mL, Oral, Daily  PRO-STAT, 30 mL, Oral, BID  sodium chloride, 10 mL, Intravenous, Q12H        dextrose, 100 mL/hr, Last Rate: 100 mL/hr (05/02/21 1012)  dilTIAZem, 5-15 mg/hr, Last Rate: 15 mg/hr (05/02/21 0838)  Pharmacy to Dose enoxaparin (LOVENOX),    Pharmacy to Dose meropenem (MERREM),         Assessment:    Active Problems:    Paroxysmal atrial fibrillation (CMS/HCC)      Overview:     Obstructive sleep apnea    Hypertensive urgency    Hypothyroidism (acquired)    Dementia of the Alzheimer's type with late onset without behavioral disturbance (CMS/HCC)    Iron deficiency anemia, unspecified    CKD (chronic kidney disease) stage 3, GFR 30-59 ml/min    Pneumonia of both lungs due to infectious organism    Acute on chronic diastolic CHF (congestive heart failure) (CMS/HCC)    Aspiration pneumonia of both lower lobes due to vomit (CMS/HCC)      1.  Acute hypoxic respiratory failure, worsening, suspect due to aspiration.  2.  Acute on chronic diastolic congestive heart failure exacerbation  3.  Multifocal aspiration pneumonitis/pneumonia  4.  Chronic kidney disease stage III appears stable  5.  Anemia, suspect chronic disease  6.  Atrial fibrillation with RVR on Cardizem drip, beta-blocker and on anticoagulation  7.  Alzheimer's dementia with behavioral disturbance  8.  Hypertension  9.  Parkinson's disease  10.  Impaired mobility and ADLs  11.  Chronic wounds-unstageable pressure injuries to bilateral heels, dry eschar.  Present on admission  12.  Electrolyte abnormalities.  13.  Hyponatremia, suspect due to dehydration, worsening    Plan:    Continue with wound care.  Increase D5W.   Continue with Merrem.  Continue DuoNeb, Mucinex.  Palliative care has been consulted as the patient does have poor prognosis.  Continue full dose Lovenox for now.  Patient is DNI.  Try to wean down oxygen.  Patient currently on BiPAP.  Attempted to call the boyfriend/power of  several times, no answer.  Try to wean down oxygen.  Further recommendations will depend on clinical course.    Jabari Mixon DO  05/02/21  12:47 EDT

## 2021-05-02 NOTE — THERAPY DISCHARGE NOTE
Pt's chart reviewed including Dr. Quiroz's note which documents pt's rapid status decline with poor prognosis. Will d/c PT services at this time.

## 2021-05-03 NOTE — CONSULTS
Date of consultation:   May 3, 2021    Requested by:   Hospitalist Service.     PCP: Luis Fernando Payan MD    Reason:  Acute Respiratory Failure.     History of Present Illness:  75 y.o. female who presented to our facility with lethargy and fever.  The patient apparently has a past medical history significant for Alzheimer's dementia, aspiration, atrial fibrillation, CKD and CHF.    she was evaluated by the ER and was found to have hypoxemia on evaluation.  The patient was placed on BiPAP and admitted to the hospital.    Pulmonary consultation was requested for further recommendations.     No further history is available from the patient, as she is on the BiPAP.    Review of System: Could not be obtained, as the patient is on BiPAP.     Past Medical History:  Past Medical History:   Diagnosis Date   • A-fib (CMS/HCC)    • Alzheimer's dementia (CMS/HCC) 04/07/2019   • Anemia    • Arthritis    • Breast cancer (CMS/HCC)     naveed mastectomy   • Cancer (CMS/HCC)     breast   • Congestive heart failure (CMS/HCC)    • Diabetes mellitus (CMS/HCC)     DIET CONTROLLED   • Disease of thyroid gland    • Hearing loss    • Heart disease    • Hyperlipidemia    • Hypertension    • Impaired functional mobility, balance, gait, and endurance    • Kidney disease    • Parkinson's disease (CMS/HCC)    • Sleep apnea          Past Surgical History:  Past Surgical History:   Procedure Laterality Date   • BACK SURGERY  2015   • BREAST BIOPSY  1998   • BREAST EXCISIONAL BIOPSY Left 02/08/1999   • BREAST RECONSTRUCTION Bilateral    • CAROTID ARTERY ANGIOPLASTY Right 2008    ([facility])   • CATARACT EXTRACTION Bilateral    • COLONOSCOPY N/A 8/17/2017    Procedure: COLONOSCOPY;  Surgeon: Sindy Clement MD;  Location: [facility] ENDOSCOPY;  Service:    • HYSTERECTOMY     • MASTECTOMY Bilateral 2010         Family History:  Family History   Problem Relation Age of Onset   • Cancer Mother         breast   • Diabetes Mother    •  "Heart disease Father    • Stroke Father    • Hypertension Father    • Breast cancer Maternal Grandmother    • Breast cancer Paternal Grandmother          Social History:  Social History     Socioeconomic History   • Marital status: Single     Spouse name: Not on file   • Number of children: Not on file   • Years of education: Not on file   • Highest education level: Not on file   Tobacco Use   • Smoking status: Former Smoker     Quit date: 2003     Years since quittin.8   • Smokeless tobacco: Never Used   Substance and Sexual Activity   • Alcohol use: No   • Drug use: No   • Sexual activity: Not Currently         Physical Exam:  /80 (BP Location: Right arm, Patient Position: Lying)   Pulse 90   Temp 98.9 °F (37.2 °C) (Axillary)   Resp 19   Ht 167.6 cm (66\")   Wt 56.6 kg (124 lb 12.5 oz)   SpO2 97%   BMI 20.14 kg/m²     Constitutional:            Vital signs reviewed            Patient is currently on BiPAP    Eyes:            Eyes were closed.             Conjunctiva: Pink    ENT:             Patient was on the BiPAP      Neck:             Supple. ? JVD noted.              Thyroid gland did not seem to be enlarged    Cardiovascular:              S1 + S2. Occasionally irregular    Lungs/Respiratory:            Transmitted breath sounds bilaterally with fair air entry             Percussion could not be performed at this time.            Decreased Air Entry Bilaterally with mostly dry crackles heard.    Abdomen/GI:            Soft.  Bowel sounds sluggishly positive. No obvious organomegaly noted.    Musculoskeletal/Extremities:             Gait could not be assessed at this time.              No clubbing in the upper extremities             No clubbing, cyanosis noted in the upper extremities.             No edema noted in the lower extremities bilaterally.    Neurologic:             Exam was limited since the patient was on BiPAP.    Psychiatric:             Exam was limited since the patient " was on BiPAP.    Skin:             Scattered bruises noted.             Warm and dry.      Labs: Reviewed. Pertinent labs were noted.     Results from last 7 days   Lab Units 04/30/21  0729 04/29/21  0514 04/29/21  0419 04/28/21  0657 04/27/21  0428 04/26/21  1909   WBC 10*3/mm3 11.21*  --  10.41 9.28 8.34 10.37   HEMOGLOBIN g/dL 7.6* 8.0* 6.1* 8.6* 8.8* 9.1*   HEMATOCRIT % 23.4* 24.3* 18.9* 27.4* 27.5* 27.7*   PLATELETS 10*3/mm3 348  --  360 286 268 262   NEUTROPHIL % % 84.4*  --  79.6* 79.4*  --  82.7*   NEUTROS ABS 10*3/mm3 9.46*  --  8.29* 7.36*  --  8.58*   EOSINOPHIL % % 0.6  --  2.5 2.7  --  0.9   EOS ABS 10*3/mm3 0.07  --  0.26 0.25  --  0.09   LYMPHOCYTE % % 8.0*  --  9.9* 9.7*  --  7.8*   LYMPHS ABS 10*3/mm3 0.90  --  1.03 0.90  --  0.81       Results from last 7 days   Lab Units 05/03/21  0523 05/02/21  0607 05/01/21  0556 04/27/21  0429 04/26/21  1909   SODIUM mmol/L 154* 159* 155*  --  142   POTASSIUM mmol/L 3.4* 3.8 4.3  --  3.5   CHLORIDE mmol/L 121* 124* 120*  --  105   CO2 mmol/L 23.0 23.4 23.2  --  24.5   BUN mg/dL 52* 45* 43*  --  45*   CREATININE mg/dL 1.90* 1.52* 1.41*  --  1.40*   CALCIUM mg/dL 9.3 9.7 9.7  --  9.6   EGFR IF NONAFRICN AM mL/min/1.73 26* 33* 36*  --  37*   ANION GAP mmol/L 10.0 11.6 11.8  --  12.5   BILIRUBIN mg/dL  --   --   --   --  0.9   ALK PHOS U/L  --   --   --   --  88   ALT (SGPT) U/L  --   --   --   --  17   AST (SGOT) U/L  --   --   --   --  25   GLUCOSE mg/dL 158* 103* 132*  --  158*   TOTAL PROTEIN g/dL  --   --   --   --  6.1   ALBUMIN g/dL 2.50* 2.60* 2.70*   < > 3.10*    < > = values in this interval not displayed.       Results from last 7 days   Lab Units 05/03/21  0523 05/02/21  0607 05/01/21  0556   MAGNESIUM mg/dL 2.6* 2.6* 2.6*   PHOSPHORUS mg/dL 3.8 4.4 3.6       Lab Results   Component Value Date    PROBNP 20,140.0 (H) 04/30/2021    PROBNP 16,739.0 (H) 04/26/2021    PROBNP 2,562.0 (H) 07/08/2020       Lab Results   Component Value Date    TSH 2.030  04/27/2021    TSH 1.090 12/29/2020    TSH 1.470 02/19/2018       Lab Results   Component Value Date    FREET4 1.35 02/19/2018       Lab Results   Component Value Date    INR 1.57 (H) 07/01/2019    INR 1.33 (H) 06/30/2019    INR 1.12 (H) 06/29/2019       Lab Results   Component Value Date    PROCALCITO 0.42 (H) 05/01/2021    PROCALCITO 0.24 04/26/2021    PROCALCITO 0.72 (H) 01/17/2021       Lab Results   Component Value Date    CRP 20.39 (H) 05/01/2021       No results found for: SEDRATE    ABG: Reviewed  Lab Results   Component Value Date    PHART 7.402 11/12/2020    MOC4XHY 32.8 (L) 11/12/2020    PO2ART 62.7 (L) 11/12/2020    HGBBG 11.8 (L) 06/29/2019    K8VGIFSG 93.5 (L) 11/12/2020    CARBOXYHGB 1.0 11/12/2020         Micro: As of May 3, 2021   No results found for: RESPCX  Lab Results   Component Value Date    BLOODCX No growth at 5 days 04/26/2021    BLOODCX No growth at 5 days 04/26/2021    BLOODCX No growth at 5 days 11/12/2020     Lab Results   Component Value Date    URINECX No growth 03/27/2021     No results found for: MRSACX  Lab Results   Component Value Date    MRSAPCR No MRSA Detected 04/27/2021    MRSAPCR No MRSA Detected 11/13/2020     No results found for: URCX  No components found for: LOWRESPCF  No results found for: THROATCX  No results found for: CULTURES  No components found for: STREPBCX  Lab Results   Component Value Date    STREPPNEUAG Negative 04/26/2021     No results found for: LEGIONELLA  No results found for: MYCOPLASCX  No results found for: GCCX  No results found for: WOUNDCX  No results found for: BODYFLDCX    Lab Results   Component Value Date    FLU Negative 06/29/2019    FLU Negative 06/29/2019       Lab Results   Component Value Date    ADENOVIRUS Not Detected 04/27/2021     Lab Results   Component Value Date    XY159T Not Detected 04/27/2021     Lab Results   Component Value Date    CVHKU1 Not Detected 04/27/2021     Lab Results   Component Value Date    CVNL63 Not Detected  04/27/2021     Lab Results   Component Value Date    CVOC43 Not Detected 04/27/2021     Lab Results   Component Value Date    HUMETPNEVS Not Detected 04/27/2021     Lab Results   Component Value Date    HURVEV Not Detected 04/27/2021     Lab Results   Component Value Date    FLUBPCR Not Detected 04/27/2021     Lab Results   Component Value Date    PARAINFLUE Not Detected 04/27/2021     Lab Results   Component Value Date    PARAFLUV2 Not Detected 04/27/2021     Lab Results   Component Value Date    PARAFLUV3 Not Detected 04/27/2021     Lab Results   Component Value Date    PARAFLUV4 Not Detected 04/27/2021     Lab Results   Component Value Date    BPERTPCR Not Detected 04/27/2021     No results found for: [id number]  Lab Results   Component Value Date    CPNEUPCR Not Detected 04/27/2021     Lab Results   Component Value Date    MPNEUMO Not Detected 04/27/2021     Lab Results   Component Value Date    FLUAPCR Not Detected 04/27/2021     No results found for: FLUAH3  No results found for: FLUAH1  Lab Results   Component Value Date    RSV Not Detected 04/27/2021     Lab Results   Component Value Date    BPARAPCR Not Detected 04/27/2021       COVID 19:  Lab Results   Component Value Date    COVID19 Not Detected 04/26/2021           No results found for: THCURSCR  No results found for: PCPUR  No results found for: COCAINEUR  No results found for: METAMPSCNUR  No results found for: LABOPIASCN  No results found for: AMPHETSCREEN  No results found for: LABBENZSCN  No results found for: TRICYCLICSCN  No results found for: LABMETHSCN  No results found for: BARBITSCNUR  No results found for: OXYCODONESCN  No results found for: PROPOXSCN  No results found for: BUPRENORSCNU  No results found for: ETHANOLMGDL  No results found for: ETOHPCT      dextrose 5 % with KCl 20 mEq, 100 mL/hr  Pharmacy to Dose enoxaparin (LOVENOX),   Pharmacy to Dose meropenem (MERREM),         Imaging Study: Latest imaging studies was reviewed personally.    Imaging Results (Last 72 Hours)     Procedure Component Value Units Date/Time    XR Chest 1 View [[id number]] Collected: 05/01/21 2321     Updated: 05/01/21 2322    Narrative:      FINAL REPORT    CLINICAL HISTORY:  HYPOXIA    FINDINGS:  There are diffuse reticulonodular interstitial opacities.  There  is no pneumothorax or pleural effusion.  The left heart border  is obscured.      Impression:      Diffuse reticulonodular interstitial opacities, could represent  atypical viral pneumonia or chronic interstitial lung disease.    Authenticated by Shabbir Kowalski MD on 05/01/2021 11:21:36 PM    XR Chest 1 View [[id number]] Collected: 04/30/21 1442     Updated: 04/30/21 1449    Narrative:      PROCEDURE: XR CHEST 1 VIEW-     HISTORY: Worsening dyspnea; J18.9-Pneumonia, unspecified organism     COMPARISON:  04/26/2021 and 01/16/2021     FINDINGS:  Portable view of the chest demonstrates diffuse mixed  interstitial and alveolar opacities compatible with pneumonia. Right  lung is similar. More localized consolidation is seen in the lateral  left lung base. There is no evidence of effusion, pneumothorax or other  significant pleural disease. The mediastinum is unremarkable.     The heart size is normal.       Impression:      Worsening pneumonia particularly left lung.      This report was finalized on 4/30/2021 2:47 PM by Butch Liao MD.          ECHO:  Results for orders placed during the hospital encounter of 06/29/19    Adult Transthoracic Echo Complete W/ Cont if Necessary Per Protocol    Interpretation Summary  Technically difficult study  1) Moderate LVH with normal LV systolic function ( EF is over 55%)  2) Severe left atrial enlargement with moderate elevation in LVedp  3) Mild MR  4) Aortic sclerosis with no evidence for stenosis  5) Mild TR with PAsp of 70 mm of hg  6) Normal size and function of the RV  7) Calcified ascending aorta      Results for orders placed during the hospital encounter of 04/07/19    Adult  Transthoracic Echo Complete W/ Cont if Necessary Per Protocol    Interpretation Summary  Technically adequate study  1) Borderline LVH with normal LV systolic function ( EF is over 55%)  2) Moderate to severe left atrial enlargement with mild to moderate elevation in LVedp  3) Thickened mitral leaflets with mild MR  4) Aortic sclerosis without stenosis - mild AI seen  5) Mild TR with PAsp of 50 mm of hg  6) Normal size and function of the RV          Assessment:  1.  Acute Respiratory Failure.  2.  Aspiration Pneumonitis/pulmonary edema?  3.  Hypernatremia  4.  Abnormal CXR  5.  Acute Renal Failure    Discussion/Recommendations:   I have adjusted the BiPAP settings. ABG will be repeated in the morning.      Chest X Ray will be ordered     Nebulized treatments have been started.    The patient did appear to have elevated blood pressure upon arrival in the ER, as reported in H&P.    Although she also could have aspiration pneumonitis, I am not entirely clear on the exact etiology of acute respiratory failure and abnormal chest x-ray.    I will continue antibiotics for now and will clinically monitor the patient and de-escalate as clinically appropriate.    It does appear that the patient is being considered for palliative care.    Given at least 3 recent admissions to the hospital with pneumonias, she may be an appropriate candidate for further discussion regarding goals of care, with the family.    Hypernatremia could be the reason for worsening mental status although she does have underlying dementia.    The plan was discussed with the nursing staff.    Recommendations were also discussed with the referring provider.     I would like to thank you for the opportunity to participate in the care of this patient.  We will communicate changes and recommendations, if and when necessary.      This document was electronically signed by Ariela Izquierdo MD on 05/03/21 at 08:59 EDT      Dictated utilizing Dragon  dictation.

## 2021-05-03 NOTE — PLAN OF CARE
Goal Outcome Evaluation:  Plan of Care Reviewed With: patient  Progress: declining   No acute changes to report. Will continue to monitor. VSS. Transferring to hospice later today.

## 2021-05-03 NOTE — CONSULTS
[facility]    INPATIENT ADVANCED CARE PLANNING CONSULT      Name:  Tammy Pascual   Age:  75 y.o.  Sex:  female  :  1945  MRN:  [id number]   Visit Number:  [id number]  Date of Service:  5/3/2021  Date of Admission:  2021  Primary Care Physician:  Luis Fernando Payan MD      Consulting Physician:    Dr. Jabari Mixon    Reason For Consult:    Addressing goals of care in the setting of progressive decline and end-of-life trajectory    Brief Summary:    I reviewed in detail patient's clinical course on admission and sequence of events since then as reflected by history and physical, progress notes, consults, staff input and work-up.  In summary, Mrs. Pascual is a 75 years old female with history of Alzheimer's dementia, hypertension, recurrent aspiration, atrial fibrillation, chronic kidney disease and diastolic congestive heart failure.  I am familiar with Mrs. Pascual's condition through attending to her years ago as her primary care provider and recently as her provider and skilled care facility.  Mrs. Pascual was hospitalized on  with a diagnosis of acute hypoxic respiratory failure felt to be multifactorial secondary to acute on chronic diastolic heart failure and multifocal aspiration pneumonia.  Since then, patient developed atrial fibrillation with rapid ventricular response addressed with Cardizem drip and Lopressor for rate control; echocardiogram revealed severely increased left atrial volume index.  Patient was managed with IV antibiotics and oxygen supplementation.  Despite the same, she continued to decline; at present is requiring continuous BiPAP.  Palliative care consultation was requested to address goals of care given above presentation.  Advance Care Planning    1.  Determination of decisional capacity:    ·  Patient lacks decisional capacity due to lethargy and inability to communicate her needs.      2.  Advanced Directives:    · Her significant other, Milton  is her POA  · FULL CODE STATUS    3.  Patient & Family Meeting    · Meeting attendees: I contacted Milton by phone     Summary of the discussion:    · After initial presentation presenting role palliative care team, Milton presented patient's progress since her last admission to skilled nursing facility and subsequent transfer to long-term placement.  Stated that she grew increasingly weak with decreased appetite and frailty.  He is aware of her hospital diagnosis, sequence of events since admission, progressive decline and poor prognosis.  · In response to my question regarding patient's previously expressed wishes given her current presentation.  He stated based on patient's previously expressed preferences and personality, he is fairly confident that she would elect deferring further aggressive measures and instead proceeding with comfort measures preferably by hospice services.  He is in agreement to transfer to the [facility] as patient continues to require continuous BiPAP and would require skilled nursing care and continuous provider oversight.  · Concomitantly he expressed his decision to proceed with changing her CODE STATUS to DO NOT RESUSCITATE, DO NOT INTUBATE.  · Above was communicated to case management, nursing staff and primary attending who concurred and will follow with further plans.    Conclusions:    · Code status: DO NOT RESUSCITATE, DO NOT INTUBATE  · Medical interventions: Comfort measures  · Advanced directives: Milton is POA       I appreciate the opportunity to participate in Mrs. Pascual's care.  Please feel free to contact me for any upcoming questions or concerns as need arises.     Total time spent in counseling and advanced care planning discussion per above documentation 45 min.     Dragon system was utilized for this dictation; therefore unintended spelling or expressions may be noted in the body of this document.

## 2021-05-03 NOTE — DISCHARGE SUMMARY
[facility]   DISCHARGE SUMMARY      Name:  Tammy CHRISTENSEN Young   Age:  75 y.o.  Sex:  female  :  1945  MRN:  [id number]   Visit Number:  [id number]  Primary Care Physician:  Luis Fernando Payan MD  Date of Discharge:  5/3/2021  Admission Date:  2021      Discharge Diagnosis:     1.  Acute hypoxic respiratory failure, worsening, suspect due to aspiration.  2.  Acute on chronic diastolic congestive heart failure exacerbation  3.  Multifocal aspiration pneumonitis/pneumonia  4.  Chronic kidney disease stage III appears stable  5.  Anemia, suspect chronic disease  6.  Atrial fibrillation with RVR on Cardizem drip, beta-blocker and on anticoagulation  7.  Alzheimer's dementia with behavioral disturbance  8.  Hypertension  9.  Parkinson's disease  10.  Impaired mobility and ADLs  11.  Chronic wounds-unstageable pressure injuries to bilateral heels, dry eschar.  Present on admission  12.  Electrolyte abnormalities.  13.  Hyponatremia, suspect due to dehydration,   Active Hospital Problems    Diagnosis  POA   • Pneumonia of both lungs due to infectious organism [J18.9]  Yes   • Acute on chronic diastolic CHF (congestive heart failure) (CMS/HCC) [I50.33]  Yes   • Aspiration pneumonia of both lower lobes due to vomit (CMS/HCC) [J69.0]  Yes   • CKD (chronic kidney disease) stage 3, GFR 30-59 ml/min [N18.30]  Yes   • Iron deficiency anemia, unspecified [D50.9]  Yes   • Dementia of the Alzheimer's type with late onset without behavioral disturbance (CMS/HCC) [G30.1, F02.80]  Yes   • Hypertensive urgency [I16.0]  Yes   • Obstructive sleep apnea [G47.33]  Yes   • Hypothyroidism (acquired) [E03.9]  Yes   • Paroxysmal atrial fibrillation (CMS/HCC) [I48.0]  Yes      Resolved Hospital Problems   No resolved problems to display.         Presenting Problem/History of Present Illness:    Pneumonia of both lungs due to infectious organism, unspecified part of lung [J18.9]         Hospital  Course:    Patient is 75-year-old female with Alzheimer's dementia, hypertension, aspiration, A. fib, chronic diastolic congestive heart failure, chronic kidney disease stage III who comes in lethargic and sleepy, not eating or drinking much.  Was found to have low oxygen was sent to the emergency room for evaluation.  Chest x-ray showed bilateral airspace disease.  Patient had been placed on antibiotics Azactam and Zithromax as well as Lasix.  Patient continued to deteriorate. She is now on BiPAP at 15 L of O2.  Chest x-ray  shows diffuse reticulonodular interstitial opacities which could be atypical viral pneumonia or chronic interstitial lung disease.  She has been afebrile.  Procalcitonin is minimally elevated.     Modified barium swallow was done , recommendations are puréed diet with honey thickened liquids, crushed meds, proper head positioning, and aspiration precautions.  Suspicion is the patient is aspirating at this point, and continues to do so.     Patient has worsening dementia as well as Parkinson's disease.  She has had trouble with ambulating.  PT and OT have been consulted to work with the patient, they have signed off today due to worsening oxygen requirements.     Patient is also in A. fib with RVR.  She was on a Cardizem drip, added Lopressor to help with heart rate.  This had improved. Echo did show preserved ejection fraction with severely increased left atrial volume index.  No significant valvular disease noted.       She was switched to Merrem to ensure better coverage.  Respiratory panel, Covid-19, blood cultures, Legionella, strep, MRSA probe had all been negative.       Overall the patient was considered to have extremely poor prognosis given her multiple medical issues, and specifically her advancing dementia.  She continues have difficulty with oral intake with aspiration and decreased fluid intake.  She will improve, then require large amount of oxygen again.  Suspect she has  aspirating.  She is not taking an oral medications or food. Sodium has continued to increase.     Palliative care was familiar with this patient.  They met with the power of  who agreed to make the patient comfort measures and have the patient transferred to the [facility].  Discussed this with Dr. Alanis.    Procedures Performed:           Consults:     Consults     Date and Time Order Name Status Description    5/3/2021  8:20 AM Inpatient Pulmonology Consult      5/3/2021  8:19 AM Inpatient Nephrology Consult Completed           Pertinent Test Results:     Results from last 7 days   Lab Units 05/03/21  0523 05/02/21  0607 05/01/21  0556 04/26/21  1909   SODIUM mmol/L 154* 159* 155* 142   POTASSIUM mmol/L 3.4* 3.8 4.3 3.5   CHLORIDE mmol/L 121* 124* 120* 105   CO2 mmol/L 23.0 23.4 23.2 24.5   BUN mg/dL 52* 45* 43* 45*   CREATININE mg/dL 1.90* 1.52* 1.41* 1.40*   CALCIUM mg/dL 9.3 9.7 9.7 9.6   BILIRUBIN mg/dL  --   --   --  0.9   ALK PHOS U/L  --   --   --  88   ALT (SGPT) U/L  --   --   --  17   AST (SGOT) U/L  --   --   --  25   GLUCOSE mg/dL 158* 103* 132* 158*     Results from last 7 days   Lab Units 04/30/21  0729 04/29/21  0514 04/29/21  0419 04/28/21  0657   WBC 10*3/mm3 11.21*  --  10.41 9.28   HEMOGLOBIN g/dL 7.6* 8.0* 6.1* 8.6*   HEMATOCRIT % 23.4* 24.3* 18.9* 27.4*   PLATELETS 10*3/mm3 348  --  360 286         Results from last 7 days   Lab Units 04/26/21  1909   TROPONIN T ng/mL 0.039*     Results from last 7 days   Lab Units 04/30/21  0729   PROBNP pg/mL 20,140.0*                 Results from last 7 days   Lab Units 04/26/21  1806   COLOR UA  Yellow   GLUCOSE UA  Negative   KETONES UA  Negative   LEUKOCYTES UA  Trace*   PH, URINE  5.5   BILIRUBIN UA  Negative   UROBILINOGEN UA  1.0 E.U./dL     Pain Management Panel    There is no flowsheet data to display.       Results from last 7 days   Lab Units 04/26/21  1907   BLOODCX  No growth at 5 days  No growth at 5 days       Imaging  Results (All)     Procedure Component Value Units Date/Time    XR Chest 1 View [[id number]] Collected: 05/01/21 2321     Updated: 05/01/21 2322    Narrative:      FINAL REPORT    CLINICAL HISTORY:  HYPOXIA    FINDINGS:  There are diffuse reticulonodular interstitial opacities.  There  is no pneumothorax or pleural effusion.  The left heart border  is obscured.      Impression:      Diffuse reticulonodular interstitial opacities, could represent  atypical viral pneumonia or chronic interstitial lung disease.    Authenticated by Shabbir Kowalski MD on 05/01/2021 11:21:36 PM    XR Chest 1 View [[id number]] Collected: 04/30/21 1442     Updated: 04/30/21 1449    Narrative:      PROCEDURE: XR CHEST 1 VIEW-     HISTORY: Worsening dyspnea; J18.9-Pneumonia, unspecified organism     COMPARISON:  04/26/2021 and 01/16/2021     FINDINGS:  Portable view of the chest demonstrates diffuse mixed  interstitial and alveolar opacities compatible with pneumonia. Right  lung is similar. More localized consolidation is seen in the lateral  left lung base. There is no evidence of effusion, pneumothorax or other  significant pleural disease. The mediastinum is unremarkable.     The heart size is normal.       Impression:      Worsening pneumonia particularly left lung.      This report was finalized on 4/30/2021 2:47 PM by Butch Liao MD.    FL Video Swallow With Speech Single Contrast [[id number]] Collected: 04/28/21 1428     Updated: 04/28/21 1438    Narrative:      PROCEDURE: FL VIDEO SWALLOW W SPEECH SINGLE-CONTRAST-     History:  aspiration pneumonia; J18.9-Pneumonia, unspecified organism     FINDINGS:  Fluoroscopy was provided for the speech pathologist to  evaluate the swallowing mechanism. The patient was given several  different consistencies of barium while the swallow was visualized  fluoroscopically. Across all consistencies there was no evidence of  penetration or aspiration. The report of the speech pathologist should  be  consulted prior to making dietary decisions.       Impression:      Modified barium swallow under fluoroscopic guidance.     Multiple episodes of silent aspiration occurred with thinner  consistencies.     Please see speech pathologist's report for further details and dietary  recommendations.        FLUOROSCOPY TIME: 91 s     NUMBER OF CINE SEQUENCES:  7     This report was finalized on 4/28/2021 2:36 PM by Butch Liao MD.    XR Chest 1 View [[id number]] Collected: 04/27/21 0759     Updated: 04/27/21 0818    Narrative:      PROCEDURE: XR CHEST 1 VIEW-     HISTORY: fever; J18.9-Pneumonia, unspecified organism     COMPARISON:  01/16/2021     FINDINGS:  Portable view of the chest demonstrates multifocal airspace  opacities new from prior exam, compatible with pneumonia. Minimal left  pleural effusion is present. The mediastinum is unremarkable.     The heart size is normal.       Impression:      New airspace disease most suspicious for pneumonia.  Recommend continued follow-up.      This report was finalized on 4/27/2021 8:16 AM by Butch Liao MD.          Results for orders placed during the hospital encounter of 04/26/21    Adult Transthoracic Echo Complete W/ Cont if Necessary Per Protocol    Interpretation Summary  1.  Normal left ventricular size and systolic function, LVEF 55-60%.  2.  Grade 2 diastolic dysfunction.  3.  Moderate concentric LVH.  4.  Severely increased left atrial volume index.  5.  Normal right ventricular size and systolic function.  6.  Aortic sclerosis without significant stenosis.  7.  Mild aortic regurgitation.  8.  Trace MR and TR.      Results for orders placed during the hospital encounter of 06/29/19    Adult Transthoracic Echo Complete W/ Cont if Necessary Per Protocol    Interpretation Summary  Technically difficult study  1) Moderate LVH with normal LV systolic function ( EF is over 55%)  2) Severe left atrial enlargement with moderate elevation in LVedp  3) Mild MR  4) Aortic  "sclerosis with no evidence for stenosis  5) Mild TR with PAsp of 70 mm of hg  6) Normal size and function of the RV  7) Calcified ascending aorta      Results for orders placed during the hospital encounter of 04/07/19    Adult Transthoracic Echo Complete W/ Cont if Necessary Per Protocol    Interpretation Summary  Technically adequate study  1) Borderline LVH with normal LV systolic function ( EF is over 55%)  2) Moderate to severe left atrial enlargement with mild to moderate elevation in LVedp  3) Thickened mitral leaflets with mild MR  4) Aortic sclerosis without stenosis - mild AI seen  5) Mild TR with PAsp of 50 mm of hg  6) Normal size and function of the RV      Condition on Discharge:      Poor prognosis    Vital Signs:    /80 (BP Location: Right arm, Patient Position: Lying)   Pulse (!) 131   Temp 98.4 °F (36.9 °C) (Axillary)   Resp 28   Ht 167.6 cm (66\")   Wt 56.6 kg (124 lb 12.5 oz)   SpO2 97%   BMI 20.14 kg/m²     Physical Exam:  General: Alert and oriented x 1, moderate distress  Heart: Regular rate and rhythm without murmur rub or thrill  Lungs: Decreased breath sounds bilaterally without use of accessory muscles or respiration  Abdomen: Soft/nontender/nondistended.  No HSM noted.  MSK: 4/5 strength in upper/lower extremities bilaterally.  No edema noted.    Discharge Disposition:    Hospice/Medical Facility (DC - External)    Discharge Medication:       Discharge Medications      New Medications      Instructions Start Date   ferrous sulfate 300 (60 Fe) MG/5ML syrup   300 mg, Oral, Daily With Breakfast      guaiFENesin 600 MG 12 hr tablet  Commonly known as: MUCINEX   600 mg, Oral, 2 Times Daily      ipratropium 0.02 % nebulizer solution  Commonly known as: ATROVENT   0.5 mg, Nebulization, 4 Times Daily - RT      ipratropium-albuterol 0.5-2.5 mg/3 ml nebulizer  Commonly known as: DUO-NEB   3 mL, Nebulization, Every 6 Hours - RT      PRO-STAT liquid oral liquid   30 mL, Oral, 2 Times " Daily      sennosides-docusate 8.6-50 MG per tablet  Commonly known as: PERICOLACE   1 tablet, Oral, 2 Times Daily PRN         Changes to Medications      Instructions Start Date   QUEtiapine 25 MG tablet  Commonly known as: SEROquel  What changed: how much to take   25 mg, Oral, Nightly         Continue These Medications      Instructions Start Date   Arginaid pack   1 packet, Oral, 2 times daily      LD PROTECT EX   1 application, Apply externally, 2 times daily      gabapentin 300 MG capsule  Commonly known as: NEURONTIN   300 mg, Oral, Every 12 Hours Scheduled      PROSTAT PO   30 mL, Oral, 3 Times Daily         Stop These Medications    acetaminophen 325 MG tablet  Commonly known as: TYLENOL     amLODIPine 10 MG tablet  Commonly known as: NORVASC     apixaban 2.5 MG tablet tablet  Commonly known as: ELIQUIS     atorvastatin 40 MG tablet  Commonly known as: LIPITOR     clopidogrel 75 MG tablet  Commonly known as: PLAVIX     donepezil 10 MG tablet  Commonly known as: ARICEPT     DULoxetine 60 MG capsule  Commonly known as: CYMBALTA     isosorbide mononitrate 30 MG 24 hr tablet  Commonly known as: IMDUR     memantine 10 MG tablet  Commonly known as: NAMENDA     miconazole 2 % cream  Commonly known as: MICOTIN     multivitamin with minerals tablet tablet     potassium chloride 20 MEQ CR tablet  Commonly known as: K-DUR,KLOR-CON            Discharge Diet:     Diet Instructions     Diet: Dysphagia; Honey Thick Liquids; Pureed      Discharge Diet: Dysphagia    Fluid Consistency: Honey Thick Liquids    Pureed Options: Pureed    Diet: Regular      Discharge Diet: Regular          Activity at Discharge:     Activity Instructions     Activity as Tolerated      Activity as Tolerated            Follow-up Appointments:    Future Appointments   Date Time Provider Department Center   6/10/2021  8:50 AM Chavo Bryson MD [facility] [place] [facility]         Test Results Pending at Discharge:      Discharge took 40 minutes  including face time with patient, review of chart, documentation, discussion with palliative care, meeting with nurses, and examining the patient with greater than 50% of time spent in coordination of care.     Jabari Mixon DO  05/03/21  14:06 EDT

## 2021-05-03 NOTE — PROGRESS NOTES
Adult Nutrition  Assessment/PES    Patient Name:  Tammy Pascual  YOB: 1945  MRN: [id number]  Admit Date:  4/26/2021    Assessment Date:  5/3/2021    Comments:    Recommend:  1. Consider adding cardiac and renal modifications to current diet order as medically appropriate and tolerated.  2. Encourage PO and supplement intake. Average PO intake ~14% x 9 meals.   3. Continue Magic Cup TID, Boost Pudding BID, and Prostat BID.   4. Continue multivitamin with iron minerals daily.  5. Continue iron supplementation daily.   6. If PO intake remains poor and family is agreeable, consider nutrition support.     RD to follow pt and available PRN.       Reason for Assessment     Row Name 05/03/21 1301          Reason for Assessment    Reason For Assessment  follow-up protocol     Diagnosis  neurologic conditions;cardiac disease;pulmonary disease;nutrition related history Alzheimer's dementia, HTN, Afib, CKD Stage 3, Heart Failure, Anemia, Chronic wounds on admission, Acute hypoxic respiratory failure     Identified At Risk by Screening Criteria  large or nonhealing wound, burn or pressure injury;difficulty chewing/swallowing;MST SCORE 2+;other (see comments) LACE           Anthropometrics     Row Name 05/03/21 0658          Anthropometrics    Weight  56.6 kg (124 lb 12.5 oz)         Labs/Tests/Procedures/Meds     Row Name 05/03/21 1308          Labs/Procedures/Meds    Lab Results Reviewed  reviewed, pertinent     Lab Results Comments  Low: Alb, K; High: Na, Cl, BUN, Cr, Gluc, Mg, CRP, Procal        Medications    Pertinent Medications Reviewed  reviewed, pertinent     Pertinent Medications Comments  Lipitor, ferrous sulfate, multivitamin w/ iron minerals, Prostat         Physical Findings     Row Name 05/03/21 1309          Physical Findings    Skin  pressure injury Unstageable gluteal, and bilateral heel wounds           Nutrition Prescription Ordered     Row Name 05/03/21 1304          Nutrition Prescription  PO    Current PO Diet  Dysphagia Feeder     Dysphagia Level  2  Pureed     Fluid Consistency  Honey thick     Supplement  Boost Pudding (Ensure Pudding);Magic Cup;ProStat     Supplement Frequency  2 times a day;3 times a day         Evaluation of Received Nutrient/Fluid Intake     Row Name 05/03/21 1309          PO Evaluation    Number of Days PO Intake Evaluated  3 days     Number of Meals  9     % PO Intake  14               Problem/Interventions:  Problem 1     Row Name 05/03/21 1310          Nutrition Diagnoses Problem 1    Problem 1  Increased Nutrient Needs     Macronutrient  Fluid;Protein Arginine     Micronutrient  Vitamin C     Etiology (related to)  Medical Diagnosis     Skin  Pressure injury Multiple unstageable wounds on heels and gluteal area     Signs/Symptoms (evidenced by)  Other (comment) Wounds noted         Problem 2     Row Name 05/03/21 1310          Nutrition Diagnoses Problem 2    Problem 2  Impaired Nutrient Utilization     Etiology (related to)  Medical Diagnosis     Renal  CKD     Signs/Symptoms (evidenced by)  Biochemical     Specific Labs Noted  Glucose;BUN;Creatinine;Chloride;Na+;Mg++         Problem 3     Row Name 05/03/21 1311          Nutrition Diagnoses Problem 3    Problem 3  Needs Alternate Composition     Macronutrient  Kcal;Fluid;Fiber;Protein;Carbohydrate;Fat     Micronutrient  Vitamin;Mineral     Etiology (related to)  Medical Diagnosis     Neurological  Dysphagia     Signs/Symptoms (evidenced by)  SLP/Swallow eval     Swallow eval status  Done     Type of SLP Evaluation  Bedside SLP recommends for MBS consideration         Problem 4     Row Name 05/03/21 1311          Nutrition Diagnoses Problem 4    Problem 4  Inadequate Intake/Infusion     Inadequate Intake Type  Oral     Macronutrient  Fat;Kcal;Fluid;Fiber;Protein;Carbohydrate     Micronutrient  Vitamin;Mineral     Etiology (related to)  Factors Affecting Nutrition     Mental State/Condition  Confusion     Signs/Symptoms  (evidenced by)  Other (comment);PO Intake Pt refusing PO intake     Percent (%) intake recorded  14 %     Over number of meals  9         Intervention Goal     Row Name 05/03/21 1311          Intervention Goal    General  Meet nutritional needs for age/condition;Improved nutrition related lab(s)     PO  Meet estimated needs;Increase intake;PO intake (%)     PO Intake %  50 %     Weight  Maintain weight         Nutrition Intervention     Row Name 05/03/21 1311          Nutrition Intervention    RD/Tech Action  Follow Tx progress;Encourage intake;Recommend/ordered     Recommended/Ordered  Diet;EN         Nutrition Prescription     Row Name 05/03/21 1311          Nutrition Prescription PO    PO Prescription  Begin/change diet     Begin/Change Diet to  Dysphagia;Pureed     Fluid Consistency  Honey thick     Common Modifiers  Cardiac;Renal     New PO Prescription Ordered?  No, recommended        Nutrition Prescription EN    Enteral Prescription  Enteral begin/change If PO remains poor and family is agreeable, consider EN     New EN Prescription Ordered?  No, recommended        Other Orders    Obtain Weight  Daily     Obtain Weight Ordered?  No, recommended     Supplement  Vitamin mineral supplement Continue     Supplement Ordered?  No, recommended         Education/Evaluation     Row Name 05/03/21 1312          Education    Education  Education not appropriate at this time     Please explain  Patient confusion        Monitor/Evaluation    Monitor  Per protocol;I&O;Supplement intake;PO intake;Pertinent labs;Weight;Skin status           Electronically signed by:  Regine Rutledge RD  05/03/21 13:12 EDT

## 2021-05-03 NOTE — CASE MANAGEMENT/SOCIAL WORK
Case Management Discharge Note             CALL REPORT TO: [phone].  FAX DC SUMMARY TO: [phone].    Selected Continued Care - Admitted Since 4/26/2021     Destination Coordination complete    Service Provider Selected Services Address Phone Fax Patient Preferred    [facility]  Inpatient Hospice [address] [address] [phone] --           Transportation Services  Ambulance: [facility]    Final Discharge Disposition Code: 51 - hospice medical facility

## 2021-05-03 NOTE — THERAPY DISCHARGE NOTE
OT signing off pts case d/t decline in medical status and pt unable to actively participate in skilled therapy.

## 2021-05-03 NOTE — PLAN OF CARE
Goal Outcome Evaluation:        Outcome Summary: Pt currently on cardizem drip at 15 for Afib/tacvhycardia.  HR has remained between .  O2 sats have remained stable on Bipap.  PT has refused her PO medications this shift.  Will continue to monitor.

## 2021-05-03 NOTE — CONSULTS
[facility]      Nephrology Consultation      Referring Provider:   No ref. provider found    Reason for Consultation:  Hypernatremia  CKD3    Subjective:  Chief complaint   Chief Complaint   Patient presents with   • Fever     History of present illness:    Patient is 74 yo white female with multiple medical problems as listed below in the past medical history who was admitted 4/26/21 with fever and suspected pneumonia felt to be due to recurrent aspiration. She has been on thickened liquids with aspiration precautions. She has been on Azactam and Zithromax since admission. Her overall appetite has been poor and not eating or drinking much since admission with increased lethargy. She has required BIPAP overnight. She has advancing dementia and all history obtained from hospitalist and from the chart. For full details please see the H+P from the hospitalist which was reviewed by me.  Patient is well known to me as outpatient for CKD3 and h/o anemia requiring JAYNE.  Since admission her renal function is fairly stable, but her serum Na has been persistently elevated. Today it is 154 and unchanged over the last few days.  I was consulted to manage the worsening sodium and CKD related issues. I have reviewed labs/imaging/records from this hospitalization, including ER staff and admitting/attending physicians H/P's and progress notes to establish a comprehensive understanding of this patient's clinical hospital course, as well as to establish plan of care appropriately.   Past Medical History:   Diagnosis Date   • A-fib (CMS/[facility])    • Alzheimer's dementia (CMS/[facility]) 04/07/2019   • Anemia    • Arthritis    • Breast cancer (CMS/HCC)     naveed mastectomy   • Cancer (CMS/HCC)     breast   • Congestive heart failure (CMS/HCC)    • Diabetes mellitus (CMS/HCC)     DIET CONTROLLED   • Disease of thyroid gland    • Hearing loss    • Heart disease    • Hyperlipidemia    • Hypertension    • Impaired functional  mobility, balance, gait, and endurance    • Kidney disease    • Parkinson's disease (CMS/HCC)    • Sleep apnea        Past Surgical History:   Procedure Laterality Date   • BACK SURGERY     • BREAST BIOPSY     • BREAST EXCISIONAL BIOPSY Left 1999   • BREAST RECONSTRUCTION Bilateral    • CAROTID ARTERY ANGIOPLASTY Right     ([facility])   • CATARACT EXTRACTION Bilateral    • COLONOSCOPY N/A 2017    Procedure: COLONOSCOPY;  Surgeon: Sindy Clement MD;  Location: [facility] ENDOSCOPY;  Service:    • HYSTERECTOMY     • MASTECTOMY Bilateral      Family History   Problem Relation Age of Onset   • Cancer Mother         breast   • Diabetes Mother    • Heart disease Father    • Stroke Father    • Hypertension Father    • Breast cancer Maternal Grandmother    • Breast cancer Paternal Grandmother      negative h/o ESRD     Social History     Tobacco Use   • Smoking status: Former Smoker     Quit date: 2003     Years since quittin.8   • Smokeless tobacco: Never Used   Substance Use Topics   • Alcohol use: No   • Drug use: No     Home medications:   Prior to Admission Medications     Prescriptions Last Dose Informant Patient Reported? Taking?    acetaminophen (TYLENOL) 325 MG tablet 2021  Yes Yes    Take 650 mg by mouth Every 6 (Six) Hours As Needed for Mild Pain .    amLODIPine (NORVASC) 10 MG tablet 2021  No Yes    Take 1 tablet by mouth Daily.    apixaban (ELIQUIS) 2.5 MG tablet tablet 2021  No Yes    Take 1 tablet by mouth Every 12 (Twelve) Hours.    atorvastatin (LIPITOR) 40 MG tablet 2021 Self Yes Yes    Take 40 mg by mouth Every Night.    clopidogrel (PLAVIX) 75 MG tablet 2021  No Yes    Take 1 tablet by mouth Daily.    donepezil (ARICEPT) 10 MG tablet 2021  No Yes    TAKE 1 TABLET BY MOUTH  EVERY NIGHT    DULoxetine (CYMBALTA) 60 MG capsule 2021  Yes Yes    Take 60 mg by mouth Daily.    gabapentin (NEURONTIN) 300 MG capsule 2021  No  Yes    Take 1 capsule by mouth Every 12 (Twelve) Hours.    isosorbide mononitrate (IMDUR) 30 MG 24 hr tablet 4/26/2021  No Yes    Take 1 tablet by mouth Daily.    memantine (NAMENDA) 10 MG tablet 4/26/2021 Pharmacy Yes Yes    Take 10 mg by mouth 2 (Two) Times a Day.    miconazole (MICOTIN) 2 % cream 4/19/2021  Yes Yes    Apply 1 application topically to the appropriate area as directed 2 (Two) Times a Day.    multivitamin with minerals tablet tablet 4/26/2021  Yes Yes    Take 1 tablet by mouth Daily.    Nutritional Supplements (Arginaid) pack 4/26/2021  Yes Yes    Take 1 packet by mouth 2 (two) times a day.    Pollen Extracts (PROSTAT PO) 4/26/2021  Yes Yes    Take 30 mL by mouth 3 (Three) Times a Day.    potassium chloride (K-DUR,KLOR-CON) 20 MEQ CR tablet 4/26/2021  Yes Yes    Take 20 mEq by mouth Daily.    QUEtiapine (SEROquel) 25 MG tablet Past Month  No Yes    Take 1 tablet by mouth Every Night.    Patient taking differently:  Take 12.5 mg by mouth Every Night.    Skin Protectants, Misc. (LD PROTECT EX) 4/26/2021  Yes Yes    Apply 1 application topically 2 (two) times a day.        Emergency department medications:   Medications   sodium chloride 0.9 % flush 10 mL (has no administration in time range)   donepezil (ARICEPT) tablet 10 mg (10 mg Oral Not Given 5/2/21 2050)   atorvastatin (LIPITOR) tablet 40 mg (40 mg Oral Not Given 5/2/21 2049)   clopidogrel (PLAVIX) tablet 75 mg (75 mg Oral Given 5/2/21 1027)   gabapentin (NEURONTIN) capsule 300 mg (300 mg Oral Not Given 5/2/21 2050)   memantine (NAMENDA) tablet 10 mg (10 mg Oral Not Given 5/2/21 2050)   sodium chloride 0.9 % flush 10 mL (10 mL Intravenous Not Given 5/2/21 2051)   sodium chloride 0.9 % flush 10 mL (has no administration in time range)   acetaminophen (TYLENOL) tablet 650 mg ( Oral Not Given:  See Alt 5/1/21 0135)     Or   acetaminophen (TYLENOL) 160 MG/5ML solution 650 mg ( Oral Not Given:  See Alt 5/1/21 3655)     Or   acetaminophen (TYLENOL)  suppository 650 mg (650 mg Rectal Given 5/1/21 0135)   ondansetron (ZOFRAN) tablet 4 mg (has no administration in time range)     Or   ondansetron (ZOFRAN) injection 4 mg (has no administration in time range)   metoprolol tartrate (LOPRESSOR) injection 2.5 mg (has no administration in time range)   potassium chloride (MICRO-K) CR capsule 40 mEq (has no administration in time range)     Or   potassium chloride (KLOR-CON) packet 40 mEq (has no administration in time range)     Or   potassium chloride 10 mEq in 100 mL IVPB (has no administration in time range)   Magnesium Sulfate 2 gram Bolus, followed by 8 gram infusion (total Mg dose 10 grams)- Mg less than or equal to 1mg/dL (has no administration in time range)     Or   Magnesium Sulfate 2 gram / 50mL Infusion (GIVE X 3 BAGS TO EQUAL 6GM TOTAL DOSE) - Mg 1.1 - 1.5 mg/dl (has no administration in time range)     Or   Magnesium Sulfate 4 gram infusion- Mg 1.6-1.9 mg/dL (has no administration in time range)   potassium & sodium phosphates (PHOS-NAK) 280-160-250 MG packet - for Phosphorus less than 1.25 mg/dL (2 packets Oral Not Given 4/29/21 0746)     Or   potassium & sodium phosphates (PHOS-NAK) 280-160-250 MG packet - for Phosphorus 1.25 - 2.5 mg/dL ( Oral Not Given:  See Alt 4/29/21 0746)   calcium gluconate 1 g in sodium chloride 0.9 % 100 mL IVPB (has no administration in time range)     And   calcium gluconate 6 g in sodium chloride 0.9 % 500 mL IVPB (has no administration in time range)   PRO-STAT oral liquid 30 mL (30 mL Oral Not Given 5/2/21 2050)   ferrous sulfate 300 (60 Fe) MG/5ML syrup 300 mg (300 mg Oral Not Given 5/2/21 0800)   isosorbide dinitrate (ISORDIL) tablet 10 mg (10 mg Oral Given 5/2/21 1802)   ipratropium-albuterol (DUO-NEB) nebulizer solution 3 mL (3 mL Nebulization Given 5/3/21 0700)   sennosides-docusate (PERICOLACE) 8.6-50 MG per tablet 1 tablet (has no administration in time range)   bisacodyl (DULCOLAX) suppository 10 mg (has no  administration in time range)   DULoxetine (CYMBALTA) DR capsule 60 mg (60 mg Oral Given 5/2/21 1027)   guaiFENesin (ROBITUSSIN) 100 MG/5ML oral solution 200 mg (200 mg Oral Not Given 5/3/21 0356)   multivitamin with iron-minerals liquid 15 mL (15 mL Oral Not Given 5/2/21 0900)   Pharmacy to Dose meropenem (MERREM) (has no administration in time range)   Pharmacy to Dose enoxaparin (LOVENOX) (has no administration in time range)   meropenem in sodium chloride 0.9% 50 mL (MERREM) IVPB 1 g (1 g Intravenous Given 5/3/21 0518)   enoxaparin (LOVENOX) syringe 60 mg (60 mg Subcutaneous Given 5/2/21 1802)   metoprolol tartrate (LOPRESSOR) tablet 25 mg (25 mg Oral Not Given 5/2/21 2050)   potassium chloride (KLOR-CON) packet 40 mEq (has no administration in time range)   dextrose 5 % with KCl 20 mEq/L infusion (has no administration in time range)   dilTIAZem (CARDIZEM) tablet 30 mg (has no administration in time range)   aztreonam (AZACTAM) 2 g in sodium chloride 0.9 % 100 mL IVPB (2 g Intravenous New Bag 4/26/21 2012)   furosemide (LASIX) injection 80 mg (80 mg Intravenous Given 4/26/21 2012)   nitroglycerin (NITROSTAT) ointment 1 inch (1 inch Topical Given 4/26/21 2012)   potassium chloride 10 mEq in 100 mL IVPB (10 mEq Intravenous New Bag 4/27/21 1742)   potassium chloride 10 mEq in 100 mL IVPB (10 mEq Intravenous New Bag 4/28/21 0318)   Magnesium Sulfate 4 gram infusion- Mg 1.6-1.9 mg/dL (4 g Intravenous New Bag 4/28/21 0818)   potassium chloride 10 mEq in 100 mL IVPB (10 mEq Intravenous New Bag 4/29/21 1151)   potassium chloride (MICRO-K) CR capsule 40 mEq (40 mEq Oral Given 4/30/21 1724)   furosemide (LASIX) injection 40 mg (40 mg Intravenous Given 4/30/21 1724)   dilTIAZem (CARDIZEM) injection 10 mg (10 mg Intravenous Given 5/1/21 1621)   metoprolol tartrate (LOPRESSOR) injection 5 mg (5 mg Intravenous Given 5/1/21 1523)   meropenem in sodium chloride 0.9% 50 mL (MERREM) IVPB 1 g (1 g Intravenous Given 5/1/21 9743)  "  metoprolol tartrate (LOPRESSOR) injection 5 mg (5 mg Intravenous Given 5/1/21 2220)       Allergies:  Cephalosporins, Quinolones, Ciprofloxacin, and Keflex [cephalexin]    Review of Systems    Patient is unresponsive and no meaningful review of system is possible.    Objective:  Vital Signs  /80 (BP Location: Right arm, Patient Position: Lying)   Pulse 90   Temp 98.9 °F (37.2 °C) (Axillary)   Resp 19   Ht 167.6 cm (66\")   Wt 56.6 kg (124 lb 12.5 oz)   SpO2 97%   BMI 20.14 kg/m²          No intake/output data recorded.    Intake/Output Summary (Last 24 hours) at 5/3/2021 0844  Last data filed at 5/3/2021 0518  Gross per 24 hour   Intake 1670 ml   Output --   Net 1670 ml       Physical Exam:  General Appearance:   In no acute distress.     Head:   Normocephalic, without obvious abnormality, atraumatic.     Eyes:      Normal, conjunctivae and sclerae, no icterus, no pallor, corneas clear, PERRLA        Throat:   Oral mucosa dry      Neck:  No adenopathy, supple, trachea midline, no thyromegaly, no carotid bruit, no JVD        Lungs:    Clear to auscultation and fair air movement noted.      Heart::   Regular rhythm and normal rate, normal S1 and S2.       Abdomen:   Obese. Normal bowel sounds, no masses, no organomegaly, soft non-tender, non-distended, no guarding, no rebound tenderness      Genital urinary:   No urinary bladder palpable      Extremities:  No edema, no cyanosis, no redness.     Pulses:  Pulses palpable and equal bilaterally but weak.     Skin:  No bleeding, occasional bruising and no rash        Neurologic:   No meaningful interaction.         Results Review:   Results from last 7 days   Lab Units 05/03/21  0523 05/02/21  0607 05/01/21  0556 04/27/21  0429 04/26/21  1909   SODIUM mmol/L 154* 159* 155*  --  142   POTASSIUM mmol/L 3.4* 3.8 4.3  --  3.5   CHLORIDE mmol/L 121* 124* 120*  --  105   CO2 mmol/L 23.0 23.4 23.2  --  24.5   BUN mg/dL 52* 45* 43*  --  45*   CREATININE mg/dL 1.90* " 1.52* 1.41*  --  1.40*   CALCIUM mg/dL 9.3 9.7 9.7  --  9.6   ALBUMIN g/dL 2.50* 2.60* 2.70*   < > 3.10*   BILIRUBIN mg/dL  --   --   --   --  0.9   ALK PHOS U/L  --   --   --   --  88   ALT (SGPT) U/L  --   --   --   --  17   AST (SGOT) U/L  --   --   --   --  25   GLUCOSE mg/dL 158* 103* 132*  --  158*    < > = values in this interval not displayed.     Estimated Creatinine Clearance: 22.5 mL/min (A) (by C-G formula based on SCr of 1.9 mg/dL (H)).  Results from last 7 days   Lab Units 05/03/21  0523 05/02/21  0607 05/01/21  0556   MAGNESIUM mg/dL 2.6* 2.6* 2.6*   PHOSPHORUS mg/dL 3.8 4.4 3.6         Results from last 7 days   Lab Units 04/30/21  0729 04/29/21  0514 04/29/21  0419 04/28/21  0657 04/27/21  0428 04/26/21  1909   WBC 10*3/mm3 11.21*  --  10.41 9.28 8.34 10.37   HEMOGLOBIN g/dL 7.6* 8.0* 6.1* 8.6* 8.8* 9.1*   PLATELETS 10*3/mm3 348  --  360 286 268 262         Brief Urine Lab Results  (Last result in the past 365 days)      Color   Clarity   Blood   Leuk Est   Nitrite   Protein   CREAT   Urine HCG        04/26/21 1806 Yellow Cloudy Large (3+) Trace Negative 100 mg/dL (2+)             No results found for: UTPCR  Imaging Results (Last 24 Hours)     ** No results found for the last 24 hours. **        atorvastatin, 40 mg, Oral, Nightly  clopidogrel, 75 mg, Oral, Daily  dilTIAZem, 30 mg, Oral, Q6H  donepezil, 10 mg, Oral, Nightly  DULoxetine, 60 mg, Oral, Daily  enoxaparin, 60 mg, Subcutaneous, Q24H  ferrous sulfate, 300 mg, Oral, Daily With Breakfast  gabapentin, 300 mg, Oral, Q12H  guaiFENesin, 200 mg, Oral, Q4H  ipratropium-albuterol, 3 mL, Nebulization, Q6H - RT  isosorbide dinitrate, 10 mg, Oral, TID - Nitrates  memantine, 10 mg, Oral, Q12H  meropenem, 1 g, Intravenous, Q12H  metoprolol tartrate, 25 mg, Oral, Q12H  multivitamin with iron-minerals, 15 mL, Oral, Daily  potassium chloride, 40 mEq, Oral, Q4H  PRO-STAT, 30 mL, Oral, BID  sodium chloride, 10 mL, Intravenous, Q12H      dextrose 5 % with KCl  20 mEq, 100 mL/hr  Pharmacy to Dose enoxaparin (LOVENOX),   Pharmacy to Dose meropenem (MERREM),         Assessment/Plan:    1.   Hypernatremia  2.   CKD (chronic kidney disease) stage 3, GFR 30-59 ml/min  3.   Iron deficiency anemia, unspecified  4.   Pneumonia of both lungs due to infectious organism  5.   Acute on chronic diastolic CHF (congestive heart failure) (CMS/HCC)  6.   Aspiration pneumonia of both lower lobes due to vomit (CMS/HCC)  7.   Paroxysmal atrial fibrillation (CMS/HCC)  8.   Obstructive sleep apnea  9.   Hypertensive urgency  10.   Hypothyroidism (acquired)  11.   Dementia of the Alzheimer's type with late onset without behavioral disturbance (CMS/[facility])      Risk and complexity: High      Plan:  · Continue with rest of the current treatment plan and surveillance labs.  · Details were also discussed with the hospitalist service.  Patient has fairly poor prognosis, palliative care and hospice consultation is in progress.  · Further recommendations will depend on clinical course of the patient during the current hospitalization.    · I also discussed the details with the nursing staff.  · Rest as ordered.    In closing, I sincerely appreciate opportunity to participate in care of this patient. If I can be of any further assistance with the management of this patient, please don’t hesitate to contact me.    Rajesh Escalante MD, FASN    05/03/21  08:47 EDT    Dictated using Dragon.

## 2021-05-03 NOTE — PROGRESS NOTES
Dr. Alanis spoke with pt's POA (Milton) earlier this date.  Code status was discussed and Milton verbalized that he would like pt to be DNR/DNI.  Dr. Mixon updated.    Dr. Alanis and I visited patient in her room (320).  Pt is sleeping and on continuous bipap.  Pt did not respond when her name was called.  Pt appeared to be very comfortable.  However, pt does meet criteria for CCC due to continuous bipap and low BP.  Dr. Alanis will discuss with POA.    Dr. Alanis spoke to POA via phone call.  Update re pt's condition discussed.  He is in agreement for pt to be transferred to the [facility].  Dr. Mixon updated.    Called HCP; spoke to [facility].  Referral for transfer to [facility] given.  She will notify [facility].  Lenora at [facility] will get needed info from Epic.  SANDRA Novak updated re DCP and that covid test needed.    Report number is [phone].  Discharge summary will need to be faxed after neg Covid verified to [phone].

## 2021-06-11 NOTE — PROGRESS NOTES
Nursing Home Televisit Progress Note      Tano Cevallos DO []  CUONG Bauer []  [address] [place] [address]  Phone: [phone]  Fax: [phone] Freida Alanis MD[x]  Woodrow Quiroz DO []   KEAGAN Bartholomew []    [address] [place] [address]  Phone: [phone]  Fax: [phone]     PATIENT NAME: Tammy Pascual                                                                          YOB: 1945           DATE OF SERVICE: 11/18/2020  FACILITY:   [x] [place] [] [place]  [] [place]    [] [place]      ______________________________________________________________________    Consent obtained to perform televisit via FaceTime    CHIEF COMPLAINT:  COVID-19 infection      HISTORY OF PRESENT ILLNESS:   Mrs. Pascual is a 74 years old female, previously resident of [facility] living [facility], with history of Alzheimer's dementia, chronic kidney disease, diastolic heart failure, hypothyroidism, atrial fibrillation and coronary artery disease who was hospitalized at [facility] November 12-17 with a diagnosis of left lower lobe pneumonia and acute renal failure secondary acute metabolic encephalopathy.  Subsequently, she was noted to have uncontrolled hypertension, she was managed with IV antibiotics, hydration, amlodipine and ACE inhibitor which was initially held and then resumed along with supportive care after which her condition stabilized.  Patient was transferred to this facility for PT, OT and skilled nursing care.    Since admission, patient remained weak but engaged in PT and OT and remained in stable condition.  Unfortunately, she was diagnosed with COVID-19 infection on December 18 and was transferred to Covid isolation unit.  During my televisit, patient.  Comfortable and was pleasantly confused with no signs of distress or discomfort.    PAST MEDICAL & SURGICAL HISTORY:   Past Medical History:   Diagnosis Date   •  A-fib (CMS/[facility])    • Alzheimer's dementia (CMS/HCC) 04/07/2019   • Anemia    • Arthritis    • Breast cancer (CMS/[facility])     naveed mastectomy   • Cancer (CMS/HCC)     breast   • Congestive heart failure (CMS/HCC)    • Diabetes mellitus (CMS/HCC)     DIET CONTROLLED   • Disease of thyroid gland    • Hearing loss    • Heart disease    • Hyperlipidemia    • Hypertension    • Impaired functional mobility, balance, gait, and endurance    • Kidney disease    • Parkinson's disease (CMS/[facility])    • Sleep apnea       Past Surgical History:   Procedure Laterality Date   • BACK SURGERY  2015   • BREAST BIOPSY  1998   • BREAST EXCISIONAL BIOPSY Left 02/08/1999   • BREAST RECONSTRUCTION Bilateral    • CAROTID ARTERY ANGIOPLASTY Right 2008    ([facility])   • CATARACT EXTRACTION Bilateral    • COLONOSCOPY N/A 8/17/2017    Procedure: COLONOSCOPY;  Surgeon: Sindy Clement MD;  Location: [facility] ENDOSCOPY;  Service:    • HYSTERECTOMY     • MASTECTOMY Bilateral 2010       MEDICATIONS:  I have reviewed and reconciled the patients medication list in the patients chart at the skilled nursing facility today.      ALLERGIES:  Allergies   Allergen Reactions   • Cephalosporins Anaphylaxis   • Quinolones Anaphylaxis   • Ciprofloxacin    • Keflex [Cephalexin] Hives      REVIEW OF SYSTEMS:  Review of Systems   Constitutional: Positive for fatigue.   HENT: Negative.    Respiratory: Negative.    Cardiovascular: Negative.    Gastrointestinal: Negative.    Genitourinary: Negative.    Musculoskeletal: Positive for arthralgias, gait problem and myalgias.   Skin: Negative.    Neurological: Positive for confusion.   Psychiatric/Behavioral: The patient is nervous/anxious.        PHYSICAL EXAMINATION: [Limited due to televisit]  /2, HR 68/min, RR 20/min, temp 98.2 °F    Physical Exam  Constitutional:       Appearance: Normal appearance.   Musculoskeletal:         General: Normal range of motion.      Comments: Deconditioning noted    Neurological:      Mental Status: She is alert.      Comments: Increased confusion noted   Psychiatric:         Mood and Affect: Mood normal.       RECORDS REVIEW:   I have reviewed in detail available records including discharge summary and workup    ASSESSMENT:  · COVID-19 infection   · status post left lower lobe pneumonia, improved  · Chronic essential hypertension, borderline control  · Coronary artery disease, stable  · Chronic atrial fibrillation, clinically rate controlled at present  · Chronic kidney disease  · Alzheimer's dementia    PLAN:  · COVID-19 isolation and supportive care   · preadmission medications reviewed, reconciled and reviewed  · PT and OT as clinically indicated  · Fall and aspiration precautions   · Routine baseline labs  · Nutritional support recommended and monitored with interdisciplinary support  · Patient is being monitored closely, further plans were modified accordingly    [x]  Discussed Patient in detail with nursing/staff, addressed all needs today.     [x]  Plan of Care Reviewed   [x]  PT/OT Reviewed     []  Wound assessment and management documentation reviewed    []  Antipsychotic medications and benzodiazepine addressed  []  Order Changes  []  Opioid medications addressed  []  Order Changes  []  Discharge Plans Reviewed   []  Advance Directive on file with Nursing Home.   []  POA on file with Nursing Home.   [x]  Code Status listed on patients chart at the nursing home facility.        Duration spent dental visit 15 minutes    Freida Alanis MD.  12/23/20

## 2021-07-05 NOTE — PROGRESS NOTES
Nursing Home History and Physical        T[facility] DO Mart []  CUONG Bauer []  [address] [place] [address]  Phone: [phone]  Fax: [phone] Freida Alanis MD[x]  Woodrow Quiroz DO []   KEAGAN Bartholomew []    [address] [place] [address]  Phone: [phone]  Fax: [phone]     PATIENT NAME: Tammy Pascual                                                                          YOB: 1945           DATE OF SERVICE: 1/20/2021  FACILITY:   [x] [facility] [] [facility]  [] [facility]    [] [facility]      ______________________________________________________________________    CHIEF COMPLAINT:  First visit after readmission, hospitalized for pneumonia      HISTORY OF PRESENT ILLNESS:   Mrs. Pascual is a 74 years old female, with history of Alzheimer's dementia, chronic kidney disease, diastolic heart failure, hypothyroidism, atrial fibrillation and coronary artery disease who was a resident at this facility who was hospitalized at [facility] January 16, 2021 with the diagnosis of bilateral pneumonia secondary to COVID-19 infection and secondary acute metabolic encephalopathy. She was managed with Decadron, doxycycline and hydration with gradual improvement; as reported by hospital staff, she had an episode of confusion which was addressed with p.m. Seroquel. She remained hemodynamically stable and her sats above 92% with administration of supplemental oxygen and recommendation for CPAP which she frequently refused. She was transferred back to this facility for PT, OT and skilled nursing care.    During my visit, patient was lying comfortably in her bed; she appeared frail and weak. She responded to me calling her name and introducing myself. She however was unable to engage in a conversation.    PAST MEDICAL & SURGICAL HISTORY:   Past Medical History:   Diagnosis Date   • A-fib (CMS/[facility])    • Alzheimer's dementia (CMS/[facility]ly [facility]) 04/07/2019   •  Anemia    • Arthritis    • Breast cancer (CMS/HCC)     naveed mastectomy   • Cancer (CMS/HCC)     breast   • Congestive heart failure (CMS/HCC)    • Diabetes mellitus (CMS/HCC)     DIET CONTROLLED   • Disease of thyroid gland    • Hearing loss    • Heart disease    • Hyperlipidemia    • Hypertension    • Impaired functional mobility, balance, gait, and endurance    • Kidney disease    • Parkinson's disease (CMS/HCC)    • Sleep apnea       Past Surgical History:   Procedure Laterality Date   • BACK SURGERY     • BREAST BIOPSY     • BREAST EXCISIONAL BIOPSY Left 1999   • BREAST RECONSTRUCTION Bilateral    • CAROTID ARTERY ANGIOPLASTY Right     ([facility])   • CATARACT EXTRACTION Bilateral    • COLONOSCOPY N/A 2017    Procedure: COLONOSCOPY;  Surgeon: Sindy Clement MD;  Location: [facility] ENDOSCOPY;  Service:    • HYSTERECTOMY     • MASTECTOMY Bilateral          MEDICATIONS:  I have reviewed and reconciled the patients medication list in the patients chart at the skilled nursing facility today.      ALLERGIES:  Allergies   Allergen Reactions   • Cephalosporins Anaphylaxis   • Quinolones Anaphylaxis   • Ciprofloxacin    • Keflex [Cephalexin] Hives         SOCIAL HISTORY:  Social History     Socioeconomic History   • Marital status: Single     Spouse name: Not on file   • Number of children: Not on file   • Years of education: Not on file   • Highest education level: Not on file   Tobacco Use   • Smoking status: Former Smoker     Quit date: 2003     Years since quittin.0   • Smokeless tobacco: Never Used   Substance and Sexual Activity   • Alcohol use: No   • Drug use: No   • Sexual activity: Not Currently       FAMILY HISTORY:  Family History   Problem Relation Age of Onset   • Cancer Mother         breast   • Diabetes Mother    • Heart disease Father    • Stroke Father    • Hypertension Father    • Breast cancer Maternal Grandmother    • Breast cancer Paternal  Grandmother        REVIEW OF SYSTEMS:  Review of Systems   Constitutional: Positive for fatigue.   HENT: Negative.    Respiratory: Negative.    Cardiovascular: Negative.    Gastrointestinal: Negative.    Genitourinary: Negative.    Musculoskeletal: Positive for arthralgias, gait problem and myalgias.   Skin: Negative.    Neurological: Positive for confusion.   Psychiatric/Behavioral: The patient is nervous/anxious.        PHYSICAL EXAMINATION:   /64, HR 72/min, RR 18/min, temp 98.6 °F    Physical Exam  Constitutional:       Appearance: Normal appearance.   HENT:      Head: Normocephalic and atraumatic.   Eyes:      Extraocular Movements: Extraocular movements intact.      Pupils: Pupils are equal, round, and reactive to light.   Cardiovascular:      Rate and Rhythm: Normal rate and regular rhythm.      Pulses: Normal pulses.      Heart sounds: Normal heart sounds.   Pulmonary:      Effort: Pulmonary effort is normal.      Breath sounds: Normal breath sounds.   Abdominal:      General: Abdomen is flat.      Palpations: Abdomen is soft.   Musculoskeletal:         General: Normal range of motion.      Cervical back: Normal range of motion and neck supple.      Comments: Deconditioning noted   Skin:     General: Skin is warm.   Neurological:      Mental Status: She is alert.      Comments: Pleasantly confused   Psychiatric:         Mood and Affect: Mood normal.       RECORDS REVIEW:   I have reviewed in detail available records including discharge summary and workup    ASSESSMENT:  · Status post bilateral COVID-19 pneumonia  · Coronary artery disease, stable  · Chronic atrial fibrillation, on apixaban; clinically rate controlled at present  · Chronic kidney disease  · Chronic essential hypertension  · Chronic diastolic heart failure with no exacerbation at present  · Alzheimer's dementia    PLAN:  · Preadmission medications reviewed, reconciled and reviewed  · PT and OT as clinically indicated  · Supplemental  oxygen and BiPAP as recommended  · Fall and aspiration precautions   · Routine baseline labs  · Nutritional support recommended and monitored with interdisciplinary support  · Patient is being monitored closely, further plans were modified accordingly  · We will communicate with POA to discuss further goals of care and advance care planning.    [x]  Discussed Patient in detail with nursing/staff, addressed all needs today.     [x]  Plan of Care Reviewed   [x]  PT/OT Reviewed     []  Wound assessment and management documentation reviewed    []  Antipsychotic medications and benzodiazepine addressed  []  Order Changes  []  Opioid medications addressed  []  Order Changes  []  Discharge Plans Reviewed   []  Advance Directive on file with Nursing Home.   []  POA on file with Nursing Home.   [x]  Code Status listed on patients chart at the nursing home facility.          Freida Alanis MD.  1/20/2021

## (undated) DEVICE — YANKAUER,BULB TIP, NO VENT: Brand: ARGYLE

## (undated) DEVICE — GLV SURG SENSICARE W/ALOE PF LF 6.5 STRL

## (undated) DEVICE — MEDI-VAC NON-CONDUCTIVE SUCTION TUBING: Brand: CARDINAL HEALTH

## (undated) DEVICE — GLV SURG SENSICARE W/ALOE PF LF SZ6 STRL

## (undated) DEVICE — JELLY,LUBE,STERILE,FLIP TOP,TUBE,2-OZ: Brand: MEDLINE

## (undated) DEVICE — Device